# Patient Record
Sex: FEMALE | Race: WHITE | NOT HISPANIC OR LATINO | Employment: OTHER | URBAN - METROPOLITAN AREA
[De-identification: names, ages, dates, MRNs, and addresses within clinical notes are randomized per-mention and may not be internally consistent; named-entity substitution may affect disease eponyms.]

---

## 2024-07-22 ENCOUNTER — OUTSIDE PLACE OF SERVICE (OUTPATIENT)
Dept: HEPATOLOGY | Facility: CLINIC | Age: 84
End: 2024-07-22
Payer: MEDICARE

## 2024-07-22 ENCOUNTER — OUTSIDE PLACE OF SERVICE (OUTPATIENT)
Facility: HOSPITAL | Age: 84
End: 2024-07-22

## 2024-08-06 ENCOUNTER — HOSPITAL ENCOUNTER (INPATIENT)
Facility: HOSPITAL | Age: 84
LOS: 14 days | Discharge: NURSING, REHAB OR LONG-TERM CARE | DRG: 100 | End: 2024-08-20
Attending: INTERNAL MEDICINE | Admitting: INTERNAL MEDICINE
Payer: MEDICARE

## 2024-08-06 DIAGNOSIS — G93.41 ENCEPHALOPATHY, METABOLIC: Primary | ICD-10-CM

## 2024-08-06 DIAGNOSIS — R56.9 SEIZURE: ICD-10-CM

## 2024-08-06 DIAGNOSIS — S70.02XD HEMATOMA OF LEFT HIP, SUBSEQUENT ENCOUNTER: ICD-10-CM

## 2024-08-06 DIAGNOSIS — R56.9 SEIZURE-LIKE ACTIVITY: ICD-10-CM

## 2024-08-06 DIAGNOSIS — R07.9 CHEST PAIN: ICD-10-CM

## 2024-08-06 PROCEDURE — 63600175 PHARM REV CODE 636 W HCPCS: Performed by: HOSPITALIST

## 2024-08-06 PROCEDURE — 11000001 HC ACUTE MED/SURG PRIVATE ROOM

## 2024-08-06 PROCEDURE — 25000003 PHARM REV CODE 250: Performed by: HOSPITALIST

## 2024-08-06 RX ORDER — GLUCAGON 1 MG
1 KIT INJECTION
Status: DISCONTINUED | OUTPATIENT
Start: 2024-08-06 | End: 2024-08-20 | Stop reason: HOSPADM

## 2024-08-06 RX ORDER — LEVETIRACETAM 500 MG/5ML
750 INJECTION, SOLUTION, CONCENTRATE INTRAVENOUS EVERY 12 HOURS
Status: DISCONTINUED | OUTPATIENT
Start: 2024-08-06 | End: 2024-08-07

## 2024-08-06 RX ORDER — MUPIROCIN 20 MG/G
OINTMENT TOPICAL 2 TIMES DAILY
Status: DISPENSED | OUTPATIENT
Start: 2024-08-06 | End: 2024-08-10

## 2024-08-06 RX ORDER — TALC
6 POWDER (GRAM) TOPICAL NIGHTLY PRN
Status: DISCONTINUED | OUTPATIENT
Start: 2024-08-06 | End: 2024-08-20 | Stop reason: HOSPADM

## 2024-08-06 RX ORDER — ACETAMINOPHEN 325 MG/1
650 TABLET ORAL EVERY 4 HOURS PRN
Status: DISCONTINUED | OUTPATIENT
Start: 2024-08-06 | End: 2024-08-20 | Stop reason: HOSPADM

## 2024-08-06 RX ORDER — NALOXONE HCL 0.4 MG/ML
0.02 VIAL (ML) INJECTION
Status: DISCONTINUED | OUTPATIENT
Start: 2024-08-06 | End: 2024-08-20 | Stop reason: HOSPADM

## 2024-08-06 RX ORDER — HYDRALAZINE HYDROCHLORIDE 20 MG/ML
5 INJECTION INTRAMUSCULAR; INTRAVENOUS EVERY 6 HOURS PRN
Status: DISCONTINUED | OUTPATIENT
Start: 2024-08-06 | End: 2024-08-17

## 2024-08-06 RX ORDER — LEVOFLOXACIN 5 MG/ML
750 INJECTION, SOLUTION INTRAVENOUS
Status: DISCONTINUED | OUTPATIENT
Start: 2024-08-08 | End: 2024-08-07

## 2024-08-06 RX ORDER — DEXTROSE MONOHYDRATE AND SODIUM CHLORIDE 5; .9 G/100ML; G/100ML
INJECTION, SOLUTION INTRAVENOUS CONTINUOUS
Status: DISCONTINUED | OUTPATIENT
Start: 2024-08-06 | End: 2024-08-07

## 2024-08-06 RX ORDER — MORPHINE SULFATE 2 MG/ML
2 INJECTION, SOLUTION INTRAMUSCULAR; INTRAVENOUS EVERY 4 HOURS PRN
Status: DISCONTINUED | OUTPATIENT
Start: 2024-08-06 | End: 2024-08-07

## 2024-08-06 RX ORDER — ALUMINUM HYDROXIDE, MAGNESIUM HYDROXIDE, AND SIMETHICONE 1200; 120; 1200 MG/30ML; MG/30ML; MG/30ML
30 SUSPENSION ORAL 4 TIMES DAILY PRN
Status: DISCONTINUED | OUTPATIENT
Start: 2024-08-06 | End: 2024-08-20 | Stop reason: HOSPADM

## 2024-08-06 RX ORDER — CALCIUM CARBONATE 200(500)MG
500 TABLET,CHEWABLE ORAL 2 TIMES DAILY PRN
Status: DISCONTINUED | OUTPATIENT
Start: 2024-08-06 | End: 2024-08-20 | Stop reason: HOSPADM

## 2024-08-06 RX ORDER — ONDANSETRON HYDROCHLORIDE 2 MG/ML
4 INJECTION, SOLUTION INTRAVENOUS EVERY 8 HOURS PRN
Status: DISCONTINUED | OUTPATIENT
Start: 2024-08-06 | End: 2024-08-20 | Stop reason: HOSPADM

## 2024-08-06 RX ORDER — PROCHLORPERAZINE EDISYLATE 5 MG/ML
5 INJECTION INTRAMUSCULAR; INTRAVENOUS EVERY 6 HOURS PRN
Status: DISCONTINUED | OUTPATIENT
Start: 2024-08-06 | End: 2024-08-20 | Stop reason: HOSPADM

## 2024-08-06 RX ORDER — LORAZEPAM 2 MG/ML
2 INJECTION INTRAMUSCULAR EVERY 4 HOURS PRN
Status: DISCONTINUED | OUTPATIENT
Start: 2024-08-06 | End: 2024-08-20 | Stop reason: HOSPADM

## 2024-08-06 RX ORDER — HYDROCODONE BITARTRATE AND ACETAMINOPHEN 5; 325 MG/1; MG/1
1 TABLET ORAL EVERY 6 HOURS PRN
Status: DISCONTINUED | OUTPATIENT
Start: 2024-08-06 | End: 2024-08-20 | Stop reason: HOSPADM

## 2024-08-06 RX ORDER — POLYETHYLENE GLYCOL 3350 17 G/17G
17 POWDER, FOR SOLUTION ORAL DAILY
Status: DISCONTINUED | OUTPATIENT
Start: 2024-08-07 | End: 2024-08-07 | Stop reason: SDUPTHER

## 2024-08-06 RX ORDER — IPRATROPIUM BROMIDE AND ALBUTEROL SULFATE 2.5; .5 MG/3ML; MG/3ML
3 SOLUTION RESPIRATORY (INHALATION) EVERY 4 HOURS PRN
Status: DISCONTINUED | OUTPATIENT
Start: 2024-08-06 | End: 2024-08-20 | Stop reason: HOSPADM

## 2024-08-06 RX ADMIN — LEVETIRACETAM 750 MG: 100 INJECTION INTRAVENOUS at 08:08

## 2024-08-06 RX ADMIN — DEXTROSE AND SODIUM CHLORIDE: 5; 900 INJECTION, SOLUTION INTRAVENOUS at 06:08

## 2024-08-06 RX ADMIN — MUPIROCIN: 20 OINTMENT TOPICAL at 08:08

## 2024-08-06 NOTE — NURSING
Nurses Note -- 4 Eyes      8/6/2024   5:59 PM      Skin assessed during: Admit      [x] No Altered Skin Integrity Present    [x]Prevention Measures Documented      [] Yes- Altered Skin Integrity Present or Discovered   [] LDA Added if Not in Epic (Describe Wound)   [] New Altered Skin Integrity was Present on Admit and Documented in LDA   [] Wound Image Taken    Wound Care Consulted? No    Attending Nurse:  Sinan VANEGAS    Second RN/Staff Member:   Corky VANEGAS

## 2024-08-06 NOTE — NURSING
Received the pt from [facility] ED to room 952 at 17:30 pm. Vitals took, (/70, HR76, spo2 95%, T97.7, RR17).  MD Wagner Joseph notified for admission. 4 eyes done. Mullins present on admission. Safety precaution in place.

## 2024-08-07 LAB
ALBUMIN SERPL BCP-MCNC: 2.4 G/DL (ref 3.5–5.2)
ALP SERPL-CCNC: 108 U/L (ref 55–135)
ALT SERPL W/O P-5'-P-CCNC: 8 U/L (ref 10–44)
ANION GAP SERPL CALC-SCNC: 7 MMOL/L (ref 8–16)
AST SERPL-CCNC: 21 U/L (ref 10–40)
BASOPHILS # BLD AUTO: 0.05 K/UL (ref 0–0.2)
BASOPHILS NFR BLD: 0.6 % (ref 0–1.9)
BILIRUB SERPL-MCNC: 0.8 MG/DL (ref 0.1–1)
BUN SERPL-MCNC: 15 MG/DL (ref 8–23)
CALCIUM SERPL-MCNC: 8.9 MG/DL (ref 8.7–10.5)
CHLORIDE SERPL-SCNC: 106 MMOL/L (ref 95–110)
CO2 SERPL-SCNC: 23 MMOL/L (ref 23–29)
CREAT SERPL-MCNC: 1.4 MG/DL (ref 0.5–1.4)
DIFFERENTIAL METHOD BLD: ABNORMAL
EOSINOPHIL # BLD AUTO: 0.2 K/UL (ref 0–0.5)
EOSINOPHIL NFR BLD: 2.7 % (ref 0–8)
ERYTHROCYTE [DISTWIDTH] IN BLOOD BY AUTOMATED COUNT: 22.3 % (ref 11.5–14.5)
EST. GFR  (NO RACE VARIABLE): 37.3 ML/MIN/1.73 M^2
GLUCOSE SERPL-MCNC: 104 MG/DL (ref 70–110)
HCT VFR BLD AUTO: 32.4 % (ref 37–48.5)
HGB BLD-MCNC: 10.4 G/DL (ref 12–16)
IMM GRANULOCYTES # BLD AUTO: 0.06 K/UL (ref 0–0.04)
IMM GRANULOCYTES NFR BLD AUTO: 0.7 % (ref 0–0.5)
INR PPP: 1.4 (ref 0.8–1.2)
LYMPHOCYTES # BLD AUTO: 0.9 K/UL (ref 1–4.8)
LYMPHOCYTES NFR BLD: 10.7 % (ref 18–48)
MAGNESIUM SERPL-MCNC: 1.7 MG/DL (ref 1.6–2.6)
MCH RBC QN AUTO: 31.9 PG (ref 27–31)
MCHC RBC AUTO-ENTMCNC: 32.1 G/DL (ref 32–36)
MCV RBC AUTO: 99 FL (ref 82–98)
MONOCYTES # BLD AUTO: 0.9 K/UL (ref 0.3–1)
MONOCYTES NFR BLD: 10.7 % (ref 4–15)
NEUTROPHILS # BLD AUTO: 6.4 K/UL (ref 1.8–7.7)
NEUTROPHILS NFR BLD: 74.6 % (ref 38–73)
NRBC BLD-RTO: 0 /100 WBC
PHOSPHATE SERPL-MCNC: 2 MG/DL (ref 2.7–4.5)
PLATELET # BLD AUTO: 272 K/UL (ref 150–450)
PMV BLD AUTO: 9.8 FL (ref 9.2–12.9)
POTASSIUM SERPL-SCNC: 3.2 MMOL/L (ref 3.5–5.1)
PROT SERPL-MCNC: 9.2 G/DL (ref 6–8.4)
PROTHROMBIN TIME: 15.4 SEC (ref 9–12.5)
RBC # BLD AUTO: 3.26 M/UL (ref 4–5.4)
SODIUM SERPL-SCNC: 136 MMOL/L (ref 136–145)
T4 FREE SERPL-MCNC: 0.66 NG/DL (ref 0.71–1.51)
TSH SERPL DL<=0.005 MIU/L-ACNC: 19.41 UIU/ML (ref 0.4–4)
WBC # BLD AUTO: 8.58 K/UL (ref 3.9–12.7)

## 2024-08-07 PROCEDURE — 87040 BLOOD CULTURE FOR BACTERIA: CPT | Performed by: NURSE PRACTITIONER

## 2024-08-07 PROCEDURE — 99232 SBSQ HOSP IP/OBS MODERATE 35: CPT | Mod: GC,,, | Performed by: PSYCHIATRY & NEUROLOGY

## 2024-08-07 PROCEDURE — 95813 EEG EXTND MNTR 61-119 MIN: CPT | Mod: 26,,, | Performed by: PSYCHIATRY & NEUROLOGY

## 2024-08-07 PROCEDURE — 95813 EEG EXTND MNTR 61-119 MIN: CPT

## 2024-08-07 PROCEDURE — 25000003 PHARM REV CODE 250: Performed by: HOSPITALIST

## 2024-08-07 PROCEDURE — 97165 OT EVAL LOW COMPLEX 30 MIN: CPT

## 2024-08-07 PROCEDURE — 63600175 PHARM REV CODE 636 W HCPCS: Performed by: HOSPITALIST

## 2024-08-07 PROCEDURE — 36415 COLL VENOUS BLD VENIPUNCTURE: CPT | Performed by: NURSE PRACTITIONER

## 2024-08-07 PROCEDURE — 84439 ASSAY OF FREE THYROXINE: CPT | Performed by: HOSPITALIST

## 2024-08-07 PROCEDURE — 97530 THERAPEUTIC ACTIVITIES: CPT

## 2024-08-07 PROCEDURE — 97535 SELF CARE MNGMENT TRAINING: CPT

## 2024-08-07 PROCEDURE — 92610 EVALUATE SWALLOWING FUNCTION: CPT

## 2024-08-07 PROCEDURE — 97162 PT EVAL MOD COMPLEX 30 MIN: CPT

## 2024-08-07 PROCEDURE — 84100 ASSAY OF PHOSPHORUS: CPT | Performed by: HOSPITALIST

## 2024-08-07 PROCEDURE — 85025 COMPLETE CBC W/AUTO DIFF WBC: CPT | Performed by: HOSPITALIST

## 2024-08-07 PROCEDURE — 83735 ASSAY OF MAGNESIUM: CPT | Performed by: HOSPITALIST

## 2024-08-07 PROCEDURE — 80053 COMPREHEN METABOLIC PANEL: CPT | Performed by: HOSPITALIST

## 2024-08-07 PROCEDURE — 36415 COLL VENOUS BLD VENIPUNCTURE: CPT | Performed by: HOSPITALIST

## 2024-08-07 PROCEDURE — 11000001 HC ACUTE MED/SURG PRIVATE ROOM

## 2024-08-07 PROCEDURE — 97110 THERAPEUTIC EXERCISES: CPT

## 2024-08-07 PROCEDURE — 85610 PROTHROMBIN TIME: CPT | Performed by: HOSPITALIST

## 2024-08-07 PROCEDURE — 84443 ASSAY THYROID STIM HORMONE: CPT | Performed by: HOSPITALIST

## 2024-08-07 RX ORDER — METOPROLOL SUCCINATE 50 MG/1
50 TABLET, EXTENDED RELEASE ORAL DAILY
Status: DISCONTINUED | OUTPATIENT
Start: 2024-08-07 | End: 2024-08-09

## 2024-08-07 RX ORDER — POLYETHYLENE GLYCOL 3350 17 G/17G
17 POWDER, FOR SOLUTION ORAL DAILY
Status: DISCONTINUED | OUTPATIENT
Start: 2024-08-07 | End: 2024-08-20 | Stop reason: HOSPADM

## 2024-08-07 RX ORDER — IPRATROPIUM BROMIDE AND ALBUTEROL SULFATE 2.5; .5 MG/3ML; MG/3ML
3 SOLUTION RESPIRATORY (INHALATION)
Status: DISCONTINUED | OUTPATIENT
Start: 2024-08-07 | End: 2024-08-07 | Stop reason: SDUPTHER

## 2024-08-07 RX ORDER — MONTELUKAST SODIUM 10 MG/1
10 TABLET ORAL NIGHTLY
Status: DISCONTINUED | OUTPATIENT
Start: 2024-08-07 | End: 2024-08-20 | Stop reason: HOSPADM

## 2024-08-07 RX ORDER — ATORVASTATIN CALCIUM 40 MG/1
40 TABLET, FILM COATED ORAL NIGHTLY
Status: DISCONTINUED | OUTPATIENT
Start: 2024-08-07 | End: 2024-08-20 | Stop reason: HOSPADM

## 2024-08-07 RX ORDER — MAGNESIUM SULFATE HEPTAHYDRATE 40 MG/ML
2 INJECTION, SOLUTION INTRAVENOUS ONCE
Status: COMPLETED | OUTPATIENT
Start: 2024-08-07 | End: 2024-08-07

## 2024-08-07 RX ORDER — LEVOTHYROXINE SODIUM 88 UG/1
88 TABLET ORAL
Status: DISCONTINUED | OUTPATIENT
Start: 2024-08-07 | End: 2024-08-09

## 2024-08-07 RX ORDER — NITROFURANTOIN 25; 75 MG/1; MG/1
100 CAPSULE ORAL EVERY 12 HOURS
Status: DISCONTINUED | OUTPATIENT
Start: 2024-08-07 | End: 2024-08-09

## 2024-08-07 RX ORDER — MEMANTINE HYDROCHLORIDE 5 MG/1
5 TABLET ORAL 2 TIMES DAILY
Status: DISCONTINUED | OUTPATIENT
Start: 2024-08-07 | End: 2024-08-20 | Stop reason: HOSPADM

## 2024-08-07 RX ORDER — LEVETIRACETAM 500 MG/1
500 TABLET ORAL 2 TIMES DAILY
Status: DISCONTINUED | OUTPATIENT
Start: 2024-08-07 | End: 2024-08-08

## 2024-08-07 RX ORDER — LEVETIRACETAM 500 MG/5ML
500 INJECTION, SOLUTION, CONCENTRATE INTRAVENOUS EVERY 12 HOURS
Status: DISCONTINUED | OUTPATIENT
Start: 2024-08-07 | End: 2024-08-07

## 2024-08-07 RX ORDER — DOCUSATE SODIUM 100 MG/1
100 CAPSULE, LIQUID FILLED ORAL 2 TIMES DAILY
Status: DISCONTINUED | OUTPATIENT
Start: 2024-08-07 | End: 2024-08-20 | Stop reason: HOSPADM

## 2024-08-07 RX ORDER — LANOLIN ALCOHOL/MO/W.PET/CERES
1 CREAM (GRAM) TOPICAL DAILY
Status: DISCONTINUED | OUTPATIENT
Start: 2024-08-08 | End: 2024-08-20 | Stop reason: HOSPADM

## 2024-08-07 RX ADMIN — MEMANTINE 5 MG: 5 TABLET ORAL at 08:08

## 2024-08-07 RX ADMIN — MUPIROCIN: 20 OINTMENT TOPICAL at 09:08

## 2024-08-07 RX ADMIN — LEVOTHYROXINE SODIUM 88 MCG: 88 TABLET ORAL at 09:08

## 2024-08-07 RX ADMIN — LEVETIRACETAM 750 MG: 100 INJECTION INTRAVENOUS at 09:08

## 2024-08-07 RX ADMIN — POLYETHYLENE GLYCOL 3350 17 G: 17 POWDER, FOR SOLUTION ORAL at 05:08

## 2024-08-07 RX ADMIN — DOCUSATE SODIUM 50 MG: 50 CAPSULE, LIQUID FILLED ORAL at 09:08

## 2024-08-07 RX ADMIN — LEVETIRACETAM 500 MG: 500 TABLET, FILM COATED ORAL at 08:08

## 2024-08-07 RX ADMIN — NITROFURANTOIN MONOHYDRATE/MACROCRYSTALS 100 MG: 25; 75 CAPSULE ORAL at 05:08

## 2024-08-07 RX ADMIN — MAGNESIUM SULFATE HEPTAHYDRATE 2 G: 40 INJECTION, SOLUTION INTRAVENOUS at 05:08

## 2024-08-07 RX ADMIN — Medication 6 MG: at 08:08

## 2024-08-07 RX ADMIN — METOPROLOL SUCCINATE 50 MG: 50 TABLET, EXTENDED RELEASE ORAL at 05:08

## 2024-08-07 RX ADMIN — ATORVASTATIN CALCIUM 40 MG: 40 TABLET, FILM COATED ORAL at 08:08

## 2024-08-07 RX ADMIN — POLYETHYLENE GLYCOL 3350 17 G: 17 POWDER, FOR SOLUTION ORAL at 09:08

## 2024-08-07 RX ADMIN — HYDRALAZINE HYDROCHLORIDE 5 MG: 20 INJECTION, SOLUTION INTRAMUSCULAR; INTRAVENOUS at 03:08

## 2024-08-07 RX ADMIN — POTASSIUM PHOSPHATE, MONOBASIC AND POTASSIUM PHOSPHATE, DIBASIC 20 MMOL: 224; 236 INJECTION, SOLUTION, CONCENTRATE INTRAVENOUS at 01:08

## 2024-08-07 RX ADMIN — MONTELUKAST 10 MG: 10 TABLET, FILM COATED ORAL at 08:08

## 2024-08-07 RX ADMIN — MUPIROCIN: 20 OINTMENT TOPICAL at 08:08

## 2024-08-07 RX ADMIN — DOCUSATE SODIUM 100 MG: 100 CAPSULE, LIQUID FILLED ORAL at 08:08

## 2024-08-07 NOTE — PLAN OF CARE
Problem: Occupational Therapy  Goal: Occupational Therapy Goal  Description: Goals to be met by: 09/07/2024     Patient will increase functional independence with ADLs by performing:    LE Dressing with Minimal Assistance  to lucie socks  Grooming while standing at sink with Contact Guard Assistance.  Toileting from toilet with Contact Guard Assistance for hygiene and clothing management.   Step transfer with Contact Guard Assistance  Toilet transfer to toilet with Contact Guard Assistance.    Outcome: Progressing

## 2024-08-07 NOTE — ASSESSMENT & PLAN NOTE
Neuro checks   Fall precautions   Seizure precautions  Memantine   Follow up with pharmacy consultation and family input for home medication list

## 2024-08-07 NOTE — ASSESSMENT & PLAN NOTE
-appears to be Enterococcus, unclear if truly infectious given very low colony count  -switching from Levaquin over to Macrobid

## 2024-08-07 NOTE — PLAN OF CARE
POC established and functional mobility goals were created to help pt return to PLOF. Will be reassessed as appropriate to measure pt progress.    Problem: Physical Therapy  Goal: Physical Therapy Goal  Description: Goals to be met by: 24     Patient will increase functional independence with mobility by performin. Supine to sit with MInimal Assistance  2. Sit to supine with MInimal Assistance  3. Sit to stand transfer with Minimal Assistance  4. Bed to chair transfer with Minimal Assistance using LRAD as needed  5. Gait  x 150 feet with Minimal Assistance using LRAD as needed.   6. Ascend/descend 3 stair with no Handrails and Minimal Assistance  7. Lower extremity exercise program x15 reps per handout, with assistance as needed    Outcome: Progressing

## 2024-08-07 NOTE — PROGRESS NOTES
[facility]argas - Neurosurgery ([facility])  [facility]  Progress Note    Patient Name: Shaye Clemente  MRN: [id number]  Patient Class: IP- Inpatient   Admission Date: 8/6/2024  Length of Stay: 1 days  Attending Physician: Jame Tiwari MD  Primary Care Provider: Skip Singh MD        Subjective:     Principal Problem:Seizure-like activity        HPI:  84-year-old female with a left hip hemiarthroplasty in June (by report had hemiarthroplasty of closed comminuted femoral neck hip fracture), paroxysmal atrial fibrillation (on Xarelto), hypothyroidism, dementia, and hyperlipidemia admitted to [facility] on August 4 with low hemoglobin.  Patient reported tarry stools for several days, but Hemoccult was negative in the emergency department.  She also noted discomfort and swelling around her left hip.  Labs in the emergency department were concerning for anemia with hemoglobin 6.5.  There was also concern for possible cystitis, and she was placed on ciprofloxacin.  CT of the head had no acute abnormalities, and CT of the left hip showed soft tissue fullness and fluid in the deep left pelvis and presacral region.  Initial EKG showed sinus rhythm.  She was transfused 2 units packed red blood cells and admitted to Hospital Medicine.  With transfusion, hemoglobin increased to 9.3.  She was confused during her stay.  She was taken for CT of the abdomen and pelvis on the morning of August 5 when she began to have seizure activity.  She had fluttering of her eyes and movement of her head along with dorsiflexion of both feet.  She was given Keppra and placed on twice daily Keppra.  MRI brain had no acute intracranial abnormality.     She underwent Neurology tele-consultation, and recommendations included additional Keppra along with Q 4 hour neuro checks and routine EEG.  She was lethargic and required repeated stimulation for her to participate during examination.  She was oriented to person and time.  She was  able to follow simple commands.  Mentation appears to be slowly improving.      She underwent noncontrasted CT of the abdomen and pelvis that showed a subcutaneous fluid collection overlying the left hip which may represent resolving hematoma or postsurgical seroma.  There were also findings suspicious for intramuscular hematoma in the left iliopsoas, psoas, and iliacus.  Transfer center spoke with Orthopedic Surgery at [facility].  At present, there does not appear to be surgical intervention indicated in the left hip.  Case also discussed with Interventional Radiology at [facility].  Monitor the trend in hemoglobin and hemodynamic status.  If she remains stable, continue monitoring.  If she has a significant drop in hemoglobin or show signs of hemodynamic instability, she may need CTA of the abdomen and pelvis (bleed protocol) to determine if IR intervention is indicated. Pt. Transferred toHospital Medicine at [facility] in step-down status for EEG and Neurology evaluation of new onset seizures as well as monitoring the trend in her hemoglobin.     August 5:  Sodium 135, potassium 3.2, chloride 104, CO2 23, BUN 12, creatinine 1.7, glucose 84, calcium 8.4, magnesium 1.4, phosphorus 3.2, AST 20, ALT, white blood cells 7.13, hemoglobin 9.3, hematocrit 27.9, platelets 242  -repeat CBC on the afternoon of August 5 had hemoglobin 10.7 and hematocrit 32.7 (improved from earlier).  -CT abdomen and pelvis without contrast noted small hypoattenuating nodule in the right hepatic lobe may represent a cyst.  Diverticulosis.  Recent right total left hip arthroplasty with poorly defined subcutaneous fluid collection overlying the left hip which may represent resolving hematoma or postsurgical seroma.  Postsurgical abscess not excludable.  Findings suspicious for intramuscular hematoma of the left iliopsoas, psoas, and iliacus muscles with potential active hemorrhage.  -MRI brain was motion limited.  No acute  intracranial abnormality.  Cortical atrophy with periventricular deep white matter change consistent with chronic small-vessel ischemic disease.     : Blood cultures with no growth to date, INR 1.8, iron saturation 19%, stool for occult blood negative, lactic acid 0.7, hemoglobin 6.7, hematocrit 20.5, procalcitonin 0.26  -urinalysis with 2+ protein, trace blood, trace leukocytes, 4 RBC, 8 WBC, many bacteria  -chest x-ray had no acute cardiopulmonary process identified  -CT head had no hemorrhage or other acute intracranial CT findings.  -CT left hip showed nonspecific soft tissue fullness to lateral hip measuring 9.4 x 8.7 x 4.8 cm.  Partially imaged slightly hyperattenuating fluid in the left deep pelvis and presacral region which may represent blood products.  Postsurgical changes of left hip arthroplasty.  -EKG showed normal sinus rhythm and appeared to be fairly normal.    Overview/Hospital Course:  Patient maintained stable hemoglobin since admission to [facility].  Working well with speech therapy, tolerating bite size soft diet.  TSH noted to be markedly elevated up to 19, started on Synthroid    Interval History:  Patient maintained stable hemoglobin since admission to [facility].  Working well with speech therapy, tolerating bite size soft diet.  TSH noted to be markedly elevated up to 19, started on Synthroid. Pressures noted to be high to 180s systolic.  When I asked the patient who she lives with, says her -but daughter affirms that the patient lives by herself,  is .  Per discussion with the daughter patient lives by herself but the daughters and lives next door and is supposed to be assisting with management of all medication.     Review of Systems  Objective:     Vital Signs (Most Recent):  Temp: 97.3 °F (36.3 °C) (24 1533)  Pulse: 91 (24 1646)  Resp: 18 (24 1646)  BP: (!) 155/79 (24 1646)  SpO2: (!) 94 % (24 1533) Vital Signs (24h  Range):  Temp:  [97.3 °F (36.3 °C)-98.7 °F (37.1 °C)] 97.3 °F (36.3 °C)  Pulse:  [76-91] 91  Resp:  [18] 18  SpO2:  [92 %-96 %] 94 %  BP: (145-184)/(70-98) 155/79     Weight: 83.5 kg (184 lb 1.4 oz)  Body mass index is 28.83 kg/m².    Intake/Output Summary (Last 24 hours) at 8/7/2024 1648  Last data filed at 8/6/2024 2148  Gross per 24 hour   Intake --   Output 250 ml   Net -250 ml         Physical Exam      Awake alert, no acute distress   No facial droop, no slurred speech   Clear lungs bilaterally, unlabored breathing, on room air, no cyanosis   Heart sounds indicate a regular rate and rhythm   Awake alert, no acute distress   Minimally edematous bilateral lower extremities   5/5 proximal upper and lower extremity strength bilaterally including fist  and hip flexor strength   Intact straight leg raise with some pain elicited at the 45 degree marked   No obvious bruising noted on abdomen or left thigh  Normocephalic, atraumatic   Oriented to self and place      Assessment/Plan:      * Seizure-like activity  -Reported seizure at [facility] prior to transfer pt. Was post-ictal and remains confused  -MRI brain uremarkable  -EEG so far unremarkable for any seizure activity   -currently on Keppra 500, follow up Neurology recommendations   -seizure precautions, fall precautions   -neuro checks      Encephalopathy, metabolic  -Pt. With intermittent confusion, seizure activity. Mentation seems to be improving with time after seizure   -do not suspect true UTI as cause given very low colony count  -MRI brain negative for acute findings.   -TSH markedly elevated, see below for further treatment, could have been easily contributing factor  -Delirium precautions ordered    Hip hematoma, left  -Pt. Presented anemia, required 1 unit pRBC, Hgb now stable ~10. CT abdomen conerning for subcutaneous fluid collection overlying the left hip may represent resolving hematoma or postsurgical seroma andFindings suspicious for  "an intramuscular hematoma of the left iliopsoas, psoas and iliacus muscles with potential active hemorrhage.  -Case discussed with IR and ortho prior to transfer, plan "Monitor the trend in hemoglobin and hemodynamic status. If she remains stable, continue monitoring. If she has a significant drop in hemoglobin or show signs of hemodynamic instability, she may need CTA of the abdomen and pelvis (bleed protocol) to determine if IR intervention is indicated"          Acute cystitis with hematuria  -appears to be Enterococcus, unclear if truly infectious given very low colony count  -switching from Levaquin over to Macrobid      Hypertension  Pressures noted to be in the 180s systolic   Continue home Toprol   Follow up with pharmacy and family regarding home medication list    Paroxysmal atrial fibrillation  -Hx of pAfib. EKG on admit showed NSR, rhythm currently regular  -Continue home metoprolol and monitor on telemetry.  Holding Xarelto due to bleeding concerns-this was affirmed by the daughter via phone    Dementia  Neuro checks   Fall precautions   Seizure precautions  Memantine   Follow up with pharmacy consultation and family input for home medication list    Hypothyroidism  Starting Synthroid 88 mcg   Following up with a pharmacy consult for home medication reconciliation      LUIS CARLOS (acute kidney injury)  -unclear if baseline, creatinine initially elevated at 2.0 now improving down to 1.4,   -stopping IV fluids      VTE Risk Mitigation (From admission, onward)           Ordered     Place sequential compression device  Until discontinued         08/06/24 1811                    Discharge Planning   KATI: 8/9/2024     Code Status: DNR   Is the patient medically ready for discharge?:     Reason for patient still in hospital (select all that apply): Patient trending condition, Treatment, and Consult recommendations  Discharge Plan A: Skilled Nursing Facility                  Jame Tiwari MD  Department of " [facility] Medicine   [facility] Vargas - Neurosurgery ([facility])

## 2024-08-07 NOTE — SUBJECTIVE & OBJECTIVE
Interval History:  Patient maintained stable hemoglobin since admission to [facility].  Working well with speech therapy, tolerating bite size soft diet.  TSH noted to be markedly elevated up to 19, started on Synthroid. Pressures noted to be high to 180s systolic.  When I asked the patient who she lives with, says her -but daughter affirms that the patient lives by herself,  is .  Per discussion with the daughter patient lives by herself but the daughters and lives next door and is supposed to be assisting with management of all medication.     Review of Systems  Objective:     Vital Signs (Most Recent):  Temp: 97.3 °F (36.3 °C) (24 1533)  Pulse: 91 (24 164)  Resp: 18 (24)  BP: (!) 155/79 (24)  SpO2: (!) 94 % (24 1533) Vital Signs (24h Range):  Temp:  [97.3 °F (36.3 °C)-98.7 °F (37.1 °C)] 97.3 °F (36.3 °C)  Pulse:  [76-91] 91  Resp:  [18] 18  SpO2:  [92 %-96 %] 94 %  BP: (145-184)/(70-98) 155/79     Weight: 83.5 kg (184 lb 1.4 oz)  Body mass index is 28.83 kg/m².    Intake/Output Summary (Last 24 hours) at 2024 1648  Last data filed at 2024 2148  Gross per 24 hour   Intake --   Output 250 ml   Net -250 ml         Physical Exam      Awake alert, no acute distress   No facial droop, no slurred speech   Clear lungs bilaterally, unlabored breathing, on room air, no cyanosis   Heart sounds indicate a regular rate and rhythm   Awake alert, no acute distress   Minimally edematous bilateral lower extremities   5/5 proximal upper and lower extremity strength bilaterally including fist  and hip flexor strength   Intact straight leg raise with some pain elicited at the 45 degree marked   No obvious bruising noted on abdomen or left thigh  Normocephalic, atraumatic   Oriented to self and place

## 2024-08-07 NOTE — HPI
"Ms. Shaye Clemente is a 84 y.o. patient with history p-afib (Xarelto), hypothyroidism, dementia, HLD. Presented to [facility] 8/4 with acute mental status changes ~ 2 days that worsened the day prior to admission. At OSH with UTI on UA managed on ciprofloxacin and switched to levofloxacin pending cultures. CTH negative. While in the ED at OSH, she was planned to have CT A/P to rule out organic causes of UTI. Was found to have episodes of confusion and had a first-time seizure episode with eyes rolling back and tonic posture on bilateral lower extremities.     Loaded with 1.5g Keppra and continued on 750mg BID. Brain MRI following the event with cortical atrophy with some periventricular deep white matter suggesting chronic small-vessel ischemic disease. Tele-Neurology was consulted and patient was placed on routine EEG and a transfer was made to [facility] for higher level of neurological care and closed monitoring.     Neurology consulted for "AMS with concern for seizures"   "

## 2024-08-07 NOTE — ASSESSMENT & PLAN NOTE
Pressures noted to be in the 180s systolic   Continue home Toprol   Follow up with pharmacy and family regarding home medication list

## 2024-08-07 NOTE — ASSESSMENT & PLAN NOTE
-Cr 2.0 on presentation to [facility]. Unknown baseline, has downtrended to 1.6 since presentation. Continue to monitor closely while admitted

## 2024-08-07 NOTE — H&P
Yossi Vargas - Neurosurgery ([facility])  [facility]  History & Physical    Patient Name: Shaye Clemente  MRN: [id number]  Patient Class: IP- Inpatient  Admission Date: 8/6/2024  Attending Physician: Jame Tiwari MD   Primary Care Provider: Skip Singh MD         Patient information was obtained from patient, past medical records, and ER records.     Subjective:     Principal Problem:Acute encephalopathy    Chief Complaint: No chief complaint on file.       HPI: 84-year-old female with a left hip hemiarthroplasty in June (by report had hemiarthroplasty of closed comminuted femoral neck hip fracture), paroxysmal atrial fibrillation (on Xarelto), hypothyroidism, dementia, and hyperlipidemia admitted to [facility] on August 4 with low hemoglobin.  Patient reported tarry stools for several days, but Hemoccult was negative in the emergency department.  She also noted discomfort and swelling around her left hip.  Labs in the emergency department were concerning for anemia with hemoglobin 6.5.  There was also concern for possible cystitis, and she was placed on ciprofloxacin.  CT of the head had no acute abnormalities, and CT of the left hip showed soft tissue fullness and fluid in the deep left pelvis and presacral region.  Initial EKG showed sinus rhythm.  She was transfused 2 units packed red blood cells and admitted to Hospital Medicine.  With transfusion, hemoglobin increased to 9.3.  She was confused during her stay.  She was taken for CT of the abdomen and pelvis on the morning of August 5 when she began to have seizure activity.  She had fluttering of her eyes and movement of her head along with dorsiflexion of both feet.  She was given Keppra and placed on twice daily Keppra.  MRI brain had no acute intracranial abnormality.     She underwent Neurology tele-consultation, and recommendations included additional Keppra along with Q 4 hour neuro checks and routine EEG.  She was lethargic and  required repeated stimulation for her to participate during examination.  She was oriented to person and time.  She was able to follow simple commands.  Mentation appears to be slowly improving.      She underwent noncontrasted CT of the abdomen and pelvis that showed a subcutaneous fluid collection overlying the left hip which may represent resolving hematoma or postsurgical seroma.  There were also findings suspicious for intramuscular hematoma in the left iliopsoas, psoas, and iliacus.  Transfer center spoke with Orthopedic Surgery at [facility].  At present, there does not appear to be surgical intervention indicated in the left hip.  Case also discussed with Interventional Radiology at [facility].  Monitor the trend in hemoglobin and hemodynamic status.  If she remains stable, continue monitoring.  If she has a significant drop in hemoglobin or show signs of hemodynamic instability, she may need CTA of the abdomen and pelvis (bleed protocol) to determine if IR intervention is indicated. Pt. Transferred toHospital Medicine at [facility] in step-down status for EEG and Neurology evaluation of new onset seizures as well as monitoring the trend in her hemoglobin.     August 5:  Sodium 135, potassium 3.2, chloride 104, CO2 23, BUN 12, creatinine 1.7, glucose 84, calcium 8.4, magnesium 1.4, phosphorus 3.2, AST 20, ALT, white blood cells 7.13, hemoglobin 9.3, hematocrit 27.9, platelets 242  -repeat CBC on the afternoon of August 5 had hemoglobin 10.7 and hematocrit 32.7 (improved from earlier).  -CT abdomen and pelvis without contrast noted small hypoattenuating nodule in the right hepatic lobe may represent a cyst.  Diverticulosis.  Recent right total left hip arthroplasty with poorly defined subcutaneous fluid collection overlying the left hip which may represent resolving hematoma or postsurgical seroma.  Postsurgical abscess not excludable.  Findings suspicious for intramuscular hematoma of the  left iliopsoas, psoas, and iliacus muscles with potential active hemorrhage.  -MRI brain was motion limited.  No acute intracranial abnormality.  Cortical atrophy with periventricular deep white matter change consistent with chronic small-vessel ischemic disease.     August 4: Blood cultures with no growth to date, INR 1.8, iron saturation 19%, stool for occult blood negative, lactic acid 0.7, hemoglobin 6.7, hematocrit 20.5, procalcitonin 0.26  -urinalysis with 2+ protein, trace blood, trace leukocytes, 4 RBC, 8 WBC, many bacteria  -chest x-ray had no acute cardiopulmonary process identified  -CT head had no hemorrhage or other acute intracranial CT findings.  -CT left hip showed nonspecific soft tissue fullness to lateral hip measuring 9.4 x 8.7 x 4.8 cm.  Partially imaged slightly hyperattenuating fluid in the left deep pelvis and presacral region which may represent blood products.  Postsurgical changes of left hip arthroplasty.  -EKG showed normal sinus rhythm and appeared to be fairly normal.    Past Medical History:   Diagnosis Date    Mumps without complication     Paroxysmal atrial fibrillation        Past Surgical History:   Procedure Laterality Date    BREAST SURGERY Right     partial    HEMIARTHROPLASTY, HIP, POSTERIOR APPROACH Left 06/04/2024    HYSTERECTOMY      TONSILLECTOMY      VAGINECTOMY         Review of patient's allergies indicates:   Allergen Reactions    Prilosec [omeprazole]     Augmentin [amoxicillin-pot clavulanate] Nausea And Vomiting    Codeine Rash    Sulfa (sulfonamide antibiotics) Rash       Current Facility-Administered Medications on File Prior to Encounter   Medication    [DISCONTINUED] 0.9%  NaCl infusion (for blood administration)    [DISCONTINUED] 0.9%  NaCl infusion (for blood administration)    [DISCONTINUED] acetaminophen tablet 650 mg    [DISCONTINUED] albuterol-ipratropium 2.5 mg-0.5 mg/3 mL nebulizer solution 3 mL    [DISCONTINUED] aluminum-magnesium hydroxide-simethicone  200-200-20 mg/5 mL suspension 30 mL    [DISCONTINUED] calcium carbonate 200 mg calcium (500 mg) chewable tablet 500 mg    [DISCONTINUED] dextrose 5 % and 0.9 % NaCl infusion    [DISCONTINUED] glucagon (human recombinant) injection 1 mg    [DISCONTINUED] hydrALAZINE injection 5 mg    [DISCONTINUED] HYDROcodone-acetaminophen 5-325 mg per tablet 1 tablet    [DISCONTINUED] levETIRAcetam injection 750 mg    [DISCONTINUED] levoFLOXacin 750 mg/150 mL IVPB 750 mg    [DISCONTINUED] LORazepam (ATIVAN) injection 2 mg    [DISCONTINUED] melatonin tablet 6 mg    [DISCONTINUED] mometasone 0.1% cream    [DISCONTINUED] morphine injection 2 mg    [DISCONTINUED] mupirocin 2 % ointment    [DISCONTINUED] naloxone 0.4 mg/mL injection 0.02 mg    [DISCONTINUED] ondansetron injection 4 mg    [DISCONTINUED] prochlorperazine injection Soln 5 mg     Current Outpatient Medications on File Prior to Encounter   Medication Sig    0.9% NaCl PgBk 100 mL with ertapenem 1 gram SolR 1 g Inject 1 g into the vein once daily.    albuterol-ipratropium (DUO-NEB) 2.5 mg-0.5 mg/3 mL nebulizer solution Take 3 mLs by nebulization every 3 (three) hours as needed for Wheezing. Rescue    amino acids-protein hydrolys (PRO-STAT AWC)  gram-kcal/30 mL Liqd Take 30 mLs by mouth 2 (two) times a day. For wound healing.    betamethasone valerate 0.1% (VALISONE) 0.1 % Crea Apply 0.1 % topically 2 (two) times daily. Apply to posterior trunk/back    CALCIUM CARBONATE ORAL Take 1 tablet by mouth 2 (two) times daily as needed for Heartburn.    diphenhydrAMINE (BENADRYL) 25 mg capsule Take 25 mg by mouth every 6 (six) hours as needed for Itching.    docusate sodium (COLACE) 100 MG capsule Take 100 mg by mouth 2 (two) times daily.    DULOXETINE HCL, BULK, MISC by Misc.(Non-Drug; Combo Route) route.    ferrous sulfate (FEOSOL) 325 mg (65 mg iron) Tab tablet Take 325 mg by mouth daily with breakfast.    HYDROcodone-acetaminophen (NORCO) 5-325 mg per tablet Take 1 tablet by  mouth every 6 (six) hours as needed for Pain.    ibandronate (BONIVA) 150 mg tablet Take 150 mg by mouth every 30 days.    levothyroxine (SYNTHROID) 50 MCG tablet Take 25 mcg by mouth before breakfast.    memantine (NAMENDA) 10 MG Tab Take 5 mg by mouth 2 (two) times daily.    metoprolol succinate (TOPROL-XL) 50 MG 24 hr tablet Take 50 mg by mouth once daily.    montelukast (SINGULAIR) 10 mg tablet Take 10 mg by mouth every evening.    polyethylene glycol (GLYCOLAX) 17 gram/dose powder Take 17 g by mouth once daily.    rivaroxaban (XARELTO) 20 mg Tab Take 20 mg by mouth daily with dinner or evening meal.    rosuvastatin (CRESTOR) 10 MG tablet Take 10 mg by mouth once daily.    Saccharomyces boulardii (FLORASTOR) 250 mg capsule Take 250 mg by mouth 2 (two) times daily.    tiotropium-olodateroL (STIOLTO RESPIMAT) 2.5-2.5 mcg/actuation Mist Inhale 2 puffs into the lungs Daily. Controller     Family History    None       Tobacco Use    Smoking status: Never    Smokeless tobacco: Never   Substance and Sexual Activity    Alcohol use: Not on file    Drug use: Not on file    Sexual activity: Not on file     Review of Systems   Constitutional:  Negative for activity change, appetite change, chills, fever and unexpected weight change.   HENT:  Negative for congestion and sore throat.    Respiratory:  Negative for cough and shortness of breath.    Cardiovascular:  Negative for chest pain, palpitations and leg swelling.   Gastrointestinal:  Negative for abdominal distention, abdominal pain, blood in stool, constipation, diarrhea, nausea and vomiting.   Genitourinary:  Negative for difficulty urinating, dysuria and hematuria.   Musculoskeletal:  Positive for arthralgias. Negative for myalgias.   Skin:  Negative for color change and rash.   Neurological:  Positive for seizures. Negative for dizziness and tremors.   Psychiatric/Behavioral:  Positive for confusion.      Objective:     Vital Signs (Most Recent):  Temp: 97.9 °F (36.6  °C) (08/06/24 1907)  Pulse: 77 (08/06/24 1953)  Resp: 18 (08/06/24 1907)  BP: (!) 179/77 (08/06/24 1953)  SpO2: 96 % (08/06/24 1907) Vital Signs (24h Range):  Temp:  [97.5 °F (36.4 °C)-97.9 °F (36.6 °C)] 97.9 °F (36.6 °C)  Pulse:  [61-77] 77  Resp:  [18-37] 18  SpO2:  [94 %-100 %] 96 %  BP: (146-193)/(62-77) 179/77     Weight: 83.5 kg (184 lb 1.4 oz)  Body mass index is 28.83 kg/m².     Physical Exam  Vitals reviewed.   Constitutional:       General: She is not in acute distress.     Appearance: She is well-developed.   HENT:      Head: Normocephalic and atraumatic.   Eyes:      Extraocular Movements: Extraocular movements intact.      Pupils: Pupils are equal, round, and reactive to light.   Neck:      Vascular: No JVD.      Trachea: No tracheal deviation.   Cardiovascular:      Rate and Rhythm: Normal rate and regular rhythm.      Heart sounds: No murmur heard.     No friction rub. No gallop.   Pulmonary:      Effort: No respiratory distress.      Breath sounds: Normal breath sounds. No wheezing or rales.   Abdominal:      General: Bowel sounds are normal. There is no distension.      Palpations: Abdomen is soft. There is no mass.      Tenderness: There is no abdominal tenderness.   Musculoskeletal:         General: No deformity.      Cervical back: Neck supple.   Lymphadenopathy:      Cervical: No cervical adenopathy.   Skin:     General: Skin is warm and dry.      Findings: No rash.   Neurological:      Mental Status: She is alert. She is disoriented.      Comments: Alert to person, place in hospital and year but not date/month/circumstances behind hospitalization, mildly confused              CRANIAL NERVES     CN III, IV, VI   Pupils are equal, round, and reactive to light.       Significant Labs: All pertinent labs within the past 24 hours have been reviewed.    Significant Imaging: I have reviewed all pertinent imaging results/findings within the past 24 hours.  Assessment/Plan:     * Acute  "encephalopathy  -Pt. With intermittent confusion, seizure activity. Mentation seems to be improving with time after seizure and treatment of UTI  -MRI brain negative for acute findings. Will order TSH for continued work-up. Other wise continue to treat UTI and work-up seizure disorder as below  -Delirium precautions ordered    Kidney injury  -Cr 2.0 on presentation to [facility]. Unknown baseline, has downtrended to 1.6 since presentation. Continue to monitor closely while admitted    Hip hematoma, left  -Pt. Presented anemia, required 1 unit pRBC, Hgb now stable ~10. CT abdomen conerning for subcutaneous fluid collection overlying the left hip may represent resolving hematoma or postsurgical seroma andFindings suspicious for an intramuscular hematoma of the left iliopsoas, psoas and iliacus muscles with potential active hemorrhage.  -Case discussed with IR and ortho prior to transfer, plan "Monitor the trend in hemoglobin and hemodynamic status. If she remains stable, continue monitoring. If she has a significant drop in hemoglobin or show signs of hemodynamic instability, she may need CTA of the abdomen and pelvis (bleed protocol) to determine if IR intervention is indicated"          Seizure  -Reported seizure at [facility] prior to transfer pt. Was post-ictal and remains confused  -Continous eeg EEG ordered, contine keppra 750 mg BID. Neurology consult for further assistance      Paroxysmal atrial fibrillation  -Hx of pAfib. EKG on admit showed NSR, rhythm currently regular  -Continue home metoprolol and monitor on telemetry. Hold xarelto due to bleeding conerns    Acute cystitis with hematuria  -U/A with only 8 WBC, but many bacteria. Continue IV levofloxacin started at OSH for treatment and f/u urine culture        VTE Risk Mitigation (From admission, onward)           Ordered     Place sequential compression device  Until discontinued         08/06/24 1811                                    Wagner " VIOLET Joseph MD  Department of Hospital Medicine  [facility] - Neurosurgery ([facility])

## 2024-08-07 NOTE — ASSESSMENT & PLAN NOTE
-Hx of pAfib. EKG on admit showed NSR, rhythm currently regular  -Continue home metoprolol and monitor on telemetry. Hold xarelto due to bleeding conerns

## 2024-08-07 NOTE — ASSESSMENT & PLAN NOTE
-Reported seizure at [facility] prior to transfer pt. Was post-ictal and remains confused  -Continous eeg EEG ordered, contine keppra 750 mg BID. Neurology consult for further assistance

## 2024-08-07 NOTE — PT/OT/SLP EVAL
Speech Language Pathology Evaluation  Bedside Swallow    Patient Name:  Shaye Clemente   MRN:  [id number]  Admitting Diagnosis: Encephalopathy, metabolic    Recommendations:                 General Recommendations:  Dysphagia therapy and Cognitive-linguistic evaluation  Diet recommendations:  Soft & Bite Sized Diet - IDDSI Level 6, Thin liquids - IDDSI Level 0   Aspiration Precautions: Strict 1:1 Assistance with meals, Avoid talking while eating, Eliminate distractions, Feed only when awake/alert, only when vital signs stable, Frequent oral care, HOB to 90 degrees, Meds whole 1 at a time, Monitor for s/s of aspiration, Remain upright 30 minutes post meal, and Strict aspiration precautions. Continue to monitor for signs and symptoms of aspiration and discontinue oral feeding should you notice any of the following: watery eyes, reddened facial area, wet vocal quality, increased work of breathing, change in respiratory status, increased congestion, coughing, fever and/or change in level of alertness  General Precautions: Standard, aspiration, fall, respiratory  Communication strategies:  provide increased time to answer and go to room if call light pushed    Assessment:     Shaye Clemente is a 84 y.o. female with an SLP diagnosis of  swallow function near baseline .  She presents with risk of aspiration due to decreased endurance, underlying cognitive status and underlying respiratory status.    History:     Past Medical History:   Diagnosis Date    Mumps without complication     Paroxysmal atrial fibrillation        Past Surgical History:   Procedure Laterality Date    BREAST SURGERY Right     partial    HEMIARTHROPLASTY, HIP, POSTERIOR APPROACH Left 06/04/2024    HYSTERECTOMY      TONSILLECTOMY      VAGINECTOMY       Social/occupational History: Patient lives with alone, she explained her sister lived next door and handled her medications for her.    Prior Intubation HX:  none this admission    Modified Barium Swallow: none  "prior at this facility     Chest X-Rays: 8/4/24: No acute cardiopulmonary process identified.     Prior diet: regular textures, thin liquids.    Subjective     SLP reviewed Pt with RN, RN cleared for tx, explained Pt tolerated medications whole without difficulty  Pt presents alert  She explains, "I have asthma and COPD"     Pain/Comfort:  Pain Rating 1: other (see comments) (did not rate)  Location - Side 1: Left  Location - Orientation 1: generalized  Location 1: hip  Pain Addressed 1: Nurse notified  Pain Rating Post-Intervention 1: other (see comments) (did not rate)    Respiratory Status: Room air    Objective:     Oral Musculature Evaluation  Oral Musculature: general weakness  Dentition: present and adequate, upper dentures  Secretion Management: adequate  Mucosal Quality: adequate  Mandibular Strength and Mobility: impaired  Oral Labial Strength and Mobility: impaired retraction  Lingual Strength and Mobility: functional anterior elevation, functional lateral movement  Volitional Cough: present  Volitional Swallow: elicited  Voice Prior to PO Intake: clear, low intensity    Bedside Swallow Eval:   Consistencies Assessed:  Thin liquids : cup sips single x5, cup sips continuous x1  Puree tsp bites x8  Solids bites of saltine cracker x3      Oral Phase:   WNL    Pharyngeal Phase:   no overt clinical signs/symptoms of aspiration    Compensatory Strategies  Pt required occasional cues to refrain from talking while PO trials in oral cavity    Treatment: Pt found awake in bed with EEG in place. She was alert and oriented x3 to person,place and situation. She demonstrated decreased general recall of events of the am.  She denied difficulty with speech or language. She denied difficulty with swallowing. She followed simple commands WNL. Her voice was mildly breathy with low intensity. She demonstrated decreased sustained attention and moved herself about in the bed as assessment progressed. Intermittent language of " confusion evident t/o conversational tasks. EEG tech entered into the room to continue assessment. SLP explained she would return to room later service day pending clearance for PO trials.   Upon return to room, Patient unavailable (MD rounding with Pt, PT/OT therapy at the bedside.)   Upon later attempt, Patient found upright in bed with call light in reach. She endorsed an appetite. She willingly accepted PO trials. Pt required occasional cues to refrain from talking while PO trials in oral cavity.  She demonstrated decreased coordination with RUE which required hand-over-hand assistance for bites/sips. SLP educated Pt on SLP role, aspiration precautions, S/S aspiration, safety precautions and ongoing sLP POC. Pt with partial verbal understanding. RN entered into the room and notified of finding/recommendations. Whiteboard updated. MD notified of findings/recommendations upon SLP exit .     Goals:   Multidisciplinary Problems       SLP Goals          Problem: SLP    Goal Priority Disciplines Outcome   SLP Goal     SLP Progressing   Description: Speech Language Pathology Goals  Goals expected to be met by 8/14/24    1. Pt will tolerate least restrictive diet without overt S/S aspiration, Supervision  2. Pt will participate in full assessment of speech, language and cognition to determine ongoing POC needs  3 Educate Pt and family on aspiration precautions and SLP POC                         Plan:     Patient to be seen:  4 x/week   Plan of Care expires:  09/06/24  Plan of Care reviewed with:  patient   SLP Follow-Up:  Yes       Discharge recommendations:  Moderate Intensity Therapy     Time Tracking:     SLP Treatment Date:   08/07/24  Speech Start Time:  1004/ 11:16  Speech Stop Time:  1018 /11:34    Speech Total Time (min):  14 min/16 min    Billable Minutes: Eval Swallow and Oral Function 14 and Self Care/Home Management Training 16    08/07/2024

## 2024-08-07 NOTE — HPI
84-year-old female with a left hip hemiarthroplasty in June (by report had hemiarthroplasty of closed comminuted femoral neck hip fracture), paroxysmal atrial fibrillation (on Xarelto), hypothyroidism, dementia, and hyperlipidemia admitted to [facility] on August 4 with low hemoglobin.  Patient reported tarry stools for several days, but Hemoccult was negative in the emergency department.  She also noted discomfort and swelling around her left hip.  Labs in the emergency department were concerning for anemia with hemoglobin 6.5.  There was also concern for possible cystitis, and she was placed on ciprofloxacin.  CT of the head had no acute abnormalities, and CT of the left hip showed soft tissue fullness and fluid in the deep left pelvis and presacral region.  Initial EKG showed sinus rhythm.  She was transfused 2 units packed red blood cells and admitted to Hospital Medicine.  With transfusion, hemoglobin increased to 9.3.  She was confused during her stay.  She was taken for CT of the abdomen and pelvis on the morning of August 5 when she began to have seizure activity.  She had fluttering of her eyes and movement of her head along with dorsiflexion of both feet.  She was given Keppra and placed on twice daily Keppra.  MRI brain had no acute intracranial abnormality.     She underwent Neurology tele-consultation, and recommendations included additional Keppra along with Q 4 hour neuro checks and routine EEG.  She was lethargic and required repeated stimulation for her to participate during examination.  She was oriented to person and time.  She was able to follow simple commands.  Mentation appears to be slowly improving.      She underwent noncontrasted CT of the abdomen and pelvis that showed a subcutaneous fluid collection overlying the left hip which may represent resolving hematoma or postsurgical seroma.  There were also findings suspicious for intramuscular hematoma in the left iliopsoas, psoas, and  iliacus.  Transfer center spoke with Orthopedic Surgery at [facility].  At present, there does not appear to be surgical intervention indicated in the left hip.  Case also discussed with Interventional Radiology at [facility].  Monitor the trend in hemoglobin and hemodynamic status.  If she remains stable, continue monitoring.  If she has a significant drop in hemoglobin or show signs of hemodynamic instability, she may need CTA of the abdomen and pelvis (bleed protocol) to determine if IR intervention is indicated. Pt. Transferred toHospital Medicine at [facility] in step-down status for EEG and Neurology evaluation of new onset seizures as well as monitoring the trend in her hemoglobin.     August 5:  Sodium 135, potassium 3.2, chloride 104, CO2 23, BUN 12, creatinine 1.7, glucose 84, calcium 8.4, magnesium 1.4, phosphorus 3.2, AST 20, ALT, white blood cells 7.13, hemoglobin 9.3, hematocrit 27.9, platelets 242  -repeat CBC on the afternoon of August 5 had hemoglobin 10.7 and hematocrit 32.7 (improved from earlier).  -CT abdomen and pelvis without contrast noted small hypoattenuating nodule in the right hepatic lobe may represent a cyst.  Diverticulosis.  Recent right total left hip arthroplasty with poorly defined subcutaneous fluid collection overlying the left hip which may represent resolving hematoma or postsurgical seroma.  Postsurgical abscess not excludable.  Findings suspicious for intramuscular hematoma of the left iliopsoas, psoas, and iliacus muscles with potential active hemorrhage.  -MRI brain was motion limited.  No acute intracranial abnormality.  Cortical atrophy with periventricular deep white matter change consistent with chronic small-vessel ischemic disease.     August 4: Blood cultures with no growth to date, INR 1.8, iron saturation 19%, stool for occult blood negative, lactic acid 0.7, hemoglobin 6.7, hematocrit 20.5, procalcitonin 0.26  -urinalysis with 2+ protein, trace  blood, trace leukocytes, 4 RBC, 8 WBC, many bacteria  -chest x-ray had no acute cardiopulmonary process identified  -CT head had no hemorrhage or other acute intracranial CT findings.  -CT left hip showed nonspecific soft tissue fullness to lateral hip measuring 9.4 x 8.7 x 4.8 cm.  Partially imaged slightly hyperattenuating fluid in the left deep pelvis and presacral region which may represent blood products.  Postsurgical changes of left hip arthroplasty.  -EKG showed normal sinus rhythm and appeared to be fairly normal.

## 2024-08-07 NOTE — PT/OT/SLP EVAL
"Occupational Therapy  Co- Evaluation    Name: Shaye Clemente "Samantha"  MRN: [id number]  Admitting Diagnosis: Encephalopathy, metabolic  Recent Surgery: * No surgery found *      Recommendations:     Discharge Recommendations: Moderate Intensity Therapy  Discharge Equipment Recommendations:  none  Barriers to discharge:  Other (Comment) (Unknown level of assistance at this time)    Assessment:     Shaye Clemente is a 84 y.o. female with a medical diagnosis of Encephalopathy, metabolic.  She presents with decreased self-care and functional mobility 2/2 acute medical status. Pt displays increased confusion this date AEB ANO x3. Pt noted to display increased SOB during activities, so activity duration decreased. Pt noted to display decreased SOB after return to supine. Pt required increased assistance for bed mobility and functional mobility 2/2 generalized weakness and increased fatigue. Performance deficits affecting function: weakness, impaired self care skills, impaired balance, decreased coordination, decreased safety awareness, impaired endurance, impaired functional mobility, decreased upper extremity function, pain, impaired coordination, gait instability, impaired cognition, decreased lower extremity function, impaired fine motor.  Patient currently demonstrates a need for moderate intensity therapy on a daily basis post acute secondary to a decline in functional status due to illness to increase ADL independence     Co-evaluation completed with PT due to patient medical instability, increased confusion, and to ensure patient safety.     Rehab Prognosis: Good; patient would benefit from acute skilled OT services to address these deficits and reach maximum level of function.       Plan:     Patient to be seen 4 x/week to address the above listed problems via therapeutic activities, therapeutic exercises, self-care/home management  Plan of Care Expires: 09/07/24  Plan of Care Reviewed with:      Subjective     Chief " Complaint: Left hip pain while seated EOB  Patient/Family Comments/goals: n/a    Occupational Profile: gathered from previous therapy note and confirmed with pt  Living Environment: Resides with spouse in [facility] with 3 FAISAL and no hand rails; tub/shower combination with no equipment  Previous level of function: Independent prior to L hip procedure  Roles and Routines: n/a, drove prior to hip procedure  Equipment Used at Home: none, per pt - has rolling walker and w/c but does not use them  Assistance upon Discharge: Unknown at this time    Pain/Comfort:  Pain Rating 1:  (Unrated at L hip with seated EOB activity, subsiting upon return to supine)  Pain Addressed 1: Reposition    Patients cultural, spiritual, Jehovah's witness conflicts given the current situation: no    Objective:     Communicated with: Nursing prior to session.  Patient found HOB elevated with PICC line, schreiber catheter, telemetry upon OT entry to room.    General Precautions: Standard, fall  Orthopedic Precautions: N/A  Braces: N/A  Respiratory Status: Room air    Occupational Performance:    Bed Mobility:    Patient completed Scooting/Bridging with maximal assistance and 2 persons to return to supine 2/2 increased fatigue at end of session  Patient completed Supine to Sit with moderate assistance and 2 persons  Patient completed Sit to Supine with maximal assistance and 2 persons     Functional Mobility/Transfers:  Patient completed Sit <> Stand Transfer with moderate assistance and of 2 persons  with  no assistive device   Functional Mobility: Pt completed 4 L lateral steps w/ moderate assistance of 2 persons 2/2 increased LLE pain and decreased functional mobility independence      Cognitive/Visual Perceptual:  Cognitive/Psychosocial Skills:     -       Oriented to: Person, Place, and Situation   -       Follows Commands/attention:Easily distracted and Follows one-step commands  -       Communication: clear/fluent  -       Memory: Impaired STM and Impaired  LTM,  pt noted to display increased confusion and min impulsivity but able to be redirected  -       Safety awareness/insight to disability: impaired   Visual/Perceptual:      -Intact     Physical Exam:  Balance: -       Static sitting balance: CGA; dynamic sitting balance: moderate assistance 2/2 posterior lean and decreased awareness of body positioning; static/dynamic standing balance: moderate assistance of 2 persons 2/2 posterior lean and decreased functional mobility   Sensation:    -       Intact  Upper Extremity Range of Motion:     -       Right Upper Extremity: WFL  -       Left Upper Extremity: Deficits: pt reporting congenital deficits of LUE ROM (decreased LUE shoulder flexion, bicep flexion WFL)  Upper Extremity Strength:    -       Right Upper Extremity: Grossly 4/5  -       Left Upper Extremity: deficits at baseline   Strength:    -       Right Upper Extremity: WFL  -       Left Upper Extremity: WFL  Fine Motor Coordination:    -       Impaired  Left hand thumb/finger opposition skills increased time and decreased accuracy and Right hand thumb/finger opposition skills increased time and decreased accuracy    AMPAC 6 Click ADL:  AMPAC Total Score: 15    Treatment & Education:  -Treatment this date included initial evaluation and establishment of POC as well as functional mobility activities  - Pt educated on OT roles and POC this date, w/ pt verbalizing understanding    Patient left HOB elevated with all lines intact, call button in reach, and bed alarm on    GOALS:   Multidisciplinary Problems       Occupational Therapy Goals          Problem: Occupational Therapy    Goal Priority Disciplines Outcome Interventions   Occupational Therapy Goal     OT, PT/OT Progressing    Description: Goals to be met by: 09/07/2024     Patient will increase functional independence with ADLs by performing:    LE Dressing with Minimal Assistance  to lucie socks  Grooming while standing at sink with Contact Guard  Assistance.  Toileting from toilet with Contact Guard Assistance for hygiene and clothing management.   Step transfer with Contact Guard Assistance  Toilet transfer to toilet with Contact Guard Assistance.                         History:     Past Medical History:   Diagnosis Date    Mumps without complication     Paroxysmal atrial fibrillation          Past Surgical History:   Procedure Laterality Date    BREAST SURGERY Right     partial    HEMIARTHROPLASTY, HIP, POSTERIOR APPROACH Left 06/04/2024    HYSTERECTOMY      TONSILLECTOMY      VAGINECTOMY         Time Tracking:     OT Date of Treatment: 08/07/24  OT Start Time: 1051  OT Stop Time: 1114  OT Total Time (min): 23 min    Billable Minutes:Evaluation 10  Therapeutic Activity 13    8/7/2024

## 2024-08-07 NOTE — CARE UPDATE
I have reviewed the chart of Shaye Clemente who is hospitalized for the following:    Active Hospital Problems    Diagnosis    *Encephalopathy, metabolic    Hyponatremia     Na 135  Monitor with daily cmp  improving      Hypokalemia     K 3.2 POA  Monitor with daily cmp  replace      Hypothyroid     On synthroid       Hip hematoma, left    LUIS CARLOS (acute kidney injury)    Seizure    Paroxysmal atrial fibrillation    Acute cystitis with hematuria        Mikala Brar NP  [facility]

## 2024-08-07 NOTE — ASSESSMENT & PLAN NOTE
Starting Synthroid 88 mcg   Following up with a pharmacy consult for home medication reconciliation

## 2024-08-07 NOTE — PROCEDURES
EEG REPORT      Shaye Clemente  [id number]  1940    DATE OF SERVICE: 8/7/2024         METHODOLOGY      Extended electroencephalographic recording is made while the patient is ambulatory and continuing normal daily activities.  Electrodes are placed according to the International 10-20 placement system and included T1 and T2 electrode placement.  Twenty four (24) channels of digital signal (sampling rate of 512/sec) was simultaneously recorded from the scalp including EKG and eye monitors.  Recording band pass was 0.1 to 100 hz and all data was stored digitally on the recorder.  The patient is instructed to press an event button when clinical symptoms occur and write the symptoms into a diary. Activation procedures which include photic stimulation, hyperventilation and instructing patients to perform simple task are done in selected patients.        The EEG is displayed on a monitor screen and can be reformatted into different montages for evaluation.  The entire recoding is submitted for computer assisted analysis to detect spike and electrographic seizure activity.  The entire recording is visually reviewed and the times identified by computer analysis as being spikes or seizures are reviewed again.  Compresses spectral analysis (CSA) is also performed on the activity recorded from each individual channel.  This is displayed as a power display of frequencies from 0 to 30 Hz over time.   The CSA analysis is done and displayed continuously.  This is reviewed for asymmetries in power between homologous areas of the scalp and for presence of changes in power which canbe seen when seizures occur.  Sections of suspected abnormalities on the CSA is then compared with the original EEG recording.  .     [employer] software was also utilized in the review of this study.  This software suite analyzes the EEG recording in multiple domains.  Coherence and rhythmicity is computed to identify EEG sections which may contain  organized seizures.  Each channel undergoes analysis to detect presence of spike and sharp waves which have special and morphological characteristic of epileptic activity.  The routine EEG recording is converted from spacial into frequency domain.  This is then displayed comparing homologous areas to identify areas of significant asymmetry.  Algorithm to identify non-cortically generated artifact is used to separate eye movement, EMG and other artifact from the EEG     Recording Times    A total of 1:16:41 hours of EEG was recorded.      EEG FINDINGS:  Background activity:   The background rhythm was characterized by alpha and anterior dominant beta activity with a 10Hz posterior dominant alpha rhythm at 30-70 microvolts.   Symmetry and continuity: the background was continuous and symmetric     Sleep:   No sleep transients were seen.    Activation procedures:   Answers questions correctly    Abnormal activity:   No epileptiform discharges, periodic discharges, lateralized rhythmic delta activity or electrographic seizures were seen.    IMPRESSION:   Normal EEG of the waking state.      Dago Wolf MD  Neurology-Epilepsy.  [facility].

## 2024-08-07 NOTE — NURSING
Nurses Note -- 4 Eyes      8/6/2024   10:31 PM      Skin assessed during: Q Shift Change      [] No Altered Skin Integrity Present    []Prevention Measures Documented      [] Yes- Altered Skin Integrity Present or Discovered   [] LDA Added if Not in Epic (Describe Wound)   [] New Altered Skin Integrity was Present on Admit and Documented in LDA   [] Wound Image Taken    Wound Care Consulted? No    Attending Nurse:  ROMINA Hills     Second RN/Staff Member:  ROMINA Menon

## 2024-08-07 NOTE — ASSESSMENT & PLAN NOTE
-Pt. With intermittent confusion, seizure activity. Mentation seems to be improving with time after seizure   -do not suspect true UTI as cause given very low colony count  -MRI brain negative for acute findings.   -TSH markedly elevated, see below for further treatment, could have been easily contributing factor  -Delirium precautions ordered

## 2024-08-07 NOTE — ASSESSMENT & PLAN NOTE
First Time Seizure  Ms. Shaye Clemente is a 84 y.o. patient transferred from OSH for first-time seizure like activity and AMS. Managed as an outpatient for dementia on memantine, however, no proper diagnosis has made yet. On evaluation patient is at baseline and placed on EEG, which has been negative without any seizures. On admission treated for UTI with levofloxacin, previously managed with ciprofloxacin at OSH. CTH negative and brain MRI with cortical atrophy with chronic ischemic changes. No prior history of seizures. However at OSH loaded with 1.5g Keppra and transferred on 750mg to [facility] EEG from admission negative for seizure like activity with slowing background suggesting encephalopathic process.     Current episode of first time seizure may be related to provoked seizures secondary to infectious process (UTI). Will consider decreasing the dose of AED given the fact of possible negative cortical synergy between baseline dementia and AED.     Plan   -Decrease Keppra 500mg BID and continue treating for 6 months   -Continue treating UTI   -Neurology will signoff. Please reach out to any questions or concerns

## 2024-08-07 NOTE — ASSESSMENT & PLAN NOTE
-Hx of pAfib. EKG on admit showed NSR, rhythm currently regular  -Continue home metoprolol and monitor on telemetry.  Holding Xarelto due to bleeding concerns-this was affirmed by the daughter via phone

## 2024-08-07 NOTE — ASSESSMENT & PLAN NOTE
-U/A with only 8 WBC, but many bacteria. Continue IV levofloxacin started at OSH for treatment and f/u urine culture

## 2024-08-07 NOTE — SUBJECTIVE & OBJECTIVE
Past Medical History:   Diagnosis Date    Mumps without complication     Paroxysmal atrial fibrillation        Past Surgical History:   Procedure Laterality Date    BREAST SURGERY Right     partial    HEMIARTHROPLASTY, HIP, POSTERIOR APPROACH Left 06/04/2024    HYSTERECTOMY      TONSILLECTOMY      VAGINECTOMY         Review of patient's allergies indicates:   Allergen Reactions    Prilosec [omeprazole]     Augmentin [amoxicillin-pot clavulanate] Nausea And Vomiting    Codeine Rash    Sulfa (sulfonamide antibiotics) Rash       Current Facility-Administered Medications on File Prior to Encounter   Medication    [DISCONTINUED] 0.9%  NaCl infusion (for blood administration)    [DISCONTINUED] 0.9%  NaCl infusion (for blood administration)    [DISCONTINUED] acetaminophen tablet 650 mg    [DISCONTINUED] albuterol-ipratropium 2.5 mg-0.5 mg/3 mL nebulizer solution 3 mL    [DISCONTINUED] aluminum-magnesium hydroxide-simethicone 200-200-20 mg/5 mL suspension 30 mL    [DISCONTINUED] calcium carbonate 200 mg calcium (500 mg) chewable tablet 500 mg    [DISCONTINUED] dextrose 5 % and 0.9 % NaCl infusion    [DISCONTINUED] glucagon (human recombinant) injection 1 mg    [DISCONTINUED] hydrALAZINE injection 5 mg    [DISCONTINUED] HYDROcodone-acetaminophen 5-325 mg per tablet 1 tablet    [DISCONTINUED] levETIRAcetam injection 750 mg    [DISCONTINUED] levoFLOXacin 750 mg/150 mL IVPB 750 mg    [DISCONTINUED] LORazepam (ATIVAN) injection 2 mg    [DISCONTINUED] melatonin tablet 6 mg    [DISCONTINUED] mometasone 0.1% cream    [DISCONTINUED] morphine injection 2 mg    [DISCONTINUED] mupirocin 2 % ointment    [DISCONTINUED] naloxone 0.4 mg/mL injection 0.02 mg    [DISCONTINUED] ondansetron injection 4 mg    [DISCONTINUED] prochlorperazine injection Soln 5 mg     Current Outpatient Medications on File Prior to Encounter   Medication Sig    0.9% NaCl PgBk 100 mL with ertapenem 1 gram SolR 1 g Inject 1 g into the vein once daily.     albuterol-ipratropium (DUO-NEB) 2.5 mg-0.5 mg/3 mL nebulizer solution Take 3 mLs by nebulization every 3 (three) hours as needed for Wheezing. Rescue    amino acids-protein hydrolys (PRO-STAT AWC)  gram-kcal/30 mL Liqd Take 30 mLs by mouth 2 (two) times a day. For wound healing.    betamethasone valerate 0.1% (VALISONE) 0.1 % Crea Apply 0.1 % topically 2 (two) times daily. Apply to posterior trunk/back    CALCIUM CARBONATE ORAL Take 1 tablet by mouth 2 (two) times daily as needed for Heartburn.    diphenhydrAMINE (BENADRYL) 25 mg capsule Take 25 mg by mouth every 6 (six) hours as needed for Itching.    docusate sodium (COLACE) 100 MG capsule Take 100 mg by mouth 2 (two) times daily.    DULOXETINE HCL, BULK, MISC by Misc.(Non-Drug; Combo Route) route.    ferrous sulfate (FEOSOL) 325 mg (65 mg iron) Tab tablet Take 325 mg by mouth daily with breakfast.    HYDROcodone-acetaminophen (NORCO) 5-325 mg per tablet Take 1 tablet by mouth every 6 (six) hours as needed for Pain.    ibandronate (BONIVA) 150 mg tablet Take 150 mg by mouth every 30 days.    levothyroxine (SYNTHROID) 50 MCG tablet Take 25 mcg by mouth before breakfast.    memantine (NAMENDA) 10 MG Tab Take 5 mg by mouth 2 (two) times daily.    metoprolol succinate (TOPROL-XL) 50 MG 24 hr tablet Take 50 mg by mouth once daily.    montelukast (SINGULAIR) 10 mg tablet Take 10 mg by mouth every evening.    polyethylene glycol (GLYCOLAX) 17 gram/dose powder Take 17 g by mouth once daily.    rivaroxaban (XARELTO) 20 mg Tab Take 20 mg by mouth daily with dinner or evening meal.    rosuvastatin (CRESTOR) 10 MG tablet Take 10 mg by mouth once daily.    Saccharomyces boulardii (FLORASTOR) 250 mg capsule Take 250 mg by mouth 2 (two) times daily.    tiotropium-olodateroL (STIOLTO RESPIMAT) 2.5-2.5 mcg/actuation Mist Inhale 2 puffs into the lungs Daily. Controller     Family History    None       Tobacco Use    Smoking status: Never    Smokeless tobacco: Never    Substance and Sexual Activity    Alcohol use: Not on file    Drug use: Not on file    Sexual activity: Not on file     Review of Systems   Constitutional:  Negative for activity change, appetite change, chills, fever and unexpected weight change.   HENT:  Negative for congestion and sore throat.    Respiratory:  Negative for cough and shortness of breath.    Cardiovascular:  Negative for chest pain, palpitations and leg swelling.   Gastrointestinal:  Negative for abdominal distention, abdominal pain, blood in stool, constipation, diarrhea, nausea and vomiting.   Genitourinary:  Negative for difficulty urinating, dysuria and hematuria.   Musculoskeletal:  Positive for arthralgias. Negative for myalgias.   Skin:  Negative for color change and rash.   Neurological:  Positive for seizures. Negative for dizziness and tremors.   Psychiatric/Behavioral:  Positive for confusion.      Objective:     Vital Signs (Most Recent):  Temp: 97.9 °F (36.6 °C) (08/06/24 1907)  Pulse: 77 (08/06/24 1953)  Resp: 18 (08/06/24 1907)  BP: (!) 179/77 (08/06/24 1953)  SpO2: 96 % (08/06/24 1907) Vital Signs (24h Range):  Temp:  [97.5 °F (36.4 °C)-97.9 °F (36.6 °C)] 97.9 °F (36.6 °C)  Pulse:  [61-77] 77  Resp:  [18-37] 18  SpO2:  [94 %-100 %] 96 %  BP: (146-193)/(62-77) 179/77     Weight: 83.5 kg (184 lb 1.4 oz)  Body mass index is 28.83 kg/m².     Physical Exam  Vitals reviewed.   Constitutional:       General: She is not in acute distress.     Appearance: She is well-developed.   HENT:      Head: Normocephalic and atraumatic.   Eyes:      Extraocular Movements: Extraocular movements intact.      Pupils: Pupils are equal, round, and reactive to light.   Neck:      Vascular: No JVD.      Trachea: No tracheal deviation.   Cardiovascular:      Rate and Rhythm: Normal rate and regular rhythm.      Heart sounds: No murmur heard.     No friction rub. No gallop.   Pulmonary:      Effort: No respiratory distress.      Breath sounds: Normal breath  sounds. No wheezing or rales.   Abdominal:      General: Bowel sounds are normal. There is no distension.      Palpations: Abdomen is soft. There is no mass.      Tenderness: There is no abdominal tenderness.   Musculoskeletal:         General: No deformity.      Cervical back: Neck supple.   Lymphadenopathy:      Cervical: No cervical adenopathy.   Skin:     General: Skin is warm and dry.      Findings: No rash.   Neurological:      Mental Status: She is alert. She is disoriented.      Comments: Alert to person, place in hospital and year but not date/month/circumstances behind hospitalization, mildly confused              CRANIAL NERVES     CN III, IV, VI   Pupils are equal, round, and reactive to light.       Significant Labs: All pertinent labs within the past 24 hours have been reviewed.    Significant Imaging: I have reviewed all pertinent imaging results/findings within the past 24 hours.

## 2024-08-07 NOTE — CONSULTS
"Yossi Vargas - Neurosurgery ([facility])  Neurology  Consult Note    Patient Name: Shaye Clemente  MRN: [id number]  Admission Date: 8/6/2024  Hospital Length of Stay: 1 days  Code Status: DNR   Attending Provider: Jame Tiwari MD   Consulting Provider: Alan Nicholas MD  Primary Care Physician: Skip Singh MD  Principal Problem:Seizure-like activity    Inpatient consult to Neurology  Consult performed by: Alan Nicholas MD  Consult ordered by: Wagner Joseph MD  Reason for consult: AMS and seizure like activity         Subjective:     Chief Complaint:  AMS and seizure like activity      HPI:   Ms. Shaye Clemente is a 84 y.o. patient with history p-afib (Xarelto), hypothyroidism, dementia, HLD. Presented to [facility] 8/4 with acute mental status changes ~ 2 days that worsened the day prior to admission. At OSH with UTI on UA managed on ciprofloxacin and switched to levofloxacin pending cultures. CTH negative. While in the ED at OSH, she was planned to have CT A/P to rule out organic causes of UTI. Was found to have episodes of confusion and had a first-time seizure episode with eyes rolling back and tonic posture on bilateral lower extremities.     Loaded with 1.5g Keppra and continued on 750mg BID. Brain MRI following the event with cortical atrophy with some periventricular deep white matter suggesting chronic small-vessel ischemic disease. Tele-Neurology was consulted and patient was placed on routine EEG and a transfer was made to [facility] for higher level of neurological care and closed monitoring.     Neurology consulted for "AMS with concern for seizures"      Past Medical History:   Diagnosis Date    Mumps without complication     Paroxysmal atrial fibrillation        Past Surgical History:   Procedure Laterality Date    BREAST SURGERY Right     partial    HEMIARTHROPLASTY, HIP, POSTERIOR APPROACH Left 06/04/2024    HYSTERECTOMY      TONSILLECTOMY      VAGINECTOMY         Review of patient's " allergies indicates:   Allergen Reactions    Prilosec [omeprazole]     Augmentin [amoxicillin-pot clavulanate] Nausea And Vomiting    Codeine Rash    Sulfa (sulfonamide antibiotics) Rash       Current Facility-Administered Medications on File Prior to Encounter   Medication    [DISCONTINUED] 0.9%  NaCl infusion (for blood administration)    [DISCONTINUED] 0.9%  NaCl infusion (for blood administration)    [DISCONTINUED] acetaminophen tablet 650 mg    [DISCONTINUED] albuterol-ipratropium 2.5 mg-0.5 mg/3 mL nebulizer solution 3 mL    [DISCONTINUED] aluminum-magnesium hydroxide-simethicone 200-200-20 mg/5 mL suspension 30 mL    [DISCONTINUED] calcium carbonate 200 mg calcium (500 mg) chewable tablet 500 mg    [DISCONTINUED] dextrose 5 % and 0.9 % NaCl infusion    [DISCONTINUED] glucagon (human recombinant) injection 1 mg    [DISCONTINUED] hydrALAZINE injection 5 mg    [DISCONTINUED] HYDROcodone-acetaminophen 5-325 mg per tablet 1 tablet    [DISCONTINUED] levETIRAcetam injection 750 mg    [DISCONTINUED] levoFLOXacin 750 mg/150 mL IVPB 750 mg    [DISCONTINUED] LORazepam (ATIVAN) injection 2 mg    [DISCONTINUED] melatonin tablet 6 mg    [DISCONTINUED] mometasone 0.1% cream    [DISCONTINUED] morphine injection 2 mg    [DISCONTINUED] mupirocin 2 % ointment    [DISCONTINUED] naloxone 0.4 mg/mL injection 0.02 mg    [DISCONTINUED] ondansetron injection 4 mg    [DISCONTINUED] prochlorperazine injection Soln 5 mg     Current Outpatient Medications on File Prior to Encounter   Medication Sig    0.9% NaCl PgBk 100 mL with ertapenem 1 gram SolR 1 g Inject 1 g into the vein once daily.    albuterol-ipratropium (DUO-NEB) 2.5 mg-0.5 mg/3 mL nebulizer solution Take 3 mLs by nebulization every 3 (three) hours as needed for Wheezing. Rescue    amino acids-protein hydrolys (PRO-STAT AWC)  gram-kcal/30 mL Liqd Take 30 mLs by mouth 2 (two) times a day. For wound healing.    betamethasone valerate 0.1% (VALISONE) 0.1 % Crea Apply 0.1 %  topically 2 (two) times daily. Apply to posterior trunk/back    CALCIUM CARBONATE ORAL Take 1 tablet by mouth 2 (two) times daily as needed for Heartburn.    diphenhydrAMINE (BENADRYL) 25 mg capsule Take 25 mg by mouth every 6 (six) hours as needed for Itching.    docusate sodium (COLACE) 100 MG capsule Take 100 mg by mouth 2 (two) times daily.    DULOXETINE HCL, BULK, MISC by Misc.(Non-Drug; Combo Route) route.    ferrous sulfate (FEOSOL) 325 mg (65 mg iron) Tab tablet Take 325 mg by mouth daily with breakfast.    HYDROcodone-acetaminophen (NORCO) 5-325 mg per tablet Take 1 tablet by mouth every 6 (six) hours as needed for Pain.    ibandronate (BONIVA) 150 mg tablet Take 150 mg by mouth every 30 days.    levothyroxine (SYNTHROID) 50 MCG tablet Take 25 mcg by mouth before breakfast.    memantine (NAMENDA) 10 MG Tab Take 5 mg by mouth 2 (two) times daily.    metoprolol succinate (TOPROL-XL) 50 MG 24 hr tablet Take 50 mg by mouth once daily.    montelukast (SINGULAIR) 10 mg tablet Take 10 mg by mouth every evening.    polyethylene glycol (GLYCOLAX) 17 gram/dose powder Take 17 g by mouth once daily.    rivaroxaban (XARELTO) 20 mg Tab Take 20 mg by mouth daily with dinner or evening meal.    rosuvastatin (CRESTOR) 10 MG tablet Take 10 mg by mouth once daily.    Saccharomyces boulardii (FLORASTOR) 250 mg capsule Take 250 mg by mouth 2 (two) times daily.    tiotropium-olodateroL (STIOLTO RESPIMAT) 2.5-2.5 mcg/actuation Mist Inhale 2 puffs into the lungs Daily. Controller     Family History    None       Tobacco Use    Smoking status: Never    Smokeless tobacco: Never   Substance and Sexual Activity    Alcohol use: Not on file    Drug use: Not on file    Sexual activity: Not on file     Review of Systems   Constitutional:  Negative for activity change, appetite change, chills, fever and unexpected weight change.   HENT:  Negative for congestion and sore throat.    Respiratory:  Negative for cough and shortness of breath.     Cardiovascular:  Negative for chest pain, palpitations and leg swelling.   Gastrointestinal:  Negative for abdominal distention, abdominal pain, blood in stool, constipation, diarrhea, nausea and vomiting.   Genitourinary:  Negative for difficulty urinating, dysuria and hematuria.   Musculoskeletal:  Negative for arthralgias and myalgias.   Skin:  Negative for color change and rash.   Neurological:  Negative for dizziness, tremors and seizures.   Psychiatric/Behavioral:  Positive for confusion.      Objective:     Vital Signs (Most Recent):  Temp: 97.3 °F (36.3 °C) (08/07/24 1533)  Pulse: 91 (08/07/24 1646)  Resp: 18 (08/07/24 1646)  BP: (!) 155/79 (08/07/24 1646)  SpO2: (!) 94 % (08/07/24 1533) Vital Signs (24h Range):  Temp:  [97.3 °F (36.3 °C)-98.7 °F (37.1 °C)] 97.3 °F (36.3 °C)  Pulse:  [76-91] 91  Resp:  [18] 18  SpO2:  [92 %-96 %] 94 %  BP: (145-184)/(70-98) 155/79     Weight: 83.5 kg (184 lb 1.4 oz)  Body mass index is 28.83 kg/m².     Physical Exam  Vitals reviewed.   Constitutional:       General: She is not in acute distress.     Appearance: She is well-developed.   HENT:      Head: Normocephalic and atraumatic.      Comments: EEG in place.   Eyes:      Extraocular Movements: Extraocular movements intact.      Pupils: Pupils are equal, round, and reactive to light.   Neck:      Vascular: No JVD.      Trachea: No tracheal deviation.   Cardiovascular:      Rate and Rhythm: Normal rate and regular rhythm.      Heart sounds: No murmur heard.     No friction rub. No gallop.   Pulmonary:      Effort: No respiratory distress.      Breath sounds: Normal breath sounds. No wheezing or rales.   Abdominal:      General: Bowel sounds are normal. There is no distension.      Palpations: Abdomen is soft. There is no mass.      Tenderness: There is no abdominal tenderness.   Musculoskeletal:         General: No deformity.      Cervical back: Neck supple.   Lymphadenopathy:      Cervical: No cervical adenopathy.    Skin:     General: Skin is warm and dry.      Findings: No rash.   Neurological:      Mental Status: She is alert. She is disoriented.      Cranial Nerves: Cranial nerves 2-12 are intact.      Deep Tendon Reflexes:      Reflex Scores:       Tricep reflexes are 2+ on the right side and 2+ on the left side.       Bicep reflexes are 2+ on the right side and 2+ on the left side.       Patellar reflexes are 2+ on the right side and 2+ on the left side.     Comments: Alert to person, place in hospital and year but not date/month/circumstances behind hospitalization, mildly confused   Psychiatric:         Speech: Speech is slurred.          NEUROLOGICAL EXAMINATION:     MENTAL STATUS   Oriented to person.   Oriented to place.   Concentration: decreased.   Speech: slurred   Level of consciousness: alert    CRANIAL NERVES   Cranial nerves II through XII intact.     CN III, IV, VI   Pupils are equal, round, and reactive to light.    MOTOR EXAM     Strength   Right deltoid: 5/5  Left deltoid: 5/5  Right biceps: 5/5  Left biceps: 5/5  Right triceps: 5/5  Left triceps: 5/5  Right quadriceps: 5/5  Left quadriceps: 5/5  Right hamstrin/5  Left hamstrin/5       Resting tremors.      REFLEXES     Reflexes   Right biceps: 2+  Left biceps: 2+  Right triceps: 2+  Left triceps: 2+  Right patellar: 2+  Left patellar: 2+    SENSORY EXAM        Patient not participating on sensory exam.        Significant Labs: All pertinent lab results from the past 24 hours have been reviewed.    Significant Imaging: I have reviewed and interpreted all pertinent imaging results/findings within the past 24 hours.  Assessment and Plan:     * Seizure-like activity  First Time Seizure  Ms. Shaye Clemente is a 84 y.o. patient transferred from OSH for first-time seizure like activity and AMS. Managed as an outpatient for dementia on memantine, however, no proper diagnosis has made yet. On evaluation patient is at baseline and placed on EEG, which has  been negative without any seizures. On admission treated for UTI with levofloxacin, previously managed with ciprofloxacin at OSH. CTH negative and brain MRI with cortical atrophy with chronic ischemic changes. No prior history of seizures. However at OSH loaded with 1.5g Keppra and transferred on 750mg to [facility]. EEG from admission negative for seizure like activity with slowing background suggesting encephalopathic process.     Current episode of first time seizure may be related to provoked seizures secondary to infectious process (UTI). Will consider decreasing the dose of AED given the fact of possible negative cortical synergy between baseline dementia and AED.     Plan   -Decrease Keppra 500mg BID and continue treating for 6 months   -Continue treating UTI   -Neurology will signoff. Please reach out to any questions or concerns         VTE Risk Mitigation (From admission, onward)           Ordered     Place sequential compression device  Until discontinued         08/06/24 7897                    Alan Nicholas MD  Neurology  [facility] - Neurosurgery ([facility])

## 2024-08-07 NOTE — PLAN OF CARE
Yossi Vargas - Neurosurgery ([facility])  Initial Discharge Assessment    Assessment completed at bedside with Pt, and all information on FaceSheet confirmed, including demographics, PCP, pharmacy and insurance. Pt has not addressed advance directives. She currently lives at [facility]) in [place] verified by . She sees the provider at . She denies any DME/HH/HD/Blood Thinners. [facility] will transport her back to facility at discharge. She had a recent hospitalization and readmission is complete. Plan for discharge is to return to [facility]. Case Management to continue to follow for discharge planning needs.        Primary Care Provider: Skip Singh MD    Admission Diagnosis: Seizure [R56.9]    Admission Date: 8/6/2024  Expected Discharge Date:     Transition of Care Barriers: None    Payor: HU[employer] MANAGED MEDICARE / Plan: HU[employer]NA MEDICARE PPO / Product Type: Medicare Advantage /     Extended Emergency Contact Information  Primary Emergency Contact: Ita Hyman  Mobile Phone: [phone]  Relation: Daughter  Preferred language: English   needed? No  Secondary Emergency Contact: Adriane Ledbetter  Mobile Phone: [phone]  Relation: Sister  Preferred language: English   needed? No    Discharge Plan A: Skilled Nursing Facility  Discharge Plan B: Home Health    No Pharmacies Listed    Initial Assessment (most recent)       Adult Discharge Assessment - 08/07/24 0850          Discharge Assessment    Assessment Type Discharge Planning Assessment     Confirmed/corrected address, phone number and insurance Yes     Confirmed Demographics Correct on Facesheet     Source of Information family     When was your last doctors appointment? --   Not sure. Pt has been at [facility] or at [facility] for the last couple of months.    Does patient/caregiver understand observation status Yes     Reason For Admission Symptomatic anemia     People in Home facility resident     Do  you expect to return to your current living situation? Yes     Do you have help at home or someone to help you manage your care at home? Yes     Who are your caregiver(s) and their phone number(s)? [facility] staff     Prior to hospitilization cognitive status: Alert/Oriented     Current cognitive status: Alert/Oriented     Walking or Climbing Stairs Difficulty yes     Walking or Climbing Stairs ambulation difficulty, assistance 1 person     Dressing/Bathing Difficulty no     Home Accessibility wheelchair accessible     Home Layout Able to live on 1st floor     Equipment Currently Used at Home none     Readmission within 30 days? Yes     Patient currently being followed by outpatient case management? No     Do you currently have service(s) that help you manage your care at home? No     Do you take prescription medications? Yes     Do you have prescription coverage? Yes     Coverage [employer] MANAGED MEDICARE - [employer] MEDICARE PPO -     Do you have any problems affording any of your prescribed medications? No     Is the patient taking medications as prescribed? yes     Who is going to help you get home at discharge? [facility] transport     How do you get to doctors appointments? family or friend will provide     Are you on dialysis? No     Do you take coumadin? No     Discharge Plan A Skilled Nursing Facility     Discharge Plan B Home Health     DME Needed Upon Discharge  none     Discharge Plan discussed with: Adult children     Name(s) and Number(s) Ita Hyman (Daughter)  [phone] (Mobile)     Transition of Care Barriers None

## 2024-08-07 NOTE — ASSESSMENT & PLAN NOTE
-Pt. With intermittent confusion, seizure activity. Mentation seems to be improving with time after seizure and treatment of UTI  -MRI brain negative for acute findings. Will order TSH for continued work-up. Other wise continue to treat UTI and work-up seizure disorder as below  -Delirium precautions ordered

## 2024-08-07 NOTE — PT/OT/SLP EVAL
Physical Therapy Co-Evaluation and Co-Treatment with OT    Patient Name:  Shaye Clemente   MRN:  [id number]    Recent Surgery: * No surgery found *      Patient required co-tx with OT secondary to need for multiple set of skilled hands to provide safest therapy and best outcomes.      Recommendations:     Discharge Recommendations:    Moderate intensity therapy  Discharge Equipment Recommendations: none   Barriers to discharge: Increased level of assist    Highest Level of Mobility: 5 lateral steps to the L along EOB  Assistance Required: Mod(A)X2 persons    Assessment:     Shaye Clemente is a 84 y.o. female admitted with a medical diagnosis of Encephalopathy, metabolic. She presents with the following impairments/functional limitations:  weakness, gait instability, impaired cardiopulmonary response to activity, impaired balance, impaired endurance, decreased lower extremity function, decreased safety awareness, impaired self care skills, impaired functional mobility, impaired cognition    Pt met with HOB elevated and agreeable to PT session. Pt reports her PLOF is (I) with functional mobility and ADLs prior to her hip sx in 6/24. Pt is A&Ox3, but is intermittently confused and does not appear to be a reliable historian. She is unable to recall whether or not she has been ambulating since her hip sx. Currently, pt requires mod(A)X2 persons for STS and lateral stepping along EOB. She is limited by generalized weakness and L hip pain during eval. Some dyspnea noted while pt was sitting EOB but she denied feeling SOB. Pt is a high fall risk at this time.    Pt would benefit from continued skilled acute PT 4x/wk to address above listed functional deficits, provide patient/caregiver education, reduce fall risk, and maximize (I) and safety with functional mobility.     Rehab Prognosis: Good; patient would benefit from acute skilled PT services to address these deficits and reach maximum level of function.      Plan:     During  "this hospitalization, patient to be seen 4 x/week to address the identified rehab impairments via gait training, therapeutic activities, neuromuscular re-education, therapeutic exercises and progress toward the following goals:    Plan of Care Expires:  09/07/24    This plan of care has been discussed with the patient/caregiver, who was included in its development and is in agreement with the identified goals and treatment plan.     Subjective     Communicated with RN prior to session.  Patient agreeable to participate.     Chief Complaint: Metabolic encephalopathy   Patient/Family Comments/goals: None stated    Pain/Comfort:  Pain Rating 1:  (unrated)  Location - Side 1: Left  Location - Orientation 1: generalized  Location 1: hip  Pain Addressed 1: Reposition, Distraction  Pain Rating Post-Intervention 1:  (unrated- pt resting comfortably at end of session)    Patients cultural, spiritual, Druze conflicts given the current situation: no    Patient's living environment is as follows: (Per patient)  Living Environment: Pt lives with her spouse in [facility] with 3 FAISAL, no HRs. Bathroom set-up: tub/shower combo  Prior Level of Function: Pt reports her PLOF is (I) with functional mobility and ADLs prior to her hip sx in 6/24. Pt is A&Ox3, but is intermittently confused and does not appear to be a reliable historian. She is unable to recall whether or not she has been ambulating since her hip sx.   DME used: none  DME owned (not currently used): rolling walker  Upon discharge, patient will have assistance from: Unknown    Objective:     Patient found HOB elevated with PICC line, shcreiber catheter, telemetry  upon PT entry to room.    General Precautions: Standard, fall   Orthopedic Precautions:N/A   Braces: N/A   BP (!) 184/76 (BP Location: Left arm, Patient Position: Lying)   Pulse 83   Temp 97.5 °F (36.4 °C) (Oral)   Resp 18   Ht 5' 7" (1.702 m)   Wt 83.5 kg (184 lb 1.4 oz)   SpO2 (!) 94%   BMI 28.83 kg/m²   Oxygen " Device:  room air      Exams:    Cognition:  Patient is oriented to Person, Place, Situation  Follows one-step commands  Insight to deficits/safety awareness: impaired    Edema: None present    Postural examination/scapula alignment: Rounded shoulder    Lower Extremity Range of Motion:  Right Lower Extremity: WNL  Left Lower Extremity: WNL    Lower Extremity Strength    Right LE  Left LE    Hip Flexion: 4/5 Hip Flexion: Painful   Knee Extension: 4/5 Knee Extension: 4-/5   Knee Flexion: 4/5 Knee Flexion: 4-/5   Ankle Dorsiflexion:  4/5 Ankle Dorsiflexion: 4-/5   Ankle Plantarflexion: 4/5 Ankle Plantarflexion: 4-/5        Sensation:   Light touch sensation: Intact BLEs    Functional Mobility:    Bed Mobility:  Supine to Sit: Moderate Assistance and 2 persons  on L side of bed  Sit to Supine: Maximum Assistance and 2 persons  Rolling L: Moderate Assistance  Scooting anteriorly to EOB to plant feet on floor: Moderate Assistance    Transfers:   Sit to Stand Transfer: Moderate Assistance and 2 persons   from EOB with HHAx2  L LE blocked             Gait:  Patient received gait training 5 lateral steps along EOB with Moderate Assistance and 2 persons  and  HHAx2  Gait Assessment: unsteady gait, decreased step length, narrow base of support, flexed posture, decreased jus, and decreased left knee stability  Gait Pattern Observed: Step-to  Comments: All lines remained intact throughout ambulation trial, gait belt utilized. PT provided assist for lateral weight shift, L LE with increased knee FL but no buckling    Balance:  Static Sit:   CGA-mod(A)  at EOB  Posterior lean  Dynamic sit:  Mod Assist     Therapeutic Activities/Exercises     Patient assisted with functional mobility as noted above  Discussed at length benefits of PT as well as d/c recommendations. Pt agreeable  Patient educated on the importance of early mobility, OOB to prevent functional decline during hospital stay  Patient was instructed to utilize staff  assistance for mobility/transfers.  Patient is appropriate to transfer with mod(A)X2 stand pivot and RN/PCT assist  Patient educated on PT POC and role of PT in acute care  White board updated to include patient's safest level of mobility with staff assistance, RN also updated    AM-PAC 6 CLICK MOBILITY  Turning over in bed (including adjusting bedclothes, sheets and blankets)?: 2  Sitting down on and standing up from a chair with arms (e.g., wheelchair, bedside commode, etc.): 2  Moving from lying on back to sitting on the side of the bed?: 2  Moving to and from a bed to a chair (including a wheelchair)?: 2  Need to walk in hospital room?: 2  Climbing 3-5 steps with a railing?: 1  Basic Mobility Total Score: 11      Patient left HOB elevated with all lines intact, call button in reach, bed alarm on, and RN notified.      History/Goals:     PAST MEDICAL HISTORY:  Past Medical History:   Diagnosis Date    Mumps without complication     Paroxysmal atrial fibrillation        Past Surgical History:   Procedure Laterality Date    BREAST SURGERY Right     partial    HEMIARTHROPLASTY, HIP, POSTERIOR APPROACH Left 2024    HYSTERECTOMY      TONSILLECTOMY      VAGINECTOMY         GOALS:   Multidisciplinary Problems       Physical Therapy Goals          Problem: Physical Therapy    Goal Priority Disciplines Outcome Goal Variances Interventions   Physical Therapy Goal     PT, PT/OT Progressing     Description: Goals to be met by: 24     Patient will increase functional independence with mobility by performin. Supine to sit with MInimal Assistance  2. Sit to supine with MInimal Assistance  3. Sit to stand transfer with Minimal Assistance  4. Bed to chair transfer with Minimal Assistance using LRAD as needed  5. Gait  x 150 feet with Minimal Assistance using LRAD as needed.   6. Ascend/descend 3 stair with no Handrails and Minimal Assistance  7. Lower extremity exercise program x15 reps per handout, with  assistance as needed                         Time Tracking:     PT Received On: 08/07/24  PT Start Time: 1051     PT Stop Time: 1114  PT Total Time (min): 23 min     Billable Minutes: Evaluation 10 and Therapeutic Exercise 13      Guillermina Garcia, PT  08/07/2024  Pager# [phone]

## 2024-08-07 NOTE — SUBJECTIVE & OBJECTIVE
Past Medical History:   Diagnosis Date    Mumps without complication     Paroxysmal atrial fibrillation        Past Surgical History:   Procedure Laterality Date    BREAST SURGERY Right     partial    HEMIARTHROPLASTY, HIP, POSTERIOR APPROACH Left 06/04/2024    HYSTERECTOMY      TONSILLECTOMY      VAGINECTOMY         Review of patient's allergies indicates:   Allergen Reactions    Prilosec [omeprazole]     Augmentin [amoxicillin-pot clavulanate] Nausea And Vomiting    Codeine Rash    Sulfa (sulfonamide antibiotics) Rash       Current Facility-Administered Medications on File Prior to Encounter   Medication    [DISCONTINUED] 0.9%  NaCl infusion (for blood administration)    [DISCONTINUED] 0.9%  NaCl infusion (for blood administration)    [DISCONTINUED] acetaminophen tablet 650 mg    [DISCONTINUED] albuterol-ipratropium 2.5 mg-0.5 mg/3 mL nebulizer solution 3 mL    [DISCONTINUED] aluminum-magnesium hydroxide-simethicone 200-200-20 mg/5 mL suspension 30 mL    [DISCONTINUED] calcium carbonate 200 mg calcium (500 mg) chewable tablet 500 mg    [DISCONTINUED] dextrose 5 % and 0.9 % NaCl infusion    [DISCONTINUED] glucagon (human recombinant) injection 1 mg    [DISCONTINUED] hydrALAZINE injection 5 mg    [DISCONTINUED] HYDROcodone-acetaminophen 5-325 mg per tablet 1 tablet    [DISCONTINUED] levETIRAcetam injection 750 mg    [DISCONTINUED] levoFLOXacin 750 mg/150 mL IVPB 750 mg    [DISCONTINUED] LORazepam (ATIVAN) injection 2 mg    [DISCONTINUED] melatonin tablet 6 mg    [DISCONTINUED] mometasone 0.1% cream    [DISCONTINUED] morphine injection 2 mg    [DISCONTINUED] mupirocin 2 % ointment    [DISCONTINUED] naloxone 0.4 mg/mL injection 0.02 mg    [DISCONTINUED] ondansetron injection 4 mg    [DISCONTINUED] prochlorperazine injection Soln 5 mg     Current Outpatient Medications on File Prior to Encounter   Medication Sig    0.9% NaCl PgBk 100 mL with ertapenem 1 gram SolR 1 g Inject 1 g into the vein once daily.     albuterol-ipratropium (DUO-NEB) 2.5 mg-0.5 mg/3 mL nebulizer solution Take 3 mLs by nebulization every 3 (three) hours as needed for Wheezing. Rescue    amino acids-protein hydrolys (PRO-STAT AWC)  gram-kcal/30 mL Liqd Take 30 mLs by mouth 2 (two) times a day. For wound healing.    betamethasone valerate 0.1% (VALISONE) 0.1 % Crea Apply 0.1 % topically 2 (two) times daily. Apply to posterior trunk/back    CALCIUM CARBONATE ORAL Take 1 tablet by mouth 2 (two) times daily as needed for Heartburn.    diphenhydrAMINE (BENADRYL) 25 mg capsule Take 25 mg by mouth every 6 (six) hours as needed for Itching.    docusate sodium (COLACE) 100 MG capsule Take 100 mg by mouth 2 (two) times daily.    DULOXETINE HCL, BULK, MISC by Misc.(Non-Drug; Combo Route) route.    ferrous sulfate (FEOSOL) 325 mg (65 mg iron) Tab tablet Take 325 mg by mouth daily with breakfast.    HYDROcodone-acetaminophen (NORCO) 5-325 mg per tablet Take 1 tablet by mouth every 6 (six) hours as needed for Pain.    ibandronate (BONIVA) 150 mg tablet Take 150 mg by mouth every 30 days.    levothyroxine (SYNTHROID) 50 MCG tablet Take 25 mcg by mouth before breakfast.    memantine (NAMENDA) 10 MG Tab Take 5 mg by mouth 2 (two) times daily.    metoprolol succinate (TOPROL-XL) 50 MG 24 hr tablet Take 50 mg by mouth once daily.    montelukast (SINGULAIR) 10 mg tablet Take 10 mg by mouth every evening.    polyethylene glycol (GLYCOLAX) 17 gram/dose powder Take 17 g by mouth once daily.    rivaroxaban (XARELTO) 20 mg Tab Take 20 mg by mouth daily with dinner or evening meal.    rosuvastatin (CRESTOR) 10 MG tablet Take 10 mg by mouth once daily.    Saccharomyces boulardii (FLORASTOR) 250 mg capsule Take 250 mg by mouth 2 (two) times daily.    tiotropium-olodateroL (STIOLTO RESPIMAT) 2.5-2.5 mcg/actuation Mist Inhale 2 puffs into the lungs Daily. Controller     Family History    None       Tobacco Use    Smoking status: Never    Smokeless tobacco: Never    Substance and Sexual Activity    Alcohol use: Not on file    Drug use: Not on file    Sexual activity: Not on file     Review of Systems   Constitutional:  Negative for activity change, appetite change, chills, fever and unexpected weight change.   HENT:  Negative for congestion and sore throat.    Respiratory:  Negative for cough and shortness of breath.    Cardiovascular:  Negative for chest pain, palpitations and leg swelling.   Gastrointestinal:  Negative for abdominal distention, abdominal pain, blood in stool, constipation, diarrhea, nausea and vomiting.   Genitourinary:  Negative for difficulty urinating, dysuria and hematuria.   Musculoskeletal:  Negative for arthralgias and myalgias.   Skin:  Negative for color change and rash.   Neurological:  Negative for dizziness, tremors and seizures.   Psychiatric/Behavioral:  Positive for confusion.      Objective:     Vital Signs (Most Recent):  Temp: 97.3 °F (36.3 °C) (08/07/24 1533)  Pulse: 91 (08/07/24 1646)  Resp: 18 (08/07/24 1646)  BP: (!) 155/79 (08/07/24 1646)  SpO2: (!) 94 % (08/07/24 1533) Vital Signs (24h Range):  Temp:  [97.3 °F (36.3 °C)-98.7 °F (37.1 °C)] 97.3 °F (36.3 °C)  Pulse:  [76-91] 91  Resp:  [18] 18  SpO2:  [92 %-96 %] 94 %  BP: (145-184)/(70-98) 155/79     Weight: 83.5 kg (184 lb 1.4 oz)  Body mass index is 28.83 kg/m².     Physical Exam  Vitals reviewed.   Constitutional:       General: She is not in acute distress.     Appearance: She is well-developed.   HENT:      Head: Normocephalic and atraumatic.      Comments: EEG in place.   Eyes:      Extraocular Movements: Extraocular movements intact.      Pupils: Pupils are equal, round, and reactive to light.   Neck:      Vascular: No JVD.      Trachea: No tracheal deviation.   Cardiovascular:      Rate and Rhythm: Normal rate and regular rhythm.      Heart sounds: No murmur heard.     No friction rub. No gallop.   Pulmonary:      Effort: No respiratory distress.      Breath sounds: Normal  breath sounds. No wheezing or rales.   Abdominal:      General: Bowel sounds are normal. There is no distension.      Palpations: Abdomen is soft. There is no mass.      Tenderness: There is no abdominal tenderness.   Musculoskeletal:         General: No deformity.      Cervical back: Neck supple.   Lymphadenopathy:      Cervical: No cervical adenopathy.   Skin:     General: Skin is warm and dry.      Findings: No rash.   Neurological:      Mental Status: She is alert. She is disoriented.      Cranial Nerves: Cranial nerves 2-12 are intact.      Deep Tendon Reflexes:      Reflex Scores:       Tricep reflexes are 2+ on the right side and 2+ on the left side.       Bicep reflexes are 2+ on the right side and 2+ on the left side.       Patellar reflexes are 2+ on the right side and 2+ on the left side.     Comments: Alert to person, place in hospital and year but not date/month/circumstances behind hospitalization, mildly confused   Psychiatric:         Speech: Speech is slurred.          NEUROLOGICAL EXAMINATION:     MENTAL STATUS   Oriented to person.   Oriented to place.   Concentration: decreased.   Speech: slurred   Level of consciousness: alert    CRANIAL NERVES   Cranial nerves II through XII intact.     CN III, IV, VI   Pupils are equal, round, and reactive to light.    MOTOR EXAM     Strength   Right deltoid: 5/5  Left deltoid: 5/5  Right biceps: 5/5  Left biceps: 5/5  Right triceps: 5/5  Left triceps: 5/5  Right quadriceps: 5/5  Left quadriceps: 5/5  Right hamstrin/5  Left hamstrin/5       Resting tremors.      REFLEXES     Reflexes   Right biceps: 2+  Left biceps: 2+  Right triceps: 2+  Left triceps: 2+  Right patellar: 2+  Left patellar: 2+    SENSORY EXAM        Patient not participating on sensory exam.        Significant Labs: All pertinent lab results from the past 24 hours have been reviewed.    Significant Imaging: I have reviewed and interpreted all pertinent imaging results/findings within  the past 24 hours.

## 2024-08-07 NOTE — ASSESSMENT & PLAN NOTE
-Reported seizure at [facility] prior to transfer pt. Was post-ictal and remains confused  -MRI brain uremarkable  -EEG so far unremarkable for any seizure activity   -currently on Keppra 500, follow up Neurology recommendations   -seizure precautions, fall precautions   -neuro checks

## 2024-08-07 NOTE — HOSPITAL COURSE
Patient maintained stable hemoglobin since admission to [facility].  Working well with speech therapy, tolerating bite size soft diet.  TSH noted to be markedly elevated up to 19,  Synthroid dosing increased.  Pt's mentation returned to baseline. IR unable to aspirate any fluid from left hip. Pt's CRP had markedly reduced while on carbapenem; ID felt acute osteomyelitis was possibly present. LLE USV was neg for DVT. Orthopedics affirmed conservative management.  Patient had PICC line placed on 08/19 with IV antibiotics course of meropenem arranged per ID recommendations.  Patient has met maximum benefit from hospitalization and is clinically stable for discharge.  Decision was made to keep pt off of her DOAC given hematoma; to f/u cardiology to reassess need for NOAC as well as ID.    Clear lungs bilaterally, unlabored breathing, on room air, no cyanosis   Heart sounds indicate a regular rate and rhythm  Awake alert, no acute distress   No facial droop, no slurred speech   5/5 proximal upper extremity strength bilaterally   4/5 hip flexor strength bilaterally   No obvious upper nor lower extremity edema

## 2024-08-07 NOTE — PLAN OF CARE
Problem: SLP  Goal: SLP Goal  Description: Speech Language Pathology Goals  Goals expected to be met by 8/14/24    1. Pt will tolerate least restrictive diet without overt S/S aspiration, Supervision  2. Pt will participate in full assessment of speech, language and cognition to determine ongoing POC needs  3 Educate Pt and family on aspiration precautions and SLP POC    Outcome: Progressing     SLP Bedside Swallow Evaluation initiated. No overt S/S aspiration with trials accepted at the bedside. Risk of aspiration remains due to underlying cognitive and respiratory status.  Full report to follow. REC: Level VI soft and bite sized textures, thin liquids, medications one at a time ok, provided strict 1:1 assistance with all PO and provided Pt is awake/alert/attentive, vital signs stable, upright, single bites/sips, monitor for S/S aspiration and strict aspiration precautions. Continue to monitor for signs and symptoms of aspiration and discontinue oral feeding should you notice any of the following: watery eyes, reddened facial area, wet vocal quality, increased work of breathing, change in respiratory status, increased congestion, coughing, fever and/or change in level of alertness.   ST to continue to follow.     8/7/2024

## 2024-08-07 NOTE — ASSESSMENT & PLAN NOTE
-unclear if baseline, creatinine initially elevated at 2.0 now improving down to 1.4,   -stopping IV fluids

## 2024-08-07 NOTE — ASSESSMENT & PLAN NOTE
"-Pt. Presented anemia, required 1 unit pRBC, Hgb now stable ~10. CT abdomen conerning for subcutaneous fluid collection overlying the left hip may represent resolving hematoma or postsurgical seroma andFindings suspicious for an intramuscular hematoma of the left iliopsoas, psoas and iliacus muscles with potential active hemorrhage.  -Case discussed with IR and ortho prior to transfer, plan "Monitor the trend in hemoglobin and hemodynamic status. If she remains stable, continue monitoring. If she has a significant drop in hemoglobin or show signs of hemodynamic instability, she may need CTA of the abdomen and pelvis (bleed protocol) to determine if IR intervention is indicated"        "

## 2024-08-08 LAB
ALBUMIN SERPL BCP-MCNC: 2.5 G/DL (ref 3.5–5.2)
ALLENS TEST: ABNORMAL
ALP SERPL-CCNC: 110 U/L (ref 55–135)
ALT SERPL W/O P-5'-P-CCNC: 10 U/L (ref 10–44)
ANION GAP SERPL CALC-SCNC: 8 MMOL/L (ref 8–16)
AST SERPL-CCNC: 25 U/L (ref 10–40)
BASOPHILS # BLD AUTO: 0.02 K/UL (ref 0–0.2)
BASOPHILS NFR BLD: 0.3 % (ref 0–1.9)
BILIRUB SERPL-MCNC: 0.8 MG/DL (ref 0.1–1)
BUN SERPL-MCNC: 15 MG/DL (ref 8–23)
CALCIUM SERPL-MCNC: 8.7 MG/DL (ref 8.7–10.5)
CHLORIDE SERPL-SCNC: 104 MMOL/L (ref 95–110)
CO2 SERPL-SCNC: 22 MMOL/L (ref 23–29)
CREAT SERPL-MCNC: 1.3 MG/DL (ref 0.5–1.4)
DIFFERENTIAL METHOD BLD: ABNORMAL
EOSINOPHIL # BLD AUTO: 0.1 K/UL (ref 0–0.5)
EOSINOPHIL NFR BLD: 1.3 % (ref 0–8)
ERYTHROCYTE [DISTWIDTH] IN BLOOD BY AUTOMATED COUNT: 22.2 % (ref 11.5–14.5)
EST. GFR  (NO RACE VARIABLE): 40.8 ML/MIN/1.73 M^2
GLUCOSE SERPL-MCNC: 121 MG/DL (ref 70–110)
HCO3 UR-SCNC: 22.3 MMOL/L (ref 24–28)
HCT VFR BLD AUTO: 32.1 % (ref 37–48.5)
HCT VFR BLD CALC: 29 %PCV (ref 36–54)
HGB BLD-MCNC: 10.1 G/DL (ref 12–16)
IMM GRANULOCYTES # BLD AUTO: 0.06 K/UL (ref 0–0.04)
IMM GRANULOCYTES NFR BLD AUTO: 0.8 % (ref 0–0.5)
LYMPHOCYTES # BLD AUTO: 0.9 K/UL (ref 1–4.8)
LYMPHOCYTES NFR BLD: 11.2 % (ref 18–48)
MAGNESIUM SERPL-MCNC: 2.1 MG/DL (ref 1.6–2.6)
MCH RBC QN AUTO: 30.6 PG (ref 27–31)
MCHC RBC AUTO-ENTMCNC: 31.5 G/DL (ref 32–36)
MCV RBC AUTO: 97 FL (ref 82–98)
MONOCYTES # BLD AUTO: 0.8 K/UL (ref 0.3–1)
MONOCYTES NFR BLD: 10.4 % (ref 4–15)
NEUTROPHILS # BLD AUTO: 5.9 K/UL (ref 1.8–7.7)
NEUTROPHILS NFR BLD: 76 % (ref 38–73)
NRBC BLD-RTO: 0 /100 WBC
PCO2 BLDA: 33 MMHG (ref 35–45)
PH SMN: 7.44 [PH] (ref 7.35–7.45)
PHOSPHATE SERPL-MCNC: 2.6 MG/DL (ref 2.7–4.5)
PLATELET # BLD AUTO: 230 K/UL (ref 150–450)
PMV BLD AUTO: 9.8 FL (ref 9.2–12.9)
PO2 BLDA: 29 MMHG (ref 40–60)
POC BE: -2 MMOL/L
POC IONIZED CALCIUM: 1.05 MMOL/L (ref 1.06–1.42)
POC SATURATED O2: 59 % (ref 95–100)
POC TCO2: 23 MMOL/L (ref 24–29)
POTASSIUM BLD-SCNC: 4 MMOL/L (ref 3.5–5.1)
POTASSIUM SERPL-SCNC: 3.2 MMOL/L (ref 3.5–5.1)
PROT SERPL-MCNC: 9.2 G/DL (ref 6–8.4)
RBC # BLD AUTO: 3.3 M/UL (ref 4–5.4)
SAMPLE: ABNORMAL
SITE: ABNORMAL
SODIUM BLD-SCNC: 143 MMOL/L (ref 136–145)
SODIUM SERPL-SCNC: 134 MMOL/L (ref 136–145)
WBC # BLD AUTO: 7.8 K/UL (ref 3.9–12.7)

## 2024-08-08 PROCEDURE — 25000003 PHARM REV CODE 250: Performed by: STUDENT IN AN ORGANIZED HEALTH CARE EDUCATION/TRAINING PROGRAM

## 2024-08-08 PROCEDURE — 99900035 HC TECH TIME PER 15 MIN (STAT)

## 2024-08-08 PROCEDURE — 92526 ORAL FUNCTION THERAPY: CPT

## 2024-08-08 PROCEDURE — 97530 THERAPEUTIC ACTIVITIES: CPT | Mod: CQ

## 2024-08-08 PROCEDURE — 83735 ASSAY OF MAGNESIUM: CPT | Performed by: HOSPITALIST

## 2024-08-08 PROCEDURE — 82330 ASSAY OF CALCIUM: CPT

## 2024-08-08 PROCEDURE — 25000003 PHARM REV CODE 250: Performed by: HOSPITALIST

## 2024-08-08 PROCEDURE — 97535 SELF CARE MNGMENT TRAINING: CPT

## 2024-08-08 PROCEDURE — 97112 NEUROMUSCULAR REEDUCATION: CPT

## 2024-08-08 PROCEDURE — 25000242 PHARM REV CODE 250 ALT 637 W/ HCPCS: Performed by: STUDENT IN AN ORGANIZED HEALTH CARE EDUCATION/TRAINING PROGRAM

## 2024-08-08 PROCEDURE — 92523 SPEECH SOUND LANG COMPREHEN: CPT

## 2024-08-08 PROCEDURE — 27000221 HC OXYGEN, UP TO 24 HOURS

## 2024-08-08 PROCEDURE — 11000001 HC ACUTE MED/SURG PRIVATE ROOM

## 2024-08-08 PROCEDURE — 84132 ASSAY OF SERUM POTASSIUM: CPT

## 2024-08-08 PROCEDURE — 85014 HEMATOCRIT: CPT

## 2024-08-08 PROCEDURE — 82803 BLOOD GASES ANY COMBINATION: CPT

## 2024-08-08 PROCEDURE — 63600175 PHARM REV CODE 636 W HCPCS: Performed by: STUDENT IN AN ORGANIZED HEALTH CARE EDUCATION/TRAINING PROGRAM

## 2024-08-08 PROCEDURE — 84100 ASSAY OF PHOSPHORUS: CPT | Performed by: HOSPITALIST

## 2024-08-08 PROCEDURE — 94640 AIRWAY INHALATION TREATMENT: CPT

## 2024-08-08 PROCEDURE — 63600175 PHARM REV CODE 636 W HCPCS: Performed by: HOSPITALIST

## 2024-08-08 PROCEDURE — 94761 N-INVAS EAR/PLS OXIMETRY MLT: CPT

## 2024-08-08 PROCEDURE — 84295 ASSAY OF SERUM SODIUM: CPT

## 2024-08-08 PROCEDURE — 80053 COMPREHEN METABOLIC PANEL: CPT | Performed by: HOSPITALIST

## 2024-08-08 PROCEDURE — 97116 GAIT TRAINING THERAPY: CPT | Mod: CQ

## 2024-08-08 PROCEDURE — 85025 COMPLETE CBC W/AUTO DIFF WBC: CPT | Performed by: HOSPITALIST

## 2024-08-08 PROCEDURE — 82800 BLOOD PH: CPT

## 2024-08-08 PROCEDURE — 36415 COLL VENOUS BLD VENIPUNCTURE: CPT | Performed by: HOSPITALIST

## 2024-08-08 RX ORDER — LEVETIRACETAM 500 MG/5ML
500 INJECTION, SOLUTION, CONCENTRATE INTRAVENOUS EVERY 12 HOURS
Status: DISCONTINUED | OUTPATIENT
Start: 2024-08-08 | End: 2024-08-12

## 2024-08-08 RX ORDER — DULOXETIN HYDROCHLORIDE 30 MG/1
30 CAPSULE, DELAYED RELEASE ORAL 2 TIMES DAILY
COMMUNITY

## 2024-08-08 RX ORDER — IPRATROPIUM BROMIDE AND ALBUTEROL SULFATE 2.5; .5 MG/3ML; MG/3ML
3 SOLUTION RESPIRATORY (INHALATION) ONCE
Status: COMPLETED | OUTPATIENT
Start: 2024-08-08 | End: 2024-08-08

## 2024-08-08 RX ORDER — SODIUM,POTASSIUM PHOSPHATES 280-250MG
2 POWDER IN PACKET (EA) ORAL EVERY 4 HOURS
Status: COMPLETED | OUTPATIENT
Start: 2024-08-08 | End: 2024-08-08

## 2024-08-08 RX ADMIN — POLYETHYLENE GLYCOL 3350 17 G: 17 POWDER, FOR SOLUTION ORAL at 08:08

## 2024-08-08 RX ADMIN — IPRATROPIUM BROMIDE AND ALBUTEROL SULFATE 3 ML: 2.5; .5 SOLUTION RESPIRATORY (INHALATION) at 08:08

## 2024-08-08 RX ADMIN — MEMANTINE 5 MG: 5 TABLET ORAL at 08:08

## 2024-08-08 RX ADMIN — METOPROLOL SUCCINATE 50 MG: 50 TABLET, EXTENDED RELEASE ORAL at 08:08

## 2024-08-08 RX ADMIN — POTASSIUM BICARBONATE 40 MEQ: 391 TABLET, EFFERVESCENT ORAL at 03:08

## 2024-08-08 RX ADMIN — MUPIROCIN: 20 OINTMENT TOPICAL at 09:08

## 2024-08-08 RX ADMIN — HYDRALAZINE HYDROCHLORIDE 5 MG: 20 INJECTION, SOLUTION INTRAMUSCULAR; INTRAVENOUS at 05:08

## 2024-08-08 RX ADMIN — FERROUS SULFATE TAB EC 325 MG (65 MG FE EQUIVALENT) 1 EACH: 325 (65 FE) TABLET DELAYED RESPONSE at 08:08

## 2024-08-08 RX ADMIN — POTASSIUM & SODIUM PHOSPHATES POWDER PACK 280-160-250 MG 2 PACKET: 280-160-250 PACK at 06:08

## 2024-08-08 RX ADMIN — LEVETIRACETAM 500 MG: 100 INJECTION INTRAVENOUS at 10:08

## 2024-08-08 RX ADMIN — LEVETIRACETAM 500 MG: 500 TABLET, FILM COATED ORAL at 08:08

## 2024-08-08 RX ADMIN — NITROFURANTOIN MONOHYDRATE/MACROCRYSTALS 100 MG: 25; 75 CAPSULE ORAL at 08:08

## 2024-08-08 RX ADMIN — LEVOTHYROXINE SODIUM 88 MCG: 88 TABLET ORAL at 05:08

## 2024-08-08 RX ADMIN — DOCUSATE SODIUM 100 MG: 100 CAPSULE, LIQUID FILLED ORAL at 08:08

## 2024-08-08 RX ADMIN — CEFTRIAXONE 1 G: 1 INJECTION, POWDER, FOR SOLUTION INTRAMUSCULAR; INTRAVENOUS at 10:08

## 2024-08-08 RX ADMIN — MUPIROCIN: 20 OINTMENT TOPICAL at 08:08

## 2024-08-08 RX ADMIN — POTASSIUM & SODIUM PHOSPHATES POWDER PACK 280-160-250 MG 2 PACKET: 280-160-250 PACK at 03:08

## 2024-08-08 NOTE — PLAN OF CARE
Problem: Adult Inpatient Plan of Care  Goal: Plan of Care Review  Outcome: Progressing  Goal: Patient-Specific Goal (Individualized)  Outcome: Progressing  Goal: Absence of Hospital-Acquired Illness or Injury  Outcome: Progressing  Goal: Optimal Comfort and Wellbeing  Outcome: Progressing  Goal: Readiness for Transition of Care  Outcome: Progressing     Problem: Infection  Goal: Absence of Infection Signs and Symptoms  Outcome: Progressing     Problem: Skin Injury Risk Increased  Goal: Skin Health and Integrity  Outcome: Progressing     Problem: Acute Kidney Injury/Impairment  Goal: Fluid and Electrolyte Balance  Outcome: Progressing  Goal: Improved Oral Intake  Outcome: Progressing  Goal: Effective Renal Function  Outcome: Progressing
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
## 2024-08-08 NOTE — PT/OT/SLP PROGRESS
"Physical Therapy Treatment  Co Treatment with Occupational Therapy    Patient Name:  Shaye Clemente   MRN:  [id number]    Recommendations:     Discharge Recommendations: Moderate Intensity Therapy  Discharge Equipment Recommendations: none  Barriers to discharge:  Increased level of assistance    Assessment:     Shaye Clemente is a 84 y.o. female admitted with a medical diagnosis of Seizure-like activity.  She presents with the following impairments/functional limitations: weakness, impaired self care skills, impaired balance, decreased coordination, decreased safety awareness, impaired endurance, impaired functional mobility, decreased upper extremity function, pain, impaired coordination, gait instability, impaired cognition, decreased lower extremity function, impaired fine motor.    Pt met with HOB elevated and agreeable to session. Pt initially confused with mumbling/dysarthric speech but becomes more oriented and understandable as session progresses. Pt tolerates session well with emphasis on bed mobility, transfers, and gait training. Pt making progress towards therapy goals as indicated by decreased assistance required with bed mobility and sit <>stand transfers. Pt will continue to benefit from skilled therapy services.    Rehab Prognosis: Good; patient would benefit from acute skilled PT services to address these deficits and reach maximum level of function.    Recent Surgery: * No surgery found *      Plan:     During this hospitalization, patient to be seen 4 x/week to address the identified rehab impairments via gait training, therapeutic activities, neuromuscular re-education, therapeutic exercises and progress toward the following goals:    Plan of Care Expires:  24    Subjective     Chief Complaint: L hip pain with abduction/moving L leg OOB  Patient/Family Comments/goals: "I miss my cat Frannie, she  this year"  Pain/Comfort:  Pain Rating 1: other (see comments) (pain not rated)  Location - Side 1: " "Left  Location - Orientation 1: generalized  Location 1: hip (with abduction)  Pain Addressed 1: Reposition, Distraction  Pain Rating Post-Intervention 1: other (see comments) (no further mention/ states "it doesn't hurt going back into bed")      Objective:     Communicated with RN (Maine) prior to session.  Patient found HOB elevated with bed alarm upon PTA entry to room.     General Precautions: Standard, fall  Orthopedic Precautions: N/A  Braces: N/A  Respiratory Status: Room air     Functional Mobility:  Bed Mobility:     Scooting: anteriorly to EOB maximal assistance x1 persons  Supine to Sit: moderate assistance x2 persons for Trunk/BLEs  Sit to Supine: moderate assistance x2 persons for Trunk/BLEs  Transfers:     Sit to Stand:  from EOB contact guard assistance with no AD  Gait:   Pt ambulates 5 L lateral steps with moderate assistance x2 persons and  HHA  Deviations noted: decreased hip extension, flexed posture  Verbal and tactile cues provided for upright posture and hip extension  All lines remained intact and gait belt utilized.  Balance:  Sitting Balance at EOB:  Level of Assist Required:   Static: moderate assistance  Dynamic: maximal assistance  Deviations noted: R posterior lean, pt guarding/attempting to shift weight off painful R hip, decreased protective response  Total Time Sitting EOB: ~15 minutes    Standing Balance:  Level of Assist Required: Moderate Assistance x 2 persons with HHA  Deviations noted: posterior lean, decreased hip extension, flexed posture  Total Time Sitting EOB: ~30 seconds      AM-PAC 6 CLICK MOBILITY  Turning over in bed (including adjusting bedclothes, sheets and blankets)?: 2  Sitting down on and standing up from a chair with arms (e.g., wheelchair, bedside commode, etc.): 3  Moving from lying on back to sitting on the side of the bed?: 2  Moving to and from a bed to a chair (including a wheelchair)?: 2  Need to walk in hospital room?: 2  Climbing 3-5 steps with a " railing?: 1  Basic Mobility Total Score: 12       Treatment & Education:  Patient provided with daily orientation and goals of this PT session.     Pt performs the following BLE therex while seated at EOB:  X10 AP, LAQ RLE AROM, LLE AAROM    Pt educated to call for assistance and to transfer with hospital staff only.  Also, pt was educated on the effects of prolonged immobility and the importance of performing OOB activity and exercises to promote healing and reduce recovery time.    Co tx performed with OT due to patient need for 2 skilled therapist to ensure patient and staff safety and to accommondate for activity tolerance.    Patient left HOB elevated with all lines intact, call button in reach, bed alarm on, and RN notified.    GOALS:   Multidisciplinary Problems       Physical Therapy Goals          Problem: Physical Therapy    Goal Priority Disciplines Outcome Goal Variances Interventions   Physical Therapy Goal     PT, PT/OT Progressing     Description: Goals to be met by: 24     Patient will increase functional independence with mobility by performin. Supine to sit with MInimal Assistance  2. Sit to supine with MInimal Assistance  3. Sit to stand transfer with Minimal Assistance  4. Bed to chair transfer with Minimal Assistance using LRAD as needed  5. Gait  x 150 feet with Minimal Assistance using LRAD as needed.   6. Ascend/descend 3 stair with no Handrails and Minimal Assistance  7. Lower extremity exercise program x15 reps per handout, with assistance as needed                         Time Tracking:     PT Received On: 24  PT Start Time: 1006     PT Stop Time: 1030  PT Total Time (min): 24 min     Billable Minutes: Gait Training 10 and Therapeutic Activity 14    Treatment Type: Treatment  PT/PTA: PTA     Number of PTA visits since last PT visit: 2024

## 2024-08-08 NOTE — SUBJECTIVE & OBJECTIVE
Interval History:   8/8: Mildly confused but redirectable.     Review of Systems   Constitutional:  Negative for activity change, appetite change, chills, fever and unexpected weight change.   HENT:  Negative for congestion and sore throat.    Respiratory:  Negative for cough and shortness of breath.    Cardiovascular:  Negative for chest pain, palpitations and leg swelling.   Gastrointestinal:  Negative for abdominal distention, abdominal pain, blood in stool, constipation, diarrhea, nausea and vomiting.   Genitourinary:  Negative for difficulty urinating, dysuria and hematuria.   Musculoskeletal:  Negative for arthralgias and myalgias.   Skin:  Negative for color change and rash.   Neurological:  Negative for dizziness, tremors and seizures.   Psychiatric/Behavioral:  Positive for confusion.      Objective:     Vital Signs (Most Recent):  Temp: 97.5 °F (36.4 °C) (08/08/24 0724)  Pulse: 80 (08/08/24 0724)  Resp: 20 (08/08/24 0724)  BP: (!) 173/90 (08/08/24 0724)  SpO2: (!) 93 % (08/08/24 0724) Vital Signs (24h Range):  Temp:  [97.5 °F (36.4 °C)-97.9 °F (36.6 °C)] 97.5 °F (36.4 °C)  Pulse:  [78-91] 80  Resp:  [16-20] 20  SpO2:  [93 %-96 %] 93 %  BP: (155-179)/(78-90) 173/90     Weight: 83.5 kg (184 lb 1.4 oz)  Body mass index is 28.83 kg/m².    Intake/Output Summary (Last 24 hours) at 8/8/2024 1556  Last data filed at 8/8/2024 0800  Gross per 24 hour   Intake --   Output 1000 ml   Net -1000 ml         Physical Exam  Vitals reviewed.   Constitutional:       General: She is not in acute distress.     Appearance: She is well-developed.   HENT:      Head: Normocephalic and atraumatic.      Comments: EEG in place.   Eyes:      Extraocular Movements: Extraocular movements intact.      Pupils: Pupils are equal, round, and reactive to light.   Neck:      Vascular: No JVD.      Trachea: No tracheal deviation.   Cardiovascular:      Rate and Rhythm: Normal rate and regular rhythm.      Heart sounds: No murmur heard.     No  friction rub. No gallop.   Pulmonary:      Effort: No respiratory distress.      Breath sounds: Normal breath sounds. No wheezing or rales.   Abdominal:      General: Bowel sounds are normal. There is no distension.      Palpations: Abdomen is soft. There is no mass.      Tenderness: There is no abdominal tenderness.   Musculoskeletal:         General: No deformity.      Cervical back: Neck supple.   Lymphadenopathy:      Cervical: No cervical adenopathy.   Skin:     General: Skin is warm and dry.      Findings: No rash.   Neurological:      Mental Status: She is alert. She is disoriented.      Cranial Nerves: Cranial nerves 2-12 are intact.      Deep Tendon Reflexes:      Reflex Scores:       Tricep reflexes are 2+ on the right side and 2+ on the left side.       Bicep reflexes are 2+ on the right side and 2+ on the left side.       Patellar reflexes are 2+ on the right side and 2+ on the left side.     Comments: Alert to person, place in hospital and year but not date/month/circumstances behind hospitalization, mildly confused   Psychiatric:         Speech: Speech is slurred.           Significant Labs: All pertinent labs within the past 24 hours have been reviewed.    Significant Imaging: I have reviewed all pertinent imaging results/findings within the past 24 hours.

## 2024-08-08 NOTE — PLAN OF CARE
Problem: SLP  Goal: SLP Goal  Description: Speech Language Pathology Goals  Goals expected to be met by 8/15/24  1. Pt will tolerate least restrictive diet without overt S/S aspiration, Supervision  2. Pt will participate in full assessment of speech, language and cognition to determine ongoing POC needs -met  3. Educate Pt and family on aspiration precautions and SLP POC  4. Pt will independently verbalize/demonstrate 3 speech strategies to increase intelligibility to 75% or greater in known and unknown contexts.   5. Pt will demonstrate adequate topic maintenance and/or attend to therapy tasks for 1+ minutes given no cues to redirect.   6. Pt will answer safety awareness/problem solving questions with 75% acc min cues.   7. Pt will follow 2+ step directions with 75% acc given 1 repetition.   8. Pt will complete functional sequencing tasks with 75% acc min cues.   Outcome: Progressing   Goals updated. ST will continue to follow.

## 2024-08-08 NOTE — NURSING
Nurses Note -- 4 Eyes      8/7/2024   8:32 PM      Skin assessed during: Q Shift Change      [x] No Altered Skin Integrity Present    []Prevention Measures Documented      [] Yes- Altered Skin Integrity Present or Discovered   [] LDA Added if Not in Epic (Describe Wound)   [] New Altered Skin Integrity was Present on Admit and Documented in LDA   [] Wound Image Taken    Wound Care Consulted? No    Attending Nurse:  ROMINA Hills    Second RN/Staff Member:  MILTON Grove

## 2024-08-08 NOTE — NURSING
Nurses Note -- 4 Eyes      8/8/2024   7:00 AM      Skin assessed during: Q Shift Change      [x] No Altered Skin Integrity Present    []Prevention Measures Documented      [] Yes- Altered Skin Integrity Present or Discovered   [] LDA Added if Not in Epic (Describe Wound)   [] New Altered Skin Integrity was Present on Admit and Documented in LDA   [] Wound Image Taken    Wound Care Consulted? No    Attending Nurse:  Maine Gallegos RN/Staff Member:  Radha VANEGAS

## 2024-08-08 NOTE — PT/OT/SLP PROGRESS
"Occupational Therapy   Co-Treatment    Name: Shaye Clemente  MRN: [id number]  Admitting Diagnosis:  Seizure-like activity       Recommendations:     Discharge Recommendations: Moderate Intensity Therapy  Discharge Equipment Recommendations:  none  Barriers to discharge:       Assessment:     Shaye Clemente is a 84 y.o. female with a medical diagnosis of Seizure-like activity.  Performance deficits affecting function are weakness, impaired self care skills, impaired balance, decreased coordination, decreased safety awareness, impaired endurance, impaired functional mobility, decreased upper extremity function, pain, impaired coordination, gait instability, impaired cognition, decreased lower extremity function, impaired fine motor. Pt agreeable to therapy and tolerated well. Pt with improvements in mentation  with improvements towards end of session. Pt with poor protection  reflexes sitting EOB and with R posterolateral leans during session. Pt with  impaired fine motor control when attempting to perform grooming. Pt remains limited in ADLs, functional mobility, and functional transfers. Patient continues to demonstrate the need for moderate intensity therapy on a daily basis post acute exhibited by decreased independence with self-care and functional mobility    Co-treat performed due to acuity and complexity of pt's medical status with 2 skilled disciplines needed to optimize pts occupational performance and  decreased activity tolerance.     Rehab Prognosis:  Good; patient would benefit from acute skilled OT services to address these deficits and reach maximum level of function.       Plan:     Patient to be seen 4 x/week to address the above listed problems via self-care/home management, therapeutic activities, neuromuscular re-education, therapeutic exercises  Plan of Care Expires: 09/07/24  Plan of Care Reviewed with: patient    Subjective     Chief Complaint: hip pain  Patient/Family Comments/goals: "I do not like " "watching Bluey."  Pain/Comfort:  Pain Rating 1:  (not rated)  Location - Side 1: Left  Location - Orientation 1: generalized  Location 1: hip  Pain Addressed 1: Reposition  Pain Rating Post-Intervention 1:  (not rated)    Objective:     Communicated with: RN prior to session.  Patient found HOB elevated with PICC line, telemetry upon OT entry to room.    General Precautions: Standard, fall    Orthopedic Precautions:N/A  Braces: N/A  Respiratory Status: Room air     Occupational Performance:     Bed Mobility:    Patient completed Scooting/Bridging with maximal assistance and  increased time  Patient completed Supine to Sit with moderate assistance and 2 persons HOB  elevated  Patient completed Sit to Supine with maximal assistance and 2 persons Bed flat    Functional Mobility/Transfers:  Patient completed Sit <> Stand Transfer x 2 with moderate assistance and of 2 persons  with  hand-held assist   Pt required CGA for lift but Mod A x2 to maintain static stance  Pt sat EOB and required Mod A for static sitting  balance and Max A for dynamic sitting balance 2/2 posterior lean, poor core strength,and diminished protection reactions and inability  to posturally correct.   Functional Mobility: Pt engaged in functional mobility standing EOB with ~ 5 steps  to the left with Mod A x2 and HHA  to maximize functional endurance and standing balance required for home/community mobility and occupational engagement.     Activities of Daily Living:  Grooming: maximal assistance pt performed oral hygiene sitting EOB with Max A for balance 2/2 posterior lean. Pt with impaired fine motor control using R hand with dysmetria bringing hand to mouth, requiring mod A for hand to mouth to place toothbrush  Lower Body Dressing: total assistance donned socks supine  in bed      [facility] 6 Click ADL: 15    Treatment & Education:  -Education on energy conservation and task modification to maximize safety and (I) during ADLs and " mobility  -Education on importance of OOB activity to improve overall activity tolerance and promote recovery  -Pt educated to call for assistance and to transfer with hospital staff only  -Provided education regarding role of OT, POC, & discharge recommendations with pt verbalizing understanding.  Pt had no further questions & when asked whether there were any concerns pt reported none.      Patient left HOB elevated with all lines intact, call button in reach, and RN notified    GOALS:   Multidisciplinary Problems       Occupational Therapy Goals          Problem: Occupational Therapy    Goal Priority Disciplines Outcome Interventions   Occupational Therapy Goal     OT, PT/OT Progressing    Description: Goals to be met by: 09/07/2024     Patient will increase functional independence with ADLs by performing:    LE Dressing with Minimal Assistance  to lucie socks  Grooming while standing at sink with Contact Guard Assistance.  Toileting from toilet with Contact Guard Assistance for hygiene and clothing management.   Step transfer with Contact Guard Assistance  Toilet transfer to toilet with Contact Guard Assistance.                         Time Tracking:     OT Date of Treatment: 08/08/24  OT Start Time: 1005  OT Stop Time: 1030  OT Total Time (min): 25 min    Billable Minutes:Self Care/Home Management 10 min  Neuromuscular Re-education 15 min    OT/THOM: OT          8/8/2024

## 2024-08-08 NOTE — CONSULTS
"PharmD Consult Received for: Medication Reconciliation:      Admission Medication History     The home medication history was taken by Ryne Valera.    You may go to "Admission" then "Reconcile Home Medications" tabs to review and/or act upon these items.     The home medication list has been updated by the Pharmacy department.   Please read ALL comments highlighted in yellow.   Please address this information as you see fit.    Feel free to contact us if you have any questions or require assistance.    Medications listed below were obtained from: Nursing home  PTA Medications   Medication Sig    0.9% NaCl PgBk 100 mL with ertapenem 1 gram SolR 1 g Inject 1 g into the vein once daily.    albuterol-ipratropium (DUO-NEB) 2.5 mg-0.5 mg/3 mL nebulizer solution Take 3 mLs by nebulization every 3 (three) hours as needed for Wheezing. Rescue    amino acids-protein hydrolys (PRO-STAT AWC)  gram-kcal/30 mL Liqd Take 30 mLs by mouth 2 (two) times a day. For wound healing.    betamethasone valerate 0.1% (VALISONE) 0.1 % Crea Apply 0.1 % topically 2 (two) times daily. Apply to posterior trunk/back    CALCIUM CARBONATE ORAL Take 1 tablet by mouth 2 (two) times daily as needed for Heartburn.    diphenhydrAMINE (BENADRYL) 25 mg capsule Take 25 mg by mouth every 6 (six) hours as needed for Itching.    docusate sodium (COLACE) 100 MG capsule Take 100 mg by mouth 2 (two) times daily.    DULoxetine (CYMBALTA) 30 MG capsule Take 30 mg by mouth 2 (two) times daily.    ferrous sulfate (FEOSOL) 325 mg (65 mg iron) Tab tablet Take 325 mg by mouth daily with breakfast.    HYDROcodone-acetaminophen (NORCO) 5-325 mg per tablet Take 1 tablet by mouth every 6 (six) hours as needed for Pain.    ibandronate (BONIVA) 150 mg tablet Take 150 mg by mouth every 30 days.    levothyroxine (SYNTHROID) 25 MCG tablet Take 25 mcg by mouth before breakfast.    memantine (NAMENDA) 10 MG Tab Take 10 mg by mouth 2 (two) times daily.    metoprolol " succinate 50 mg CSpX Take 50 mg by mouth once daily.    montelukast (SINGULAIR) 10 mg tablet Take 10 mg by mouth every evening.    polyethylene glycol (GLYCOLAX) 17 gram/dose powder Take 17 g by mouth once daily.    rivaroxaban (XARELTO) 20 mg Tab Take 20 mg by mouth daily with dinner or evening meal.    rosuvastatin (CRESTOR) 10 MG tablet Take 10 mg by mouth once daily.    Saccharomyces boulardii (FLORASTOR) 250 mg capsule Take 250 mg by mouth 2 (two) times daily.    tiotropium-olodateroL (STIOLTO RESPIMAT) 2.5-2.5 mcg/actuation Mist Inhale 2 puffs into the lungs Daily. Controller       Potential issues to be addressed PRIOR TO DISCHARGE  The listed medications were obtained from another facility. The patient may not have been able to fill these prescriptions prior to this admission and may require new scripts upon discharge.     Ryne Valera, PharmD  EXT: [id number]

## 2024-08-08 NOTE — CARE UPDATE
I have reviewed the chart of Shaye Clemente who is hospitalized for the following:    Active Hospital Problems    Diagnosis    *Seizure-like activity    Chronic anticoagulation     On xarelto      Hypophosphatemia     Monitor with daily labs  replace      Hemorrhagic disorder due to circulating anticoagulants     Patient hip hematoma, was on xarelto  Evaluate by ortho no acute intervention       Hyponatremia     Na 135  Monitor with daily cmp  improving      Hypokalemia     K 3.2 POA  Monitor with daily cmp  replace      Hypothyroidism     On synthroid       Dementia    Hypertension    Hip hematoma, left    Encephalopathy, metabolic    LUIS CARLOS (acute kidney injury)    Paroxysmal atrial fibrillation    Acute cystitis with hematuria        Mikala Brar NP  Unit Based [facility]

## 2024-08-08 NOTE — PLAN OF CARE
DEON received phone call from Shari at [facility].  She reported that they received auth for the patient to return.  Patient has apparently been at [facility] for about a month and was getting IV abx.  DEON reached out to MD to clarify if patient is cleared and what meds she will return on.  Pending clarification from provider.      2:35 PM  DEON placed phone call to sister Adriane to get more information about the patient's history.  She provided a timeline as follows:    June 2: patient broke her hip (she was living independently) across the way from her sister.      Had hip surgery at [facility] and went to [facility] for therapy.    Mid June:  developed an abscess, UTI and bactermia at [facility] and was sent to [facility] for treatment.      July 8th: discharged to [facility] for continued abx treatment and therapy.      Aug 6:  sent back to [facility] for seizure activity and transferred [place] [facility]      [facility] has obtained auth for the patient's return, but MD is working on reconciliation of medications.  DEON sent message to MD to call sister Adriane.  Anticipate return back to [facility] tomorrow (8/9).          Gladys Peterson, DEON  [facility]  [phone]

## 2024-08-09 LAB
ALBUMIN SERPL BCP-MCNC: 2.6 G/DL (ref 3.5–5.2)
ALLENS TEST: ABNORMAL
ALP SERPL-CCNC: 111 U/L (ref 55–135)
ALT SERPL W/O P-5'-P-CCNC: 11 U/L (ref 10–44)
ANION GAP SERPL CALC-SCNC: 8 MMOL/L (ref 8–16)
AST SERPL-CCNC: 26 U/L (ref 10–40)
BACTERIA #/AREA URNS AUTO: ABNORMAL /HPF
BASOPHILS # BLD AUTO: 0.05 K/UL (ref 0–0.2)
BASOPHILS NFR BLD: 0.4 % (ref 0–1.9)
BILIRUB SERPL-MCNC: 0.9 MG/DL (ref 0.1–1)
BILIRUB UR QL STRIP: NEGATIVE
BUN SERPL-MCNC: 16 MG/DL (ref 8–23)
CALCIUM SERPL-MCNC: 8.8 MG/DL (ref 8.7–10.5)
CHLORIDE SERPL-SCNC: 104 MMOL/L (ref 95–110)
CLARITY UR REFRACT.AUTO: CLEAR
CO2 SERPL-SCNC: 24 MMOL/L (ref 23–29)
COLOR UR AUTO: YELLOW
CREAT SERPL-MCNC: 1.3 MG/DL (ref 0.5–1.4)
DELSYS: ABNORMAL
DIFFERENTIAL METHOD BLD: ABNORMAL
EOSINOPHIL # BLD AUTO: 0 K/UL (ref 0–0.5)
EOSINOPHIL NFR BLD: 0 % (ref 0–8)
ERYTHROCYTE [DISTWIDTH] IN BLOOD BY AUTOMATED COUNT: 22.3 % (ref 11.5–14.5)
EST. GFR  (NO RACE VARIABLE): 40.8 ML/MIN/1.73 M^2
GLUCOSE SERPL-MCNC: 116 MG/DL (ref 70–110)
GLUCOSE UR QL STRIP: NEGATIVE
HCO3 UR-SCNC: 25.8 MMOL/L (ref 24–28)
HCT VFR BLD AUTO: 31.8 % (ref 37–48.5)
HCT VFR BLD CALC: 31 %PCV (ref 36–54)
HGB BLD-MCNC: 10.2 G/DL (ref 12–16)
HGB UR QL STRIP: ABNORMAL
HYALINE CASTS UR QL AUTO: 3 /LPF
IMM GRANULOCYTES # BLD AUTO: 0.13 K/UL (ref 0–0.04)
IMM GRANULOCYTES NFR BLD AUTO: 1.1 % (ref 0–0.5)
KETONES UR QL STRIP: NEGATIVE
LEUKOCYTE ESTERASE UR QL STRIP: NEGATIVE
LYMPHOCYTES # BLD AUTO: 0.9 K/UL (ref 1–4.8)
LYMPHOCYTES NFR BLD: 8 % (ref 18–48)
MAGNESIUM SERPL-MCNC: 1.8 MG/DL (ref 1.6–2.6)
MCH RBC QN AUTO: 31.1 PG (ref 27–31)
MCHC RBC AUTO-ENTMCNC: 32.1 G/DL (ref 32–36)
MCV RBC AUTO: 97 FL (ref 82–98)
MICROSCOPIC COMMENT: ABNORMAL
MONOCYTES # BLD AUTO: 0.9 K/UL (ref 0.3–1)
MONOCYTES NFR BLD: 8.1 % (ref 4–15)
NEUTROPHILS # BLD AUTO: 9.4 K/UL (ref 1.8–7.7)
NEUTROPHILS NFR BLD: 82.4 % (ref 38–73)
NITRITE UR QL STRIP: NEGATIVE
NRBC BLD-RTO: 0 /100 WBC
PCO2 BLDA: 35.2 MMHG (ref 35–45)
PH SMN: 7.47 [PH] (ref 7.35–7.45)
PH UR STRIP: 7 [PH] (ref 5–8)
PHOSPHATE SERPL-MCNC: 5.1 MG/DL (ref 2.7–4.5)
PLATELET # BLD AUTO: 245 K/UL (ref 150–450)
PMV BLD AUTO: 9.4 FL (ref 9.2–12.9)
PO2 BLDA: 53 MMHG (ref 80–100)
POC BE: 2 MMOL/L
POC IONIZED CALCIUM: 1.14 MMOL/L (ref 1.06–1.42)
POC SATURATED O2: 89 % (ref 95–100)
POC TCO2: 27 MMOL/L (ref 23–27)
POCT GLUCOSE: 123 MG/DL (ref 70–110)
POTASSIUM BLD-SCNC: 3.8 MMOL/L (ref 3.5–5.1)
POTASSIUM SERPL-SCNC: 3.9 MMOL/L (ref 3.5–5.1)
PROT SERPL-MCNC: 9.5 G/DL (ref 6–8.4)
PROT UR QL STRIP: ABNORMAL
RBC # BLD AUTO: 3.28 M/UL (ref 4–5.4)
RBC #/AREA URNS AUTO: 3 /HPF (ref 0–4)
SAMPLE: ABNORMAL
SITE: ABNORMAL
SODIUM BLD-SCNC: 139 MMOL/L (ref 136–145)
SODIUM SERPL-SCNC: 136 MMOL/L (ref 136–145)
SP GR UR STRIP: 1.01 (ref 1–1.03)
URN SPEC COLLECT METH UR: ABNORMAL
WBC # BLD AUTO: 11.45 K/UL (ref 3.9–12.7)
WBC #/AREA URNS AUTO: 2 /HPF (ref 0–5)

## 2024-08-09 PROCEDURE — 80053 COMPREHEN METABOLIC PANEL: CPT | Performed by: HOSPITALIST

## 2024-08-09 PROCEDURE — 25000003 PHARM REV CODE 250: Performed by: REGISTERED NURSE

## 2024-08-09 PROCEDURE — 92526 ORAL FUNCTION THERAPY: CPT

## 2024-08-09 PROCEDURE — 99223 1ST HOSP IP/OBS HIGH 75: CPT | Mod: ,,, | Performed by: REGISTERED NURSE

## 2024-08-09 PROCEDURE — 83735 ASSAY OF MAGNESIUM: CPT | Performed by: HOSPITALIST

## 2024-08-09 PROCEDURE — 25000003 PHARM REV CODE 250: Performed by: HOSPITALIST

## 2024-08-09 PROCEDURE — 27000221 HC OXYGEN, UP TO 24 HOURS

## 2024-08-09 PROCEDURE — 84132 ASSAY OF SERUM POTASSIUM: CPT

## 2024-08-09 PROCEDURE — 99900035 HC TECH TIME PER 15 MIN (STAT)

## 2024-08-09 PROCEDURE — 94761 N-INVAS EAR/PLS OXIMETRY MLT: CPT

## 2024-08-09 PROCEDURE — 11000001 HC ACUTE MED/SURG PRIVATE ROOM

## 2024-08-09 PROCEDURE — 63600175 PHARM REV CODE 636 W HCPCS: Performed by: HOSPITALIST

## 2024-08-09 PROCEDURE — 84295 ASSAY OF SERUM SODIUM: CPT

## 2024-08-09 PROCEDURE — 63600175 PHARM REV CODE 636 W HCPCS: Performed by: REGISTERED NURSE

## 2024-08-09 PROCEDURE — 85014 HEMATOCRIT: CPT

## 2024-08-09 PROCEDURE — 81001 URINALYSIS AUTO W/SCOPE: CPT | Performed by: PHYSICIAN ASSISTANT

## 2024-08-09 PROCEDURE — 82803 BLOOD GASES ANY COMBINATION: CPT

## 2024-08-09 PROCEDURE — 51798 US URINE CAPACITY MEASURE: CPT

## 2024-08-09 PROCEDURE — 85025 COMPLETE CBC W/AUTO DIFF WBC: CPT | Performed by: HOSPITALIST

## 2024-08-09 PROCEDURE — 36415 COLL VENOUS BLD VENIPUNCTURE: CPT | Performed by: HOSPITALIST

## 2024-08-09 PROCEDURE — 82330 ASSAY OF CALCIUM: CPT

## 2024-08-09 PROCEDURE — 63600175 PHARM REV CODE 636 W HCPCS: Performed by: PHYSICIAN ASSISTANT

## 2024-08-09 PROCEDURE — 51701 INSERT BLADDER CATHETER: CPT

## 2024-08-09 PROCEDURE — 63600175 PHARM REV CODE 636 W HCPCS: Performed by: STUDENT IN AN ORGANIZED HEALTH CARE EDUCATION/TRAINING PROGRAM

## 2024-08-09 PROCEDURE — 82565 ASSAY OF CREATININE: CPT

## 2024-08-09 PROCEDURE — 84100 ASSAY OF PHOSPHORUS: CPT | Performed by: HOSPITALIST

## 2024-08-09 PROCEDURE — 51702 INSERT TEMP BLADDER CATH: CPT

## 2024-08-09 PROCEDURE — 94640 AIRWAY INHALATION TREATMENT: CPT

## 2024-08-09 PROCEDURE — 82800 BLOOD PH: CPT

## 2024-08-09 PROCEDURE — 97535 SELF CARE MNGMENT TRAINING: CPT

## 2024-08-09 PROCEDURE — 36600 WITHDRAWAL OF ARTERIAL BLOOD: CPT

## 2024-08-09 PROCEDURE — 25000242 PHARM REV CODE 250 ALT 637 W/ HCPCS: Performed by: HOSPITALIST

## 2024-08-09 RX ORDER — HYDRALAZINE HYDROCHLORIDE 25 MG/1
25 TABLET, FILM COATED ORAL EVERY 8 HOURS PRN
Status: DISCONTINUED | OUTPATIENT
Start: 2024-08-09 | End: 2024-08-20 | Stop reason: HOSPADM

## 2024-08-09 RX ORDER — CARVEDILOL 12.5 MG/1
12.5 TABLET ORAL 2 TIMES DAILY
Status: DISCONTINUED | OUTPATIENT
Start: 2024-08-09 | End: 2024-08-20 | Stop reason: HOSPADM

## 2024-08-09 RX ORDER — HYDROMORPHONE HYDROCHLORIDE 1 MG/ML
0.5 INJECTION, SOLUTION INTRAMUSCULAR; INTRAVENOUS; SUBCUTANEOUS ONCE
Status: COMPLETED | OUTPATIENT
Start: 2024-08-09 | End: 2024-08-09

## 2024-08-09 RX ORDER — LEVOTHYROXINE SODIUM 25 UG/1
25 TABLET ORAL
Status: DISCONTINUED | OUTPATIENT
Start: 2024-08-10 | End: 2024-08-20 | Stop reason: HOSPADM

## 2024-08-09 RX ADMIN — HYDRALAZINE HYDROCHLORIDE 5 MG: 20 INJECTION, SOLUTION INTRAMUSCULAR; INTRAVENOUS at 11:08

## 2024-08-09 RX ADMIN — FERROUS SULFATE TAB EC 325 MG (65 MG FE EQUIVALENT) 1 EACH: 325 (65 FE) TABLET DELAYED RESPONSE at 08:08

## 2024-08-09 RX ADMIN — HYDROMORPHONE HYDROCHLORIDE 0.5 MG: 1 INJECTION, SOLUTION INTRAMUSCULAR; INTRAVENOUS; SUBCUTANEOUS at 02:08

## 2024-08-09 RX ADMIN — HYDRALAZINE HYDROCHLORIDE 5 MG: 20 INJECTION, SOLUTION INTRAMUSCULAR; INTRAVENOUS at 05:08

## 2024-08-09 RX ADMIN — MUPIROCIN: 20 OINTMENT TOPICAL at 09:08

## 2024-08-09 RX ADMIN — NITROFURANTOIN MONOHYDRATE/MACROCRYSTALS 100 MG: 25; 75 CAPSULE ORAL at 08:08

## 2024-08-09 RX ADMIN — MEMANTINE 5 MG: 5 TABLET ORAL at 07:08

## 2024-08-09 RX ADMIN — MEMANTINE 5 MG: 5 TABLET ORAL at 08:08

## 2024-08-09 RX ADMIN — LEVETIRACETAM 500 MG: 100 INJECTION INTRAVENOUS at 08:08

## 2024-08-09 RX ADMIN — ERTAPENEM 1 G: 1 INJECTION INTRAMUSCULAR; INTRAVENOUS at 11:08

## 2024-08-09 RX ADMIN — ATORVASTATIN CALCIUM 40 MG: 40 TABLET, FILM COATED ORAL at 07:08

## 2024-08-09 RX ADMIN — Medication 6 MG: at 07:08

## 2024-08-09 RX ADMIN — MUPIROCIN: 20 OINTMENT TOPICAL at 08:08

## 2024-08-09 RX ADMIN — HYDRALAZINE HYDROCHLORIDE 5 MG: 20 INJECTION, SOLUTION INTRAMUSCULAR; INTRAVENOUS at 12:08

## 2024-08-09 RX ADMIN — IPRATROPIUM BROMIDE AND ALBUTEROL SULFATE 3 ML: 2.5; .5 SOLUTION RESPIRATORY (INHALATION) at 12:08

## 2024-08-09 RX ADMIN — MONTELUKAST 10 MG: 10 TABLET, FILM COATED ORAL at 07:08

## 2024-08-09 RX ADMIN — DOCUSATE SODIUM 100 MG: 100 CAPSULE, LIQUID FILLED ORAL at 07:08

## 2024-08-09 RX ADMIN — METOPROLOL SUCCINATE 50 MG: 50 TABLET, EXTENDED RELEASE ORAL at 08:08

## 2024-08-09 RX ADMIN — LEVETIRACETAM 500 MG: 100 INJECTION INTRAVENOUS at 07:08

## 2024-08-09 RX ADMIN — CARVEDILOL 12.5 MG: 12.5 TABLET, FILM COATED ORAL at 06:08

## 2024-08-09 RX ADMIN — HYDROCODONE BITARTRATE AND ACETAMINOPHEN 1 TABLET: 5; 325 TABLET ORAL at 07:08

## 2024-08-09 RX ADMIN — MEROPENEM 2 G: 2 INJECTION, POWDER, FOR SOLUTION INTRAVENOUS at 05:08

## 2024-08-09 NOTE — ASSESSMENT & PLAN NOTE
83 yo female with PMH of left femoral neck fracture, s/p left bipolar hemiarthroplasty on 6/4/24, paroxysmal atrial fibrillation (on Xarelt), hypothyroidism, dementia, and hyperlipidemia admitted to [facility] (from [facility]) on August 4 with low hemoglobin. See HPI.     Ultimately transferred to [facility] as pt had witnessed seizure during CT at OSH, was transferred for neuro services. Pt with recent hx of left hip repair following fracture in 6/4/24. This was complicated by post op infection. Records are very limited at this time, though from what I am able to collect from limited information available to me is that pt had an episode of proteus vulgaris bacteremia on 6/24/24 as well as ecoli ESBL UTI on 6/25/24. She required transfer from her PT facility back to [facility] as her left hip was also red, swollen, tender and there was cf post op infection. Following transfer, pt underwent debridement of hip on 7/1/24 in which limited records indicate proteus, pseudomonas, staph hemolyticus, and enterococcus were isolated. Specific culture data is not available to me at this time to confirm this. Per record, it seems as pt was on ceftriaxone alone, and it is unclear when ertapenem was started.     After speaking with family, it was stated ertapenem was started d/t ESBL ecoli uti and it was going to continue for 6 weeks, ending in a week or so.      Like mentioned in HPI, pt had witnessed seizure event at [facility]. Unclear what provoked. EEG was negative, but was transferred to [facility] for routine EEG and further work up.      Pt pleasant at bedside. Continues to mumble and have intermittent confusion, though remains oriented to place, situation, person. Currently afebrile. HDS. Admit blood cultures negative. With + urine culture from OSH that is e facium. Pt denies urinary symptoms at this time. Imaging significant for CT AP noting poorly defined subq fluid collection overlying left hip which  "may reflect resolving hematoma or post surgical seroma. Read notes, unable to exclude post surgical abscess. Also noting that findings are suspicious for intramuscular hematoma of left iliopsoas, psoas, and iliacus mm with potential active hemorrhage.     Pt currently on ertapenem and macrobid. ID was consulted as "Wound infection. Was on ertapenem at nursing home. Should this be continued"    Recommendations:  - dc macrobid as likely not covering VRE on urine culture. Pt asymptomatic for UTI at this time. Consider repeating UA/culture if concerns arise.   - CT AP with cf subq fluid collection over left hip, as well as intramuscular hematoma of left hip mm. Consider ortho surgery and/or IR consult for joint aspiration to further guide antibiotic therapy .   - Suspect she was on ertapenem for post op wound infection following hip repair, though unclear. Ertapenem does not cover the bacteria that was noted following hip debridement, therefore recommend starting meropenem 2g IV q12hr for full coverage of documented organisms.   - carbapenems have low risk lowering seizure threshold, so recommend very close monitoring.   -  will need further records in order to give proper recommendation, specifically [facility] records including infectious disease and ortho surgery notes, as well as culture reports with sensitivities. Discussed with case mgmt as well as primary team  - plan reviewed with ID staff. ID will follow.   "

## 2024-08-09 NOTE — PROGRESS NOTES
[facility] - Neurosurgery ([facility])  [facility]  Progress Note    Patient Name: Shaye Clemente  MRN: [id number]  Patient Class: IP- Inpatient   Admission Date: 8/6/2024  Length of Stay: 3 days  Attending Physician: Traci Mancini MD  Primary Care Provider: Skip Singh MD        Subjective:     Principal Problem:Seizure-like activity        HPI:  84-year-old female with a left hip hemiarthroplasty in June (by report had hemiarthroplasty of closed comminuted femoral neck hip fracture), paroxysmal atrial fibrillation (on Xarelto), hypothyroidism, dementia, and hyperlipidemia admitted to [facility] on August 4 with low hemoglobin.  Patient reported tarry stools for several days, but Hemoccult was negative in the emergency department.  She also noted discomfort and swelling around her left hip.  Labs in the emergency department were concerning for anemia with hemoglobin 6.5.  There was also concern for possible cystitis, and she was placed on ciprofloxacin.  CT of the head had no acute abnormalities, and CT of the left hip showed soft tissue fullness and fluid in the deep left pelvis and presacral region.  Initial EKG showed sinus rhythm.  She was transfused 2 units packed red blood cells and admitted to [facility].  With transfusion, hemoglobin increased to 9.3.  She was confused during her stay.  She was taken for CT of the abdomen and pelvis on the morning of August 5 when she began to have seizure activity.  She had fluttering of her eyes and movement of her head along with dorsiflexion of both feet.  She was given Keppra and placed on twice daily Keppra.  MRI brain had no acute intracranial abnormality.     She underwent Neurology tele-consultation, and recommendations included additional Keppra along with Q 4 hour neuro checks and routine EEG.  She was lethargic and required repeated stimulation for her to participate during examination.  She was oriented to person and time.  She was able  to follow simple commands.  Mentation appears to be slowly improving.      She underwent noncontrasted CT of the abdomen and pelvis that showed a subcutaneous fluid collection overlying the left hip which may represent resolving hematoma or postsurgical seroma.  There were also findings suspicious for intramuscular hematoma in the left iliopsoas, psoas, and iliacus.  Transfer center spoke with Orthopedic Surgery at [facility].  At present, there does not appear to be surgical intervention indicated in the left hip.  Case also discussed with Interventional Radiology at [facility].  Monitor the trend in hemoglobin and hemodynamic status.  If she remains stable, continue monitoring.  If she has a significant drop in hemoglobin or show signs of hemodynamic instability, she may need CTA of the abdomen and pelvis (bleed protocol) to determine if IR intervention is indicated. Pt. Transferred toHospital Medicine at [facility] in step-down status for EEG and Neurology evaluation of new onset seizures as well as monitoring the trend in her hemoglobin.     August 5:  Sodium 135, potassium 3.2, chloride 104, CO2 23, BUN 12, creatinine 1.7, glucose 84, calcium 8.4, magnesium 1.4, phosphorus 3.2, AST 20, ALT, white blood cells 7.13, hemoglobin 9.3, hematocrit 27.9, platelets 242  -repeat CBC on the afternoon of August 5 had hemoglobin 10.7 and hematocrit 32.7 (improved from earlier).  -CT abdomen and pelvis without contrast noted small hypoattenuating nodule in the right hepatic lobe may represent a cyst.  Diverticulosis.  Recent right total left hip arthroplasty with poorly defined subcutaneous fluid collection overlying the left hip which may represent resolving hematoma or postsurgical seroma.  Postsurgical abscess not excludable.  Findings suspicious for intramuscular hematoma of the left iliopsoas, psoas, and iliacus muscles with potential active hemorrhage.  -MRI brain was motion limited.  No acute  intracranial abnormality.  Cortical atrophy with periventricular deep white matter change consistent with chronic small-vessel ischemic disease.     August 4: Blood cultures with no growth to date, INR 1.8, iron saturation 19%, stool for occult blood negative, lactic acid 0.7, hemoglobin 6.7, hematocrit 20.5, procalcitonin 0.26  -urinalysis with 2+ protein, trace blood, trace leukocytes, 4 RBC, 8 WBC, many bacteria  -chest x-ray had no acute cardiopulmonary process identified  -CT head had no hemorrhage or other acute intracranial CT findings.  -CT left hip showed nonspecific soft tissue fullness to lateral hip measuring 9.4 x 8.7 x 4.8 cm.  Partially imaged slightly hyperattenuating fluid in the left deep pelvis and presacral region which may represent blood products.  Postsurgical changes of left hip arthroplasty.  -EKG showed normal sinus rhythm and appeared to be fairly normal.    Overview/Hospital Course:  Patient maintained stable hemoglobin since admission to [facility].  Working well with speech therapy, tolerating bite size soft diet.  TSH noted to be markedly elevated up to 19, started on Synthroid    Interval History:   8/8: Mildly confused but redirectable.     Review of Systems   Constitutional:  Negative for activity change, appetite change, chills, fever and unexpected weight change.   HENT:  Negative for congestion and sore throat.    Respiratory:  Negative for cough and shortness of breath.    Cardiovascular:  Negative for chest pain, palpitations and leg swelling.   Gastrointestinal:  Negative for abdominal distention, abdominal pain, blood in stool, constipation, diarrhea, nausea and vomiting.   Genitourinary:  Negative for difficulty urinating, dysuria and hematuria.   Musculoskeletal:  Negative for arthralgias and myalgias.   Skin:  Negative for color change and rash.   Neurological:  Negative for dizziness, tremors and seizures.   Psychiatric/Behavioral:  Positive for confusion.       Objective:     Vital Signs (Most Recent):  Temp: 97.5 °F (36.4 °C) (08/08/24 0724)  Pulse: 80 (08/08/24 0724)  Resp: 20 (08/08/24 0724)  BP: (!) 173/90 (08/08/24 0724)  SpO2: (!) 93 % (08/08/24 0724) Vital Signs (24h Range):  Temp:  [97.5 °F (36.4 °C)-97.9 °F (36.6 °C)] 97.5 °F (36.4 °C)  Pulse:  [78-91] 80  Resp:  [16-20] 20  SpO2:  [93 %-96 %] 93 %  BP: (155-179)/(78-90) 173/90     Weight: 83.5 kg (184 lb 1.4 oz)  Body mass index is 28.83 kg/m².    Intake/Output Summary (Last 24 hours) at 8/8/2024 1556  Last data filed at 8/8/2024 0800  Gross per 24 hour   Intake --   Output 1000 ml   Net -1000 ml         Physical Exam  Vitals reviewed.   Constitutional:       General: She is not in acute distress.     Appearance: She is well-developed.   HENT:      Head: Normocephalic and atraumatic.      Comments: EEG in place.   Eyes:      Extraocular Movements: Extraocular movements intact.      Pupils: Pupils are equal, round, and reactive to light.   Neck:      Vascular: No JVD.      Trachea: No tracheal deviation.   Cardiovascular:      Rate and Rhythm: Normal rate and regular rhythm.      Heart sounds: No murmur heard.     No friction rub. No gallop.   Pulmonary:      Effort: No respiratory distress.      Breath sounds: Normal breath sounds. No wheezing or rales.   Abdominal:      General: Bowel sounds are normal. There is no distension.      Palpations: Abdomen is soft. There is no mass.      Tenderness: There is no abdominal tenderness.   Musculoskeletal:         General: No deformity.      Cervical back: Neck supple.   Lymphadenopathy:      Cervical: No cervical adenopathy.   Skin:     General: Skin is warm and dry.      Findings: No rash.   Neurological:      Mental Status: She is alert. She is disoriented.      Cranial Nerves: Cranial nerves 2-12 are intact.      Deep Tendon Reflexes:      Reflex Scores:       Tricep reflexes are 2+ on the right side and 2+ on the left side.       Bicep reflexes are 2+ on the  right side and 2+ on the left side.       Patellar reflexes are 2+ on the right side and 2+ on the left side.     Comments: Alert to person, place in hospital and year but not date/month/circumstances behind hospitalization, mildly confused   Psychiatric:         Speech: Speech is slurred.           Significant Labs: All pertinent labs within the past 24 hours have been reviewed.    Significant Imaging: I have reviewed all pertinent imaging results/findings within the past 24 hours.     Assessment/Plan:      * Seizure-like activity  -Reported seizure at [facility] prior to transfer pt. Was post-ictal and remains confused  -MRI brain uremarkable  -EEG so far unremarkable for any seizure activity   -currently on Keppra 500, follow up Neurology recommendations   -seizure precautions, fall precautions   -neuro checks      Hypertension  Pressures noted to be in the 180s systolic   Continue home Toprol   Follow up with pharmacy and family regarding home medication list    Dementia  Neuro checks   Fall precautions   Seizure precautions  Memantine   Follow up with pharmacy consultation and family input for home medication list    Hypothyroidism  Starting Synthroid 88 mcg   Following up with a pharmacy consult for home medication reconciliation      LUIS CARLOS (acute kidney injury)  -unclear if baseline, creatinine initially elevated at 2.0 now improving down to 1.4,   -stopping IV fluids    Encephalopathy, metabolic  -Pt. With intermittent confusion, seizure activity. Mentation seems to be improving with time after seizure   -do not suspect true UTI as cause given very low colony count  -MRI brain negative for acute findings.   -TSH markedly elevated, see below for further treatment, could have been easily contributing factor  -Delirium precautions ordered    Hip hematoma, left  -Pt. Presented anemia, required 1 unit pRBC, Hgb now stable ~10. CT abdomen conerning for subcutaneous fluid collection overlying the left hip may  "represent resolving hematoma or postsurgical seroma andFindings suspicious for an intramuscular hematoma of the left iliopsoas, psoas and iliacus muscles with potential active hemorrhage.  -Case discussed with IR and ortho prior to transfer, plan "Monitor the trend in hemoglobin and hemodynamic status. If she remains stable, continue monitoring. If she has a significant drop in hemoglobin or show signs of hemodynamic instability, she may need CTA of the abdomen and pelvis (bleed protocol) to determine if IR intervention is indicated"          Paroxysmal atrial fibrillation  -Hx of pAfib. EKG on admit showed NSR, rhythm currently regular  -Continue home metoprolol and monitor on telemetry.  Holding Xarelto due to bleeding concerns-this was affirmed by the daughter via phone    Acute cystitis with hematuria  -appears to be Enterococcus, unclear if truly infectious given very low colony count  -switching from Levaquin over to Macrobid        VTE Risk Mitigation (From admission, onward)           Ordered     Place sequential compression device  Until discontinued         08/06/24 1811                    Discharge Planning   KATI: 8/9/2024     Code Status: DNR   Is the patient medically ready for discharge?:     Reason for patient still in hospital (select all that apply): Patient trending condition  Discharge Plan A: Skilled Nursing Facility                  Traci Mancini MD  Department of Hospital Medicine   [facility] - Neurosurgery ([facility])    "

## 2024-08-09 NOTE — PT/OT/SLP PROGRESS
"Speech Language Pathology Treatment    Patient Name:  Shyae Clemente   MRN:  [id number]  952/952 A    Admitting Diagnosis: Seizure-like activity    Recommendations:                 General Recommendations:  diet follow up and ongoing Bmspep-Vcraevwv-Wwhhvtxcf therapy as appropriate  Diet recommendations:  Soft & Bite Sized Diet - IDDSI Level 6, Liquid Diet Level: Thin liquids - IDDSI Level 0   Aspiration Precautions:   1 small bite/sip at a time,   Alternating bites/sips,   Assistance with meals,   Feed only when awake/alert,   HOB to 90 degrees,   and Monitor for s/s of aspiration   General Precautions: Standard, fall  Communication strategies:   encourage increased volume, slow rate, and over articulation    Assessment:     Shaye Clemente is a 84 y.o. 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  She presents with waxing and waning mentation.    Subjective     Patient initially lethargic, however, improved LINDA as session continued.   SLP communicated with RN prior to entry. RN cleared SLP to administer po trials.     Patient states, "My throat feels like it's trying to be sore."    Respiratory Status: nasal cannula    Objective:     Has the patient been evaluated by SLP for swallowing?   Yes  Keep patient NPO? No     Patient making odd breathing sound throughout ST session where she blows her cheeks up with air and makes a sound that resembles a belch. She reports she does not know why she does this and that she just started doing this today. She states, "I don't know. It's like a tick." She later stated, "I wish I could stop doing that." Patient denies belching or coughing/choking.  Odd pattern was not noted by writing SLP or SLP who completed initial ST evaluation at this facility. Prior facility SLP noted "Patient exhibited mouth puffing phenomena during speech and swallowing tasks" suspected to be same noted pattern.     Patient seen for a swallowing assessment s/p possible " aspiration of emesis event per chart review. Patient also with UTI per chart review. ST will continue to monitor speech, language, and cognitive skills as UTI resolves to determine possible further ST needs. Patient accepted bites of apple sauce, diced peaches, and sesar cracker and sips of water via straw. Patient initially with increased confusion evident requiring max cues to suck water from straw (patient initially blowing into straw). Patient with improved LINDA as session continued. She presents with adequate oral clearance and no overt s/s of aspiration. SLP provided education on SLP recommendations, SLP role, s/s and risks of aspiration, safe swallow precautions, and POC. Patient would benefit from ongoing reinforcement.     Goals:   Multidisciplinary Problems       SLP Goals          Problem: SLP    Goal Priority Disciplines Outcome   SLP Goal     SLP Progressing   Description: Speech Language Pathology Goals  Goals expected to be met by 8/15/24  1. Pt will tolerate least restrictive diet without overt S/S aspiration, Supervision  2. Pt will participate in full assessment of speech, language and cognition to determine ongoing POC needs -met  3. Educate Pt and family on aspiration precautions and SLP POC  4. Pt will independently verbalize/demonstrate 3 speech strategies to increase intelligibility to 75% or greater in known and unknown contexts.   5. Pt will demonstrate adequate topic maintenance and/or attend to therapy tasks for 1+ minutes given no cues to redirect.   6. Pt will answer safety awareness/problem solving questions with 75% acc min cues.   7. Pt will follow 2+ step directions with 75% acc given 1 repetition.   8. Pt will complete functional sequencing tasks with 75% acc min cues.                        Plan:     Patient to be seen:  4 x/week   Plan of Care expires:  09/06/24  Plan of Care reviewed with:  patient   SLP Follow-Up:  Yes       Discharge recommendations:  Moderate Intensity Therapy    Barriers to Discharge:  None    Time Tracking:     SLP Treatment Date:   08/09/24  Speech Start Time:  0753  Speech Stop Time:  0814     Speech Total Time (min):  21 min    Billable Minutes: Treatment Swallowing Dysfunction 11 and Self Care/Home Management Training 10    08/09/2024

## 2024-08-09 NOTE — PLAN OF CARE
Patient pending ID consult.  SW sent records from [facility] and [facility] to ID per their request and additional request was sent to [facility] for more indepth records for related to infectious disease.      Patient accepted back at [facility] and they have received auth.  They do not accept on the weekend, so patient will return on Monday if medically stable.      Gladys Peterson, DEON  [facility]  [phone]

## 2024-08-09 NOTE — PLAN OF CARE
Problem: Adult Inpatient Plan of Care  Goal: Plan of Care Review  Outcome: Progressing  Flowsheets (Taken 8/9/2024 0530)  Plan of Care Reviewed With: patient  Goal: Patient-Specific Goal (Individualized)  Outcome: Progressing  Flowsheets (Taken 8/9/2024 0530)  Individualized Care Needs: keep patient updated  Anxieties, Fears or Concerns: quinten  Patient/Family-Specific Goals (Include Timeframe): keep family updated  Goal: Optimal Comfort and Wellbeing  Outcome: Progressing  Intervention: Monitor Pain and Promote Comfort  Flowsheets (Taken 8/9/2024 0530)  Pain Management Interventions:   relaxation techniques promoted   quiet environment facilitated   care clustered  Intervention: Provide Person-Centered Care  Flowsheets (Taken 8/9/2024 0530)  Trust Relationship/Rapport:   care explained   choices provided   empathic listening provided   questions answered   questions encouraged   emotional support provided   thoughts/feelings acknowledged   reassurance provided     Problem: Infection  Goal: Absence of Infection Signs and Symptoms  Outcome: Progressing  Intervention: Prevent or Manage Infection  Flowsheets (Taken 8/9/2024 0530)  Infection Management: aseptic technique maintained     Problem: Skin Injury Risk Increased  Goal: Skin Health and Integrity  Outcome: Progressing  Intervention: Optimize Skin Protection  Flowsheets (Taken 8/9/2024 0530)  Pressure Reduction Techniques: frequent weight shift encouraged  Skin Protection: incontinence pads utilized  Activity Management: Rolling - L1

## 2024-08-09 NOTE — SIGNIFICANT EVENT
Hospital Medicine  Plan of Care Note      Notified by RN of respiratory distress.   She reportedly had an episode of emesis, followed by respiratory distress with tachypnea and hypoxia requiring around 5L NC to maintain sats.    On my exam, she is at her mental status baseline compared to recent notes (oriented to person, place, not fully time or situation). She is mildly tachypneic but speaking in full sentences. She has audible wheezing notable standing next to her, and is again appreciated diffusely on auscultation. Sats improving at around 94-96% on 3L. No significant LE edema noted, but has signs of recent volume contraction in her skin. Patient herself has no complaints.     On chart review, sats have been lower 90s throughout the day on room air.     Highly suspect aspiration leading to respiratory distress and mild hypoxia.     Discussed plan with Johnston and bedside RN; will give Duoneb in case she has underlying lung disease exacerbated by aspiration and get VBG to assess for hypercapnia. However, if she has hypercapnia with acidosis, this would be a difficult situation as she is DNR; and since she has emesis and is likely aspirating, BiPAP would prove risky.     Will make NPO; change Keppra to IV for now, and will give a dose of CTX to cover her UTI as she is currently on Macrobid.    Alan Amado MD  Hospital Medicine         Critical Care Attestation:  I spent 32 minutes of critical care time assessing, diagnosing, and treating the patient's respiratory failure.

## 2024-08-09 NOTE — PROGRESS NOTES
[facility] - Neurosurgery ([facility])  [facility] Medicine  Progress Note    Patient Name: Shaye Clemente  MRN: [id number]  Patient Class: IP- Inpatient   Admission Date: 8/6/2024  Length of Stay: 3 days  Attending Physician: Traci Mancini MD  Primary Care Provider: Skip Singh MD        Subjective:     Principal Problem:Seizure-like activity        HPI:  84-year-old female with a left hip hemiarthroplasty in June (by report had hemiarthroplasty of closed comminuted femoral neck hip fracture), paroxysmal atrial fibrillation (on Xarelto), hypothyroidism, dementia, and hyperlipidemia admitted to [facility] on August 4 with low hemoglobin.  Patient reported tarry stools for several days, but Hemoccult was negative in the emergency department.  She also noted discomfort and swelling around her left hip.  Labs in the emergency department were concerning for anemia with hemoglobin 6.5.  There was also concern for possible cystitis, and she was placed on ciprofloxacin.  CT of the head had no acute abnormalities, and CT of the left hip showed soft tissue fullness and fluid in the deep left pelvis and presacral region.  Initial EKG showed sinus rhythm.  She was transfused 2 units packed red blood cells and admitted to [facility].  With transfusion, hemoglobin increased to 9.3.  She was confused during her stay.  She was taken for CT of the abdomen and pelvis on the morning of August 5 when she began to have seizure activity.  She had fluttering of her eyes and movement of her head along with dorsiflexion of both feet.  She was given Keppra and placed on twice daily Keppra.  MRI brain had no acute intracranial abnormality.     She underwent Neurology tele-consultation, and recommendations included additional Keppra along with Q 4 hour neuro checks and routine EEG.  She was lethargic and required repeated stimulation for her to participate during examination.  She was oriented to person and time.  She was able  to follow simple commands.  Mentation appears to be slowly improving.      She underwent noncontrasted CT of the abdomen and pelvis that showed a subcutaneous fluid collection overlying the left hip which may represent resolving hematoma or postsurgical seroma.  There were also findings suspicious for intramuscular hematoma in the left iliopsoas, psoas, and iliacus.  Transfer center spoke with Orthopedic Surgery at [facility].  At present, there does not appear to be surgical intervention indicated in the left hip.  Case also discussed with Interventional Radiology at [facility].  Monitor the trend in hemoglobin and hemodynamic status.  If she remains stable, continue monitoring.  If she has a significant drop in hemoglobin or show signs of hemodynamic instability, she may need CTA of the abdomen and pelvis (bleed protocol) to determine if IR intervention is indicated. Pt. Transferred toHospital Medicine at [facility] in step-down status for EEG and Neurology evaluation of new onset seizures as well as monitoring the trend in her hemoglobin.     August 5:  Sodium 135, potassium 3.2, chloride 104, CO2 23, BUN 12, creatinine 1.7, glucose 84, calcium 8.4, magnesium 1.4, phosphorus 3.2, AST 20, ALT, white blood cells 7.13, hemoglobin 9.3, hematocrit 27.9, platelets 242  -repeat CBC on the afternoon of August 5 had hemoglobin 10.7 and hematocrit 32.7 (improved from earlier).  -CT abdomen and pelvis without contrast noted small hypoattenuating nodule in the right hepatic lobe may represent a cyst.  Diverticulosis.  Recent right total left hip arthroplasty with poorly defined subcutaneous fluid collection overlying the left hip which may represent resolving hematoma or postsurgical seroma.  Postsurgical abscess not excludable.  Findings suspicious for intramuscular hematoma of the left iliopsoas, psoas, and iliacus muscles with potential active hemorrhage.  -MRI brain was motion limited.  No acute  intracranial abnormality.  Cortical atrophy with periventricular deep white matter change consistent with chronic small-vessel ischemic disease.     August 4: Blood cultures with no growth to date, INR 1.8, iron saturation 19%, stool for occult blood negative, lactic acid 0.7, hemoglobin 6.7, hematocrit 20.5, procalcitonin 0.26  -urinalysis with 2+ protein, trace blood, trace leukocytes, 4 RBC, 8 WBC, many bacteria  -chest x-ray had no acute cardiopulmonary process identified  -CT head had no hemorrhage or other acute intracranial CT findings.  -CT left hip showed nonspecific soft tissue fullness to lateral hip measuring 9.4 x 8.7 x 4.8 cm.  Partially imaged slightly hyperattenuating fluid in the left deep pelvis and presacral region which may represent blood products.  Postsurgical changes of left hip arthroplasty.  -EKG showed normal sinus rhythm and appeared to be fairly normal.    Overview/Hospital Course:  Patient maintained stable hemoglobin since admission to [facility].  Working well with speech therapy, tolerating bite size soft diet.  TSH noted to be markedly elevated up to 19, started on Synthroid    Interval History:   8/9: aspiration event overnight.     Review of Systems   Constitutional:  Negative for activity change, appetite change, chills, fever and unexpected weight change.   HENT:  Negative for congestion and sore throat.    Respiratory:  Negative for cough and shortness of breath.    Cardiovascular:  Negative for chest pain, palpitations and leg swelling.   Gastrointestinal:  Negative for abdominal distention, abdominal pain, blood in stool, constipation, diarrhea, nausea and vomiting.   Genitourinary:  Negative for difficulty urinating, dysuria and hematuria.   Musculoskeletal:  Negative for arthralgias and myalgias.   Skin:  Negative for color change and rash.   Neurological:  Negative for dizziness, tremors and seizures.   Psychiatric/Behavioral:  Positive for confusion.      Objective:      Vital Signs (Most Recent):  Temp: 98.3 °F (36.8 °C) (08/09/24 1139)  Pulse: 81 (08/09/24 1139)  Resp: 18 (08/09/24 1139)  BP: (!) 192/84 (08/09/24 1139)  SpO2: 99 % (08/09/24 1139) Vital Signs (24h Range):  Temp:  [97.6 °F (36.4 °C)-98.3 °F (36.8 °C)] 98.3 °F (36.8 °C)  Pulse:  [77-94] 81  Resp:  [18-34] 18  SpO2:  [84 %-99 %] 99 %  BP: (134-204)/(70-94) 192/84     Weight: 83.5 kg (184 lb 1.4 oz)  Body mass index is 28.83 kg/m².    Intake/Output Summary (Last 24 hours) at 8/9/2024 1427  Last data filed at 8/9/2024 0920  Gross per 24 hour   Intake 1916.05 ml   Output 700 ml   Net 1216.05 ml         Physical Exam  Vitals reviewed.   Constitutional:       General: She is not in acute distress.     Appearance: She is well-developed.   HENT:      Head: Normocephalic and atraumatic.      Comments: EEG in place.   Eyes:      Extraocular Movements: Extraocular movements intact.      Pupils: Pupils are equal, round, and reactive to light.   Neck:      Vascular: No JVD.      Trachea: No tracheal deviation.   Cardiovascular:      Rate and Rhythm: Normal rate and regular rhythm.      Heart sounds: No murmur heard.     No friction rub. No gallop.   Pulmonary:      Effort: No respiratory distress.      Breath sounds: Normal breath sounds. No wheezing or rales.   Abdominal:      General: Bowel sounds are normal. There is no distension.      Palpations: Abdomen is soft. There is no mass.      Tenderness: There is no abdominal tenderness.   Musculoskeletal:         General: No deformity.      Cervical back: Neck supple.   Lymphadenopathy:      Cervical: No cervical adenopathy.   Skin:     General: Skin is warm and dry.      Findings: No rash.   Neurological:      Mental Status: She is alert. She is disoriented.      Cranial Nerves: Cranial nerves 2-12 are intact.      Deep Tendon Reflexes:      Reflex Scores:       Tricep reflexes are 2+ on the right side and 2+ on the left side.       Bicep reflexes are 2+ on the right side and  2+ on the left side.       Patellar reflexes are 2+ on the right side and 2+ on the left side.     Comments: Alert to person, place in hospital and year but not date/month/circumstances behind hospitalization, mildly confused   Psychiatric:         Speech: Speech is slurred.             Significant Labs: All pertinent labs within the past 24 hours have been reviewed.    Significant Imaging: I have reviewed all pertinent imaging results/findings within the past 24 hours.    Assessment/Plan:      * Seizure-like activity  -Reported seizure at [facility] prior to transfer pt. Was post-ictal and remains confused  -MRI brain uremarkable  -EEG so far unremarkable for any seizure activity   -currently on Keppra 500, follow up Neurology recommendations   -seizure precautions, fall precautions   -neuro checks      Hypertension  Pressures noted to be in the 180s systolic   Continue home Toprol   Follow up with pharmacy and family regarding home medication list    Dementia  Neuro checks   Fall precautions   Seizure precautions  Memantine   Follow up with pharmacy consultation and family input for home medication list    Hypothyroidism  Starting Synthroid 88 mcg   Following up with a pharmacy consult for home medication reconciliation      LUIS CARLOS (acute kidney injury)  -unclear if baseline, creatinine initially elevated at 2.0 now improving down to 1.4,   -stopping IV fluids    Encephalopathy, metabolic  -Pt. With intermittent confusion, seizure activity. Mentation seems to be improving with time after seizure   -do not suspect true UTI as cause given very low colony count  -MRI brain negative for acute findings.   -TSH markedly elevated, see below for further treatment, could have been easily contributing factor  -Delirium precautions ordered    Hip hematoma, left  -Pt. Presented anemia, required 1 unit pRBC, Hgb now stable ~10. CT abdomen conerning for subcutaneous fluid collection overlying the left hip may represent  "resolving hematoma or postsurgical seroma andFindings suspicious for an intramuscular hematoma of the left iliopsoas, psoas and iliacus muscles with potential active hemorrhage.  -Case discussed with IR and ortho prior to transfer, plan "Monitor the trend in hemoglobin and hemodynamic status. If she remains stable, continue monitoring. If she has a significant drop in hemoglobin or show signs of hemodynamic instability, she may need CTA of the abdomen and pelvis (bleed protocol) to determine if IR intervention is indicated"          Paroxysmal atrial fibrillation  -Hx of pAfib. EKG on admit showed NSR, rhythm currently regular  -Continue home metoprolol and monitor on telemetry.  Holding Xarelto due to bleeding concerns-this was affirmed by the daughter via phone    Acute cystitis with hematuria  -appears to be Enterococcus, unclear if truly infectious given very low colony count  -switching from Levaquin over to Macrobid        VTE Risk Mitigation (From admission, onward)           Ordered     Place sequential compression device  Until discontinued         08/06/24 1811                    Discharge Planning   KATI: 8/9/2024     Code Status: DNR   Is the patient medically ready for discharge?:     Reason for patient still in hospital (select all that apply): Patient trending condition  Discharge Plan A: Skilled Nursing Facility                  Traci Mancini MD  Department of Hospital Medicine   [facility] - Neurosurgery ([facility])    "

## 2024-08-09 NOTE — NURSING
RN bedside for shift report at 1910, patient noted to be grunting and tachypnea laying down in bed. Day shift RN stated that patient had been making 'pig like' grunting sounds through out the shift. Patient was sat up, patients continued to have grunting. Patient looked like she was holding something back RN asked patient if she had to puke and told patient to don't try to stop it puke it out. Patient threw up and appeared to have a aspiration issue. O2 level on RA was 84%, suction set up and O2 tubing set and and pt put on O2. Charge RN notified and came bedside. Dr. Amado, Rapid Response and RT informed of patients condition and came bedside. Patient was A&Ox 4 during this time but would fall asleep during conversation.and patients conversation did not make sense at times, patients speech was slurred. New orders received.

## 2024-08-09 NOTE — HPI
"Ms. Clemente is a 83 yo female with PMH of left femoral neck fracture, s/p left bipolar hemiarthroplasty on 6/4/24, paroxysmal atrial fibrillation (on Xarelto), hypothyroidism, dementia, and hyperlipidemia admitted to [facility] (from [facility]) on August 4 with low hemoglobin. While at OSH, work up was nonrevealing for source of anemia, pt had reported tarry stools, though stool hemoccult was negative. Pt reported swelling around her left hip, and CT left hip noted soft tissue swelling/fluid in deep left pelvis, presacral region. Pt was also treated empirically for cysitits with cipro. During a CT of her AP, pt had a witnessed seizure that required keppra. Neurology was consulted and MRI brain and EEG without acute findings. While at OSH, ortho surgery and interventional radiology was consulted at [facility] and surgical intervention was not planned, though recommended to continue close monitoring of H/H and if continues to drop recommending CTA of abdomen/pelvis to see if IR intervention is indicated. Ultimately, pt was transferred to [facility] for EEG, neurology eval 2/2 new onset seizures, as well as close monitoring in H/H.     Prior to admission to [facility], pt was sent to [facility] as there was concerns for left hip pain, redness, and swelling. Per limited records available, appears pt was bacteremic with proteus vulgaris on 6/24/24, also with Ecoli ESBL uti. It appears Dr. Dave Nascimento was following pt. Per ED notes on 7/25/24 from [facility], pt was noted that "pt underwent left hip debridement for post op left hip infection on July 1st, she had proteus in her tissues, culture later was positive for pseudomonas, staph hemolyticus, and enterococcus. Pt was only on rocephin, though it was planned to continue cefepime,  however it was discontinued and pt was started on rocephin alone."     ID was consulted d/t "Wound infection. Was on ertapenem at nursing home. Should this be continued"        "

## 2024-08-09 NOTE — SUBJECTIVE & OBJECTIVE
Interval History:   8/9: aspiration event overnight.     Review of Systems   Constitutional:  Negative for activity change, appetite change, chills, fever and unexpected weight change.   HENT:  Negative for congestion and sore throat.    Respiratory:  Negative for cough and shortness of breath.    Cardiovascular:  Negative for chest pain, palpitations and leg swelling.   Gastrointestinal:  Negative for abdominal distention, abdominal pain, blood in stool, constipation, diarrhea, nausea and vomiting.   Genitourinary:  Negative for difficulty urinating, dysuria and hematuria.   Musculoskeletal:  Negative for arthralgias and myalgias.   Skin:  Negative for color change and rash.   Neurological:  Negative for dizziness, tremors and seizures.   Psychiatric/Behavioral:  Positive for confusion.      Objective:     Vital Signs (Most Recent):  Temp: 98.3 °F (36.8 °C) (08/09/24 1139)  Pulse: 81 (08/09/24 1139)  Resp: 18 (08/09/24 1139)  BP: (!) 192/84 (08/09/24 1139)  SpO2: 99 % (08/09/24 1139) Vital Signs (24h Range):  Temp:  [97.6 °F (36.4 °C)-98.3 °F (36.8 °C)] 98.3 °F (36.8 °C)  Pulse:  [77-94] 81  Resp:  [18-34] 18  SpO2:  [84 %-99 %] 99 %  BP: (134-204)/(70-94) 192/84     Weight: 83.5 kg (184 lb 1.4 oz)  Body mass index is 28.83 kg/m².    Intake/Output Summary (Last 24 hours) at 8/9/2024 1427  Last data filed at 8/9/2024 0920  Gross per 24 hour   Intake 1916.05 ml   Output 700 ml   Net 1216.05 ml         Physical Exam  Vitals reviewed.   Constitutional:       General: She is not in acute distress.     Appearance: She is well-developed.   HENT:      Head: Normocephalic and atraumatic.      Comments: EEG in place.   Eyes:      Extraocular Movements: Extraocular movements intact.      Pupils: Pupils are equal, round, and reactive to light.   Neck:      Vascular: No JVD.      Trachea: No tracheal deviation.   Cardiovascular:      Rate and Rhythm: Normal rate and regular rhythm.      Heart sounds: No murmur heard.     No  friction rub. No gallop.   Pulmonary:      Effort: No respiratory distress.      Breath sounds: Normal breath sounds. No wheezing or rales.   Abdominal:      General: Bowel sounds are normal. There is no distension.      Palpations: Abdomen is soft. There is no mass.      Tenderness: There is no abdominal tenderness.   Musculoskeletal:         General: No deformity.      Cervical back: Neck supple.   Lymphadenopathy:      Cervical: No cervical adenopathy.   Skin:     General: Skin is warm and dry.      Findings: No rash.   Neurological:      Mental Status: She is alert. She is disoriented.      Cranial Nerves: Cranial nerves 2-12 are intact.      Deep Tendon Reflexes:      Reflex Scores:       Tricep reflexes are 2+ on the right side and 2+ on the left side.       Bicep reflexes are 2+ on the right side and 2+ on the left side.       Patellar reflexes are 2+ on the right side and 2+ on the left side.     Comments: Alert to person, place in hospital and year but not date/month/circumstances behind hospitalization, mildly confused   Psychiatric:         Speech: Speech is slurred.             Significant Labs: All pertinent labs within the past 24 hours have been reviewed.    Significant Imaging: I have reviewed all pertinent imaging results/findings within the past 24 hours.

## 2024-08-09 NOTE — CARE UPDATE
RAPID RESPONSE NURSE PROACTIVE ROUNDING NOTE       Time of Visit:     Admit Date: 2024  LOS: 2  Code Status: DNR   Date of Visit: 2024  : 1940  Age: 84 y.o.  Sex: female  Race: White  Bed: [address] [place] [address] A:   MRN: [id number]  Was the patient discharged from an ICU this admission? No   Was the patient discharged from a PACU within last 24 hours? No   Did the patient receive conscious sedation/general anesthesia in last 24 hours? No  Was the patient in the ED within the past 24 hours? No  Was the patient on NIPPV within the past 24 hours? No   Attending Physician: Traci Mancini MD  Primary Service: [facility] MED N   Time spent at the bedside: 15 -30 min    SITUATION    Notified by charge RN via phone call.  Reason for alert: increased work of breathing, concern for aspiration  Called to evaluate the patient for Respiratory    BACKGROUND     Why is the patient in the hospital?: Seizure-like activity    Patient has a past medical history of Mumps without complication and Paroxysmal atrial fibrillation.    Last Vitals:  Temp: 98.1 °F (36.7 °C) (1945)  Pulse: 87 (2011)  Resp: 22 (2011)  BP: 142/94 (2011)  SpO2: 95 % (2011)    24 Hours Vitals Range:  Temp:  [97.5 °F (36.4 °C)-98.1 °F (36.7 °C)]   Pulse:  [78-88]   Resp:  [16-34]   BP: (142-204)/(74-94)   SpO2:  [84 %-96 %]     Labs:  Recent Labs     24  0624  0305 24   WBC 9.15 8.58 7.80  --    HGB 9.9* 10.4* 10.1*  --    HCT 30.0* 32.4* 32.1* 29*    272 230  --        Recent Labs     24  0544 24  0630 24  0305   * 136 134*   K 3.6 3.2* 3.2*    106 104   CO2 21* 23 22*   BUN 16 15 15   CREATININE 1.6* 1.4 1.3   GLU 83 104 121*   PHOS 3.0 2.0* 2.6*   MG 1.8 1.7 2.1        Recent Labs     24   PH 7.437   PCO2 33.0*   PO2 29*   HCO3 22.3*   POCSATURATED 59   BE -2        ASSESSMENT      Physical Exam  Constitutional:       Appearance: She is  ill-appearing.   Cardiovascular:      Rate and Rhythm: Normal rate.      Pulses: Normal pulses.   Pulmonary:      Effort: Tachypnea present.      Breath sounds: Decreased breath sounds and wheezing present.   Musculoskeletal:      Right lower leg: Edema present.      Left lower leg: Edema present.   Skin:     General: Skin is warm and dry.      Capillary Refill: Capillary refill takes less than 2 seconds.   Neurological:      Mental Status: She is alert. Mental status is at baseline. She is disoriented.     HR 88  /74 (107)  RR 34, SpO2 96% on 3L NC    Patient in bed, awake and alert. Audible wheezing, mostly upper chest/upper airway with auscultation. Tachypneic at rest. Trace edema noted to BLE. Pt with witnessed emesis event ~15 min earlier with some concern for aspiration. Currently denying any nausea    INTERVENTIONS    The patient was seen for Respiratory problem. Staff concerns included tachypnea, new onset of difficulty breathing, increased WOB, and increased oxygen requirements. The following interventions were performed: supplemental oxygen, portable chest x-ray, albuterol-ipratropium (DUO-NEB) 0.5-3 mg/ 3 ml nebulizer for wheezing, continuous cardiac monitoring continued, and POCT venous blood gas.    Pt given duoneb with notable improvement in WOB and wheezing    Will keep NPO tonight, MD to change essential medications to IV    VBG without acute abnormalities    Pt placed on supplemental O2 at 3L, sats 93-96%    RECOMMENDATIONS    Maintain strict aspiration precautions  Follow up with imaging  Titrate oxygen as necessary to maintain SpO2 92% and above  If WOB worsens, pt may benefit from small dose IVP lasix  Administer duoneb as clincally indicated    PROVIDER ESCALATION    Yes/No  Yes    Orders received and case discussed with Dr. Amado .    Disposition: Remain in room 952.    FOLLOW-UP    Bedside RNTraci, charge RN Jg  updated on plan of care. Instructed to call the Rapid Response  NurseMARLYS RN at [phone] for additional questions or concerns.

## 2024-08-09 NOTE — CARE UPDATE
I have reviewed the chart of Shaye Clemente who is hospitalized for the following:    Active Hospital Problems    Diagnosis    *Seizure-like activity    Acute hypoxemic respiratory failure     had an episode of emesis, followed by respiratory distress with tachypnea and hypoxia requiring around 5L NC to maintain sats.       Chronic anticoagulation     On xarelto      Hypophosphatemia     Monitor with daily labs  replace      Hemorrhagic disorder due to circulating anticoagulants     Patient hip hematoma, was on xarelto  Evaluate by ortho no acute intervention       Hyponatremia     Na 135  Monitor with daily cmp  improving      Hypokalemia     K 3.2 POA  Monitor with daily cmp  replace      Hypothyroidism     On synthroid       Dementia    Hypertension    Hip hematoma, left    Encephalopathy, metabolic    LUIS CARLOS (acute kidney injury)    Paroxysmal atrial fibrillation    Acute cystitis with hematuria        Mikala Brar NP  Unit Based [facility]

## 2024-08-09 NOTE — SUBJECTIVE & OBJECTIVE
Past Medical History:   Diagnosis Date    Mumps without complication     Paroxysmal atrial fibrillation        Past Surgical History:   Procedure Laterality Date    BREAST SURGERY Right     partial    HEMIARTHROPLASTY, HIP, POSTERIOR APPROACH Left 06/04/2024    HYSTERECTOMY      TONSILLECTOMY      VAGINECTOMY         Review of patient's allergies indicates:   Allergen Reactions    Prilosec [omeprazole]     Augmentin [amoxicillin-pot clavulanate] Nausea And Vomiting    Codeine Rash    Sulfa (sulfonamide antibiotics) Rash       Medications:  Medications Prior to Admission   Medication Sig    0.9% NaCl PgBk 100 mL with ertapenem 1 gram SolR 1 g Inject 1 g into the vein once daily.    albuterol-ipratropium (DUO-NEB) 2.5 mg-0.5 mg/3 mL nebulizer solution Take 3 mLs by nebulization every 3 (three) hours as needed for Wheezing. Rescue    amino acids-protein hydrolys (PRO-STAT AWC)  gram-kcal/30 mL Liqd Take 30 mLs by mouth 2 (two) times a day. For wound healing.    betamethasone valerate 0.1% (VALISONE) 0.1 % Crea Apply 0.1 % topically 2 (two) times daily. Apply to posterior trunk/back    CALCIUM CARBONATE ORAL Take 1 tablet by mouth 2 (two) times daily as needed for Heartburn.    diphenhydrAMINE (BENADRYL) 25 mg capsule Take 25 mg by mouth every 6 (six) hours as needed for Itching.    docusate sodium (COLACE) 100 MG capsule Take 100 mg by mouth 2 (two) times daily.    DULoxetine (CYMBALTA) 30 MG capsule Take 30 mg by mouth 2 (two) times daily.    ferrous sulfate (FEOSOL) 325 mg (65 mg iron) Tab tablet Take 325 mg by mouth daily with breakfast.    HYDROcodone-acetaminophen (NORCO) 5-325 mg per tablet Take 1 tablet by mouth every 6 (six) hours as needed for Pain.    ibandronate (BONIVA) 150 mg tablet Take 150 mg by mouth every 30 days.    levothyroxine (SYNTHROID) 25 MCG tablet Take 25 mcg by mouth before breakfast.    memantine (NAMENDA) 10 MG Tab Take 10 mg by mouth 2 (two) times daily.    metoprolol succinate 50 mg  CSpX Take 50 mg by mouth once daily.    montelukast (SINGULAIR) 10 mg tablet Take 10 mg by mouth every evening.    polyethylene glycol (GLYCOLAX) 17 gram/dose powder Take 17 g by mouth once daily.    rivaroxaban (XARELTO) 20 mg Tab Take 20 mg by mouth daily with dinner or evening meal.    rosuvastatin (CRESTOR) 10 MG tablet Take 10 mg by mouth once daily.    Saccharomyces boulardii (FLORASTOR) 250 mg capsule Take 250 mg by mouth 2 (two) times daily.    tiotropium-olodateroL (STIOLTO RESPIMAT) 2.5-2.5 mcg/actuation Mist Inhale 2 puffs into the lungs Daily. Controller     Antibiotics (From admission, onward)      Start     Stop Route Frequency Ordered    08/09/24 1000  ertapenem (INVANZ) 1 g in 0.9% NaCl 100 mL IVPB (MB+)         -- IV Every 24 hours (non-standard times) 08/09/24 0913    08/07/24 1630  nitrofurantoin (macrocrystal-monohydrate) 100 MG capsule 100 mg         -- Oral Every 12 hours 08/07/24 1627    08/06/24 2100  mupirocin 2 % ointment         08/10/24 2059 Nasl 2 times daily 08/06/24 1811          Antifungals (From admission, onward)      None          Antivirals (From admission, onward)      None               There is no immunization history on file for this patient.    Family History    None       Social History     Socioeconomic History    Marital status:    Tobacco Use    Smoking status: Never    Smokeless tobacco: Never     Social Determinants of Health     Financial Resource Strain: Patient Unable To Answer (8/8/2024)    Overall Financial Resource Strain (CARDIA)     Difficulty of Paying Living Expenses: Patient unable to answer   Food Insecurity: Patient Unable To Answer (8/8/2024)    Hunger Vital Sign     Worried About Running Out of Food in the Last Year: Patient unable to answer     Ran Out of Food in the Last Year: Patient unable to answer   Transportation Needs: Patient Unable To Answer (8/8/2024)    TRANSPORTATION NEEDS     Transportation : Patient unable to answer   Stress: Patient  Unable To Answer (8/8/2024)    [employer] [place] [employer] - Occupational Stress Questionnaire     Feeling of Stress : Patient unable to answer   Housing Stability: Patient Unable To Answer (8/8/2024)    Housing Stability Vital Sign     Unable to Pay for Housing in the Last Year: Patient unable to answer     Homeless in the Last Year: Patient unable to answer     Review of Systems   Constitutional:  Negative for chills, diaphoresis, fatigue and fever.   HENT: Negative.     Respiratory:  Negative for cough and shortness of breath.    Cardiovascular:  Negative for leg swelling.   Gastrointestinal:  Positive for diarrhea. Negative for blood in stool, nausea and vomiting.   Genitourinary:  Negative for difficulty urinating, dysuria, flank pain and pelvic pain.   Skin:  Negative for rash and wound.   Psychiatric/Behavioral:  Positive for confusion (though oriented). Negative for agitation.      Objective:     Vital Signs (Most Recent):  Temp: 98.2 °F (36.8 °C) (08/09/24 1549)  Pulse: 84 (08/09/24 1549)  Resp: 18 (08/09/24 1549)  BP: (!) 178/83 (08/09/24 1549)  SpO2: 97 % (08/09/24 1549) Vital Signs (24h Range):  Temp:  [97.6 °F (36.4 °C)-98.3 °F (36.8 °C)] 98.2 °F (36.8 °C)  Pulse:  [77-94] 84  Resp:  [18-34] 18  SpO2:  [84 %-99 %] 97 %  BP: (134-204)/(70-94) 178/83     Weight: 83.5 kg (184 lb 1.4 oz)  Body mass index is 28.83 kg/m².    Estimated Creatinine Clearance: 36.4 mL/min (based on SCr of 1.3 mg/dL).     Physical Exam  Vitals reviewed.   Constitutional:       General: She is not in acute distress.     Appearance: Normal appearance. She is not ill-appearing.   HENT:      Head: Normocephalic.      Nose: Nose normal.      Mouth/Throat:      Mouth: Mucous membranes are moist.      Pharynx: Oropharynx is clear.   Eyes:      General:         Right eye: No discharge.         Left eye: No discharge.      Conjunctiva/sclera: Conjunctivae normal.   Cardiovascular:      Rate and Rhythm: Normal rate and regular  rhythm.      Pulses: Normal pulses.      Heart sounds: Normal heart sounds. No murmur heard.  Pulmonary:      Effort: Pulmonary effort is normal. No respiratory distress.      Breath sounds: Normal breath sounds. No stridor.   Abdominal:      General: Abdomen is flat. There is no distension.      Palpations: Abdomen is soft.      Tenderness: There is no abdominal tenderness. There is no right CVA tenderness or left CVA tenderness.   Musculoskeletal:         General: Normal range of motion.      Cervical back: Normal range of motion.      Right lower leg: No edema.      Left lower leg: No edema.   Skin:     Findings: No lesion or rash.      Comments: With healed left hip incision, without surrounding swelling, redness. Without tenderness to palpation. Without drainage or underlying fluctuance.    Neurological:      Mental Status: She is alert and oriented to person, place, and time.      Comments: Mumbling speech, with intermittent confusion, but overall, alert and oriented x4    Psychiatric:         Mood and Affect: Mood normal.          Significant Labs: All pertinent labs within the past 24 hours have been reviewed.    Significant Imaging: I have reviewed all pertinent imaging results/findings within the past 24 hours.

## 2024-08-09 NOTE — CONSULTS
Yossi Vargas - Neurosurgery ([facility])  Infectious Disease  Consult Note    Patient Name: Shaye Clemente  MRN: [id number]  Admission Date: 8/6/2024  Hospital Length of Stay: 3 days  Attending Physician: Traci Mancini MD  Primary Care Provider: Skip Singh MD     Isolation Status: No active isolations    Patient information was obtained from patient, relative(s), past medical records, and ER records.      Inpatient consult to Infectious Diseases  Consult performed by: Daniel Deras NP  Consult ordered by: Traci Mancini MD        Assessment/Plan:     ID  Post op infection  85 yo female with PMH of left femoral neck fracture, s/p left bipolar hemiarthroplasty on 6/4/24, paroxysmal atrial fibrillation (on Xarelt), hypothyroidism, dementia, and hyperlipidemia admitted to [facility] (from [facility]) on August 4 with low hemoglobin. See HPI.     Ultimately transferred to [facility] as pt had witnessed seizure like activity during CT at OSH, was transferred for neuro services. Pt was seen via tele consult per neuro, who noted that MRI brain and EKG without acute findings. Recommended EEG with potential continuous EEGs if seizures persist.  Recommended delirium precautions as well. EEG was completed and without seizure activity though showed slowing background, suggesting encephalopathic process. Neurology suspecting seizure could have been provoked per UTI or infectious process, and pt was started on maintenance keppra x6 months.     Pt with recent hx of left hip repair following fracture in 6/4/24. This was complicated by post op infection. Records are very limited at this time, though from what I am able to collect from information available to me is that pt had an episode of proteus vulgaris bacteremia on 6/24/24 as well as ecoli ESBL UTI on 6/25/24. She required transfer from her PT facility back to [facility] [place] as her left hip was also red, swollen, tender and there was cf post op  "infection. Following transfer, pt underwent debridement of hip on 7/1/24 in which limited records indicate proteus, pseudomonas, staph hemolyticus, and enterococcus were isolated. Specific culture data is not available to me at this time to confirm this. Per record, it seems as pt was on ceftriaxone alone, and it is unclear when ertapenem was started.     After speaking with family, it was stated ertapenem was started d/t ESBL ecoli uti and it was going to continue for 6 weeks, ending in a week or so.      Pt pleasant at bedside. Continues to mumble and have intermittent confusion, though remains oriented to place, situation, person. Currently afebrile. HDS. Admit blood cultures negative. With + urine culture from OSH that is e facium. Pt denies urinary symptoms at this time. Imaging significant for CT AP noting poorly defined subq fluid collection overlying left hip which may reflect resolving hematoma or post surgical seroma. Read notes, unable to exclude post surgical abscess. Also noting that findings are suspicious for intramuscular hematoma of left iliopsoas, psoas, and iliacus mm with potential active hemorrhage.     Pt currently on ertapenem and macrobid. ID was consulted as "Wound infection. Was on ertapenem at nursing home. Should this be continued"    Recommendations:  - dc macrobid as likely not covering VRE on urine culture. Pt asymptomatic for UTI at this time. Consider repeating UA/culture if concerns arise.   - CT AP with cf subq fluid collection over left hip, as well as intramuscular hematoma of left hip mm. Consider ortho surgery and/or IR consult for joint aspiration to further guide antibiotic therapy .   - Suspect she was on ertapenem for post op wound infection following hip repair, though unclear. Ertapenem does not cover all of the bacteria (pseudomonas) that was noted following hip debridement, therefore recommend starting meropenem 2g IV q12hr for full coverage of documented organisms. " Requesting culture information from [facility], will deescalate when able.   - carbapenems, as all beta lactams, have low risk lowering seizure threshold (<1%), so recommend very close monitoring.  -  will need further records in order to give proper recommendation, specifically [facility] records including infectious disease and ortho surgery notes, as well as culture reports with sensitivities. Discussed with case mgmt as well as primary team   - discussed at length with pt's daughter, including risks/benefits of continuing antibiotic therapy. Verbalized understanding.   - plan reviewed with ID staff, Dr. Patel, as well as ID pharmacist. ID will follow.         Thank you for your consult. I will follow-up with patient. Please contact us if you have any additional questions.    Daniel Suazo NP  Infectious Disease  [facility] - Neurosurgery ([facility])    Subjective:     Principal Problem: Seizure-like activity    HPI: Ms. Clemente is a 85 yo female with PMH of left femoral neck fracture, s/p left bipolar hemiarthroplasty on 6/4/24, paroxysmal atrial fibrillation (on Xarelto), hypothyroidism, dementia, and hyperlipidemia admitted to [facility] (from [facility]) on August 4 with low hemoglobin. While at OSH, work up was nonrevealing for source of anemia, pt had reported tarry stools, though stool hemoccult was negative. Pt reported swelling around her left hip, and CT left hip noted soft tissue swelling/fluid in deep left pelvis, presacral region. Pt was also treated empirically for cysitits with cipro. During a CT of her AP, pt had a witnessed seizure that required keppra. Neurology was consulted and MRI brain and EEG without acute findings. While at OSH, ortho surgery and interventional radiology was consulted at [facility] and surgical intervention was not planned, though recommended to continue close monitoring of H/H and if continues to drop recommending CTA of abdomen/pelvis to see if IR  "intervention is indicated. Ultimately, pt was transferred to [facility] for EEG, neurology eval 2/2 new onset seizures, as well as close monitoring in H/H.     Prior to admission to [facility], pt was sent to [facility] as there was concerns for left hip pain, redness, and swelling. Per limited records available, appears pt was bacteremic with proteus vulgaris on 6/24/24, also with Ecoli ESBL uti. It appears Dr. Dave Nascimento was following pt. Per ED notes on 7/25/24 from [facility], pt was noted that "pt underwent left hip debridement for post op left hip infection on July 1st, she had proteus in her tissues, culture later was positive for pseudomonas, staph hemolyticus, and enterococcus. Pt was only on rocephin, though it was planned to continue cefepime,  however it was discontinued and pt was started on rocephin alone."     ID was consulted d/t "Wound infection. Was on ertapenem at nursing home. Should this be continued"          Past Medical History:   Diagnosis Date    Mumps without complication     Paroxysmal atrial fibrillation        Past Surgical History:   Procedure Laterality Date    BREAST SURGERY Right     partial    HEMIARTHROPLASTY, HIP, POSTERIOR APPROACH Left 06/04/2024    HYSTERECTOMY      TONSILLECTOMY      VAGINECTOMY         Review of patient's allergies indicates:   Allergen Reactions    Prilosec [omeprazole]     Augmentin [amoxicillin-pot clavulanate] Nausea And Vomiting    Codeine Rash    Sulfa (sulfonamide antibiotics) Rash       Medications:  Medications Prior to Admission   Medication Sig    0.9% NaCl PgBk 100 mL with ertapenem 1 gram SolR 1 g Inject 1 g into the vein once daily.    albuterol-ipratropium (DUO-NEB) 2.5 mg-0.5 mg/3 mL nebulizer solution Take 3 mLs by nebulization every 3 (three) hours as needed for Wheezing. Rescue    amino acids-protein hydrolys (PRO-STAT AW)  gram-kcal/30 mL Liqd Take 30 mLs by mouth 2 (two) times a day. For wound healing.    betamethasone " valerate 0.1% (VALISONE) 0.1 % Crea Apply 0.1 % topically 2 (two) times daily. Apply to posterior trunk/back    CALCIUM CARBONATE ORAL Take 1 tablet by mouth 2 (two) times daily as needed for Heartburn.    diphenhydrAMINE (BENADRYL) 25 mg capsule Take 25 mg by mouth every 6 (six) hours as needed for Itching.    docusate sodium (COLACE) 100 MG capsule Take 100 mg by mouth 2 (two) times daily.    DULoxetine (CYMBALTA) 30 MG capsule Take 30 mg by mouth 2 (two) times daily.    ferrous sulfate (FEOSOL) 325 mg (65 mg iron) Tab tablet Take 325 mg by mouth daily with breakfast.    HYDROcodone-acetaminophen (NORCO) 5-325 mg per tablet Take 1 tablet by mouth every 6 (six) hours as needed for Pain.    ibandronate (BONIVA) 150 mg tablet Take 150 mg by mouth every 30 days.    levothyroxine (SYNTHROID) 25 MCG tablet Take 25 mcg by mouth before breakfast.    memantine (NAMENDA) 10 MG Tab Take 10 mg by mouth 2 (two) times daily.    metoprolol succinate 50 mg CSpX Take 50 mg by mouth once daily.    montelukast (SINGULAIR) 10 mg tablet Take 10 mg by mouth every evening.    polyethylene glycol (GLYCOLAX) 17 gram/dose powder Take 17 g by mouth once daily.    rivaroxaban (XARELTO) 20 mg Tab Take 20 mg by mouth daily with dinner or evening meal.    rosuvastatin (CRESTOR) 10 MG tablet Take 10 mg by mouth once daily.    Saccharomyces boulardii (FLORASTOR) 250 mg capsule Take 250 mg by mouth 2 (two) times daily.    tiotropium-olodateroL (STIOLTO RESPIMAT) 2.5-2.5 mcg/actuation Mist Inhale 2 puffs into the lungs Daily. Controller     Antibiotics (From admission, onward)      Start     Stop Route Frequency Ordered    08/09/24 1000  ertapenem (INVANZ) 1 g in 0.9% NaCl 100 mL IVPB (MB+)         -- IV Every 24 hours (non-standard times) 08/09/24 0913    08/07/24 1630  nitrofurantoin (macrocrystal-monohydrate) 100 MG capsule 100 mg         -- Oral Every 12 hours 08/07/24 1627    08/06/24 2100  mupirocin 2 % ointment         08/10/24 6450 Nasl 2  times daily 08/06/24 1811          Antifungals (From admission, onward)      None          Antivirals (From admission, onward)      None               There is no immunization history on file for this patient.    Family History    None       Social History     Socioeconomic History    Marital status:    Tobacco Use    Smoking status: Never    Smokeless tobacco: Never     Social Determinants of Health     Financial Resource Strain: Patient Unable To Answer (8/8/2024)    Overall Financial Resource Strain (CARDIA)     Difficulty of Paying Living Expenses: Patient unable to answer   Food Insecurity: Patient Unable To Answer (8/8/2024)    Hunger Vital Sign     Worried About Running Out of Food in the Last Year: Patient unable to answer     Ran Out of Food in the Last Year: Patient unable to answer   Transportation Needs: Patient Unable To Answer (8/8/2024)    TRANSPORTATION NEEDS     Transportation : Patient unable to answer   Stress: Patient Unable To Answer (8/8/2024)    [employer] [place] [employer] - Occupational Stress Questionnaire     Feeling of Stress : Patient unable to answer   Housing Stability: Patient Unable To Answer (8/8/2024)    Housing Stability Vital Sign     Unable to Pay for Housing in the Last Year: Patient unable to answer     Homeless in the Last Year: Patient unable to answer     Review of Systems   Constitutional:  Negative for chills, diaphoresis, fatigue and fever.   HENT: Negative.     Respiratory:  Negative for cough and shortness of breath.    Cardiovascular:  Negative for leg swelling.   Gastrointestinal:  Positive for diarrhea. Negative for blood in stool, nausea and vomiting.   Genitourinary:  Negative for difficulty urinating, dysuria, flank pain and pelvic pain.   Skin:  Negative for rash and wound.   Psychiatric/Behavioral:  Positive for confusion (though oriented). Negative for agitation.      Objective:     Vital Signs (Most Recent):  Temp: 98.2 °F (36.8 °C)  (08/09/24 1549)  Pulse: 84 (08/09/24 1549)  Resp: 18 (08/09/24 1549)  BP: (!) 178/83 (08/09/24 1549)  SpO2: 97 % (08/09/24 1549) Vital Signs (24h Range):  Temp:  [97.6 °F (36.4 °C)-98.3 °F (36.8 °C)] 98.2 °F (36.8 °C)  Pulse:  [77-94] 84  Resp:  [18-34] 18  SpO2:  [84 %-99 %] 97 %  BP: (134-204)/(70-94) 178/83     Weight: 83.5 kg (184 lb 1.4 oz)  Body mass index is 28.83 kg/m².    Estimated Creatinine Clearance: 36.4 mL/min (based on SCr of 1.3 mg/dL).     Physical Exam  Vitals reviewed.   Constitutional:       General: She is not in acute distress.     Appearance: Normal appearance. She is not ill-appearing.   HENT:      Head: Normocephalic.      Nose: Nose normal.      Mouth/Throat:      Mouth: Mucous membranes are moist.      Pharynx: Oropharynx is clear.   Eyes:      General:         Right eye: No discharge.         Left eye: No discharge.      Conjunctiva/sclera: Conjunctivae normal.   Cardiovascular:      Rate and Rhythm: Normal rate and regular rhythm.      Pulses: Normal pulses.      Heart sounds: Normal heart sounds. No murmur heard.  Pulmonary:      Effort: Pulmonary effort is normal. No respiratory distress.      Breath sounds: Normal breath sounds. No stridor.   Abdominal:      General: Abdomen is flat. There is no distension.      Palpations: Abdomen is soft.      Tenderness: There is no abdominal tenderness. There is no right CVA tenderness or left CVA tenderness.   Musculoskeletal:         General: Normal range of motion.      Cervical back: Normal range of motion.      Right lower leg: No edema.      Left lower leg: No edema.   Skin:     Findings: No lesion or rash.      Comments: With healed left hip incision, without surrounding swelling, redness. Without tenderness to palpation. Without drainage or underlying fluctuance.    Neurological:      Mental Status: She is alert and oriented to person, place, and time.      Comments: Mumbling speech, with intermittent confusion, but overall, alert and  oriented x4    Psychiatric:         Mood and Affect: Mood normal.          Significant Labs: All pertinent labs within the past 24 hours have been reviewed.    Significant Imaging: I have reviewed all pertinent imaging results/findings within the past 24 hours.

## 2024-08-10 LAB
CRP SERPL-MCNC: 287.3 MG/L (ref 0–8.2)
POCT GLUCOSE: 93 MG/DL (ref 70–110)

## 2024-08-10 PROCEDURE — 63600175 PHARM REV CODE 636 W HCPCS: Performed by: REGISTERED NURSE

## 2024-08-10 PROCEDURE — 63600175 PHARM REV CODE 636 W HCPCS: Performed by: STUDENT IN AN ORGANIZED HEALTH CARE EDUCATION/TRAINING PROGRAM

## 2024-08-10 PROCEDURE — 25000003 PHARM REV CODE 250: Performed by: STUDENT IN AN ORGANIZED HEALTH CARE EDUCATION/TRAINING PROGRAM

## 2024-08-10 PROCEDURE — 11000001 HC ACUTE MED/SURG PRIVATE ROOM

## 2024-08-10 PROCEDURE — 86140 C-REACTIVE PROTEIN: CPT | Performed by: STUDENT IN AN ORGANIZED HEALTH CARE EDUCATION/TRAINING PROGRAM

## 2024-08-10 PROCEDURE — 99233 SBSQ HOSP IP/OBS HIGH 50: CPT | Mod: ,,, | Performed by: STUDENT IN AN ORGANIZED HEALTH CARE EDUCATION/TRAINING PROGRAM

## 2024-08-10 PROCEDURE — 25000003 PHARM REV CODE 250: Performed by: REGISTERED NURSE

## 2024-08-10 PROCEDURE — 94761 N-INVAS EAR/PLS OXIMETRY MLT: CPT

## 2024-08-10 PROCEDURE — S5010 5% DEXTROSE AND 0.45% SALINE: HCPCS | Performed by: INTERNAL MEDICINE

## 2024-08-10 PROCEDURE — 25000003 PHARM REV CODE 250: Performed by: HOSPITALIST

## 2024-08-10 PROCEDURE — 36415 COLL VENOUS BLD VENIPUNCTURE: CPT | Performed by: STUDENT IN AN ORGANIZED HEALTH CARE EDUCATION/TRAINING PROGRAM

## 2024-08-10 PROCEDURE — 25000003 PHARM REV CODE 250: Performed by: INTERNAL MEDICINE

## 2024-08-10 PROCEDURE — 63600175 PHARM REV CODE 636 W HCPCS: Performed by: HOSPITALIST

## 2024-08-10 RX ORDER — DEXTROSE MONOHYDRATE AND SODIUM CHLORIDE 5; .45 G/100ML; G/100ML
INJECTION, SOLUTION INTRAVENOUS CONTINUOUS
Status: DISCONTINUED | OUTPATIENT
Start: 2024-08-10 | End: 2024-08-12

## 2024-08-10 RX ADMIN — DEXTROSE AND SODIUM CHLORIDE: 5; 450 INJECTION, SOLUTION INTRAVENOUS at 09:08

## 2024-08-10 RX ADMIN — MEROPENEM 2 G: 2 INJECTION, POWDER, FOR SOLUTION INTRAVENOUS at 06:08

## 2024-08-10 RX ADMIN — LEVETIRACETAM 500 MG: 100 INJECTION INTRAVENOUS at 08:08

## 2024-08-10 RX ADMIN — SODIUM CHLORIDE 250 ML: 9 INJECTION, SOLUTION INTRAVENOUS at 02:08

## 2024-08-10 RX ADMIN — MEROPENEM 2 G: 2 INJECTION, POWDER, FOR SOLUTION INTRAVENOUS at 05:08

## 2024-08-10 RX ADMIN — LEVOTHYROXINE SODIUM 25 MCG: 25 TABLET ORAL at 06:08

## 2024-08-10 RX ADMIN — HYDRALAZINE HYDROCHLORIDE 5 MG: 20 INJECTION, SOLUTION INTRAMUSCULAR; INTRAVENOUS at 08:08

## 2024-08-10 RX ADMIN — LEVETIRACETAM 500 MG: 100 INJECTION INTRAVENOUS at 09:08

## 2024-08-10 NOTE — PROGRESS NOTES
Yossi Vargas - Neurosurgery ([facility])  [facility]  Progress Note    Patient Name: Shaye Clemente  MRN: [id number]  Patient Class: IP- Inpatient   Admission Date: 8/6/2024  Length of Stay: 4 days  Attending Physician: Traci Mancini MD  Primary Care Provider: Skip Singh MD        Subjective:     Principal Problem:Seizure-like activity        HPI:  84-year-old female with a left hip hemiarthroplasty in June (by report had hemiarthroplasty of closed comminuted femoral neck hip fracture), paroxysmal atrial fibrillation (on Xarelto), hypothyroidism, dementia, and hyperlipidemia admitted to [facility] on August 4 with low hemoglobin.  Patient reported tarry stools for several days, but Hemoccult was negative in the emergency department.  She also noted discomfort and swelling around her left hip.  Labs in the emergency department were concerning for anemia with hemoglobin 6.5.  There was also concern for possible cystitis, and she was placed on ciprofloxacin.  CT of the head had no acute abnormalities, and CT of the left hip showed soft tissue fullness and fluid in the deep left pelvis and presacral region.  Initial EKG showed sinus rhythm.  She was transfused 2 units packed red blood cells and admitted to [facility].  With transfusion, hemoglobin increased to 9.3.  She was confused during her stay.  She was taken for CT of the abdomen and pelvis on the morning of August 5 when she began to have seizure activity.  She had fluttering of her eyes and movement of her head along with dorsiflexion of both feet.  She was given Keppra and placed on twice daily Keppra.  MRI brain had no acute intracranial abnormality.     She underwent Neurology tele-consultation, and recommendations included additional Keppra along with Q 4 hour neuro checks and routine EEG.  She was lethargic and required repeated stimulation for her to participate during examination.  She was oriented to person and time.  She was able  to follow simple commands.  Mentation appears to be slowly improving.      She underwent noncontrasted CT of the abdomen and pelvis that showed a subcutaneous fluid collection overlying the left hip which may represent resolving hematoma or postsurgical seroma.  There were also findings suspicious for intramuscular hematoma in the left iliopsoas, psoas, and iliacus.  Transfer center spoke with Orthopedic Surgery at [facility].  At present, there does not appear to be surgical intervention indicated in the left hip.  Case also discussed with Interventional Radiology at [facility].  Monitor the trend in hemoglobin and hemodynamic status.  If she remains stable, continue monitoring.  If she has a significant drop in hemoglobin or show signs of hemodynamic instability, she may need CTA of the abdomen and pelvis (bleed protocol) to determine if IR intervention is indicated. Pt. Transferred toHospital Medicine at [facility] in step-down status for EEG and Neurology evaluation of new onset seizures as well as monitoring the trend in her hemoglobin.     August 5:  Sodium 135, potassium 3.2, chloride 104, CO2 23, BUN 12, creatinine 1.7, glucose 84, calcium 8.4, magnesium 1.4, phosphorus 3.2, AST 20, ALT, white blood cells 7.13, hemoglobin 9.3, hematocrit 27.9, platelets 242  -repeat CBC on the afternoon of August 5 had hemoglobin 10.7 and hematocrit 32.7 (improved from earlier).  -CT abdomen and pelvis without contrast noted small hypoattenuating nodule in the right hepatic lobe may represent a cyst.  Diverticulosis.  Recent right total left hip arthroplasty with poorly defined subcutaneous fluid collection overlying the left hip which may represent resolving hematoma or postsurgical seroma.  Postsurgical abscess not excludable.  Findings suspicious for intramuscular hematoma of the left iliopsoas, psoas, and iliacus muscles with potential active hemorrhage.  -MRI brain was motion limited.  No acute  intracranial abnormality.  Cortical atrophy with periventricular deep white matter change consistent with chronic small-vessel ischemic disease.     August 4: Blood cultures with no growth to date, INR 1.8, iron saturation 19%, stool for occult blood negative, lactic acid 0.7, hemoglobin 6.7, hematocrit 20.5, procalcitonin 0.26  -urinalysis with 2+ protein, trace blood, trace leukocytes, 4 RBC, 8 WBC, many bacteria  -chest x-ray had no acute cardiopulmonary process identified  -CT head had no hemorrhage or other acute intracranial CT findings.  -CT left hip showed nonspecific soft tissue fullness to lateral hip measuring 9.4 x 8.7 x 4.8 cm.  Partially imaged slightly hyperattenuating fluid in the left deep pelvis and presacral region which may represent blood products.  Postsurgical changes of left hip arthroplasty.  -EKG showed normal sinus rhythm and appeared to be fairly normal.    Overview/Hospital Course:  Patient maintained stable hemoglobin since admission to [facility].  Working well with speech therapy, tolerating bite size soft diet.  TSH noted to be markedly elevated up to 19, started on Synthroid    No new subjective & objective note has been filed under this hospital service since the last note was generated.      Assessment/Plan:      * Seizure-like activity  -Reported seizure at [facility] prior to transfer pt. Was post-ictal and remains confused  -MRI brain uremarkable  -EEG so far unremarkable for any seizure activity   -currently on Keppra 500, follow up Neurology recommendations   -seizure precautions, fall precautions   -neuro checks      Hypertension  Pressures noted to be in the 180s systolic   Continue home Toprol   Follow up with pharmacy and family regarding home medication list    Dementia  Neuro checks   Fall precautions   Seizure precautions  Memantine   Follow up with pharmacy consultation and family input for home medication list    Hypothyroidism  Starting Synthroid 88 mcg  "  Following up with a pharmacy consult for home medication reconciliation      LUIS CARLOS (acute kidney injury)  -unclear if baseline, creatinine initially elevated at 2.0 now improving down to 1.4,   -stopping IV fluids    Encephalopathy, metabolic  -Pt. With intermittent confusion, seizure activity. Mentation seems to be improving with time after seizure   -do not suspect true UTI as cause given very low colony count  -MRI brain negative for acute findings.   -TSH markedly elevated, see below for further treatment, could have been easily contributing factor  -Delirium precautions ordered    Hip hematoma, left  -Pt. Presented anemia, required 1 unit pRBC, Hgb now stable ~10. CT abdomen conerning for subcutaneous fluid collection overlying the left hip may represent resolving hematoma or postsurgical seroma andFindings suspicious for an intramuscular hematoma of the left iliopsoas, psoas and iliacus muscles with potential active hemorrhage.  -Case discussed with IR and ortho prior to transfer, plan "Monitor the trend in hemoglobin and hemodynamic status. If she remains stable, continue monitoring. If she has a significant drop in hemoglobin or show signs of hemodynamic instability, she may need CTA of the abdomen and pelvis (bleed protocol) to determine if IR intervention is indicated"          Paroxysmal atrial fibrillation  -Hx of pAfib. EKG on admit showed NSR, rhythm currently regular  -Continue home metoprolol and monitor on telemetry.  Holding Xarelto due to bleeding concerns-this was affirmed by the daughter via phone    Acute cystitis with hematuria  -appears to be Enterococcus, unclear if truly infectious given very low colony count  -switching from Levaquin over to Macrobid        VTE Risk Mitigation (From admission, onward)           Ordered     Place sequential compression device  Until discontinued         08/06/24 1811                    Discharge Planning   KATI: 8/12/2024     Code Status: DNR   Is the " patient medically ready for discharge?:     Reason for patient still in hospital (select all that apply): Patient trending condition  Discharge Plan A: Skilled Nursing Facility                  Traci Mancini MD  Department of Hospital Medicine   [facility] - Neurosurgery ([facility])

## 2024-08-10 NOTE — NURSING
Patient up thus far this shift 125 mL. Dr. Vo notified and orders received for IV fluids for 4 hours.

## 2024-08-10 NOTE — NURSING
Oral care done on pateint. Patient then sat up in bed to 90 degrees. Patient awake, alert talking with RN patient attempt to swallow water and had an aspiration event, Dr. Vo notified and patient made NPO until seen by SLP. Charge RN notified.

## 2024-08-10 NOTE — NURSING
Nurses Note -- 4 Eyes      8/10/2024   6:38 AM      Skin assessed during: Q Shift Change      [x] No Altered Skin Integrity Present    []Prevention Measures Documented      [] Yes- Altered Skin Integrity Present or Discovered   [] LDA Added if Not in Epic (Describe Wound)   [] New Altered Skin Integrity was Present on Admit and Documented in LDA   [] Wound Image Taken    Wound Care Consulted? No    Attending Nurse:  Traci Gallegos RN/Staff Member:  [place]

## 2024-08-10 NOTE — SUBJECTIVE & OBJECTIVE
Interval History: Pt remains AF, VSS, Hds, wbc nml. Procalc 0.26. . No TTP to Left hip. Skin intact, no local signs of infection. Pt on merrem empirically.     Review of Systems   Constitutional:  Negative for chills, diaphoresis, fatigue and fever.   HENT: Negative.     Respiratory:  Negative for cough and shortness of breath.    Cardiovascular:  Negative for leg swelling.   Gastrointestinal:  Negative for blood in stool, diarrhea, nausea and vomiting.   Genitourinary:  Negative for difficulty urinating, dysuria, flank pain and pelvic pain.   Skin:  Negative for rash and wound.   Psychiatric/Behavioral:  Positive for confusion (though oriented). Negative for agitation.      Objective:     Vital Signs (Most Recent):  Temp: 97.5 °F (36.4 °C) (08/10/24 1201)  Pulse: 73 (08/10/24 1201)  Resp: 18 (08/10/24 1201)  BP: (!) 165/70 (08/10/24 1201)  SpO2: 97 % (08/10/24 1201) Vital Signs (24h Range):  Temp:  [97.5 °F (36.4 °C)-98.3 °F (36.8 °C)] 97.5 °F (36.4 °C)  Pulse:  [63-82] 73  Resp:  [18-24] 18  SpO2:  [96 %-97 %] 97 %  BP: (136-198)/(70-95) 165/70     Weight: 81 kg (178 lb 9.2 oz)  Body mass index is 27.97 kg/m².    Estimated Creatinine Clearance: 35.9 mL/min (based on SCr of 1.3 mg/dL).     Physical Exam  Vitals reviewed.   Constitutional:       General: She is not in acute distress.     Appearance: Normal appearance. She is not ill-appearing.   HENT:      Head: Normocephalic.      Nose: Nose normal.      Mouth/Throat:      Mouth: Mucous membranes are moist.      Pharynx: Oropharynx is clear.   Eyes:      General:         Right eye: No discharge.         Left eye: No discharge.      Conjunctiva/sclera: Conjunctivae normal.   Cardiovascular:      Rate and Rhythm: Normal rate and regular rhythm.      Pulses: Normal pulses.      Heart sounds: Normal heart sounds. No murmur heard.  Pulmonary:      Effort: Pulmonary effort is normal. No respiratory distress.      Breath sounds: Normal breath sounds. No stridor.    Abdominal:      General: Abdomen is flat. There is no distension.      Palpations: Abdomen is soft.      Tenderness: There is no abdominal tenderness. There is no right CVA tenderness or left CVA tenderness.   Musculoskeletal:         General: Normal range of motion.      Cervical back: Normal range of motion.      Right lower leg: No edema.      Left lower leg: No edema.   Skin:     Findings: No lesion or rash.      Comments: With healed left hip incision, without surrounding swelling, redness. Without tenderness to palpation. Without drainage or underlying fluctuance.    Neurological:      Mental Status: She is alert and oriented to person, place, and time.      Comments: Mumbling speech, with intermittent confusion, but overall, alert and oriented x4    Psychiatric:         Mood and Affect: Mood normal.          Significant Labs: Blood Culture:   Recent Labs   Lab 08/04/24  1135 08/04/24  1202 08/07/24  1007   LABBLOO No growth after 5 days. No growth after 5 days. No Growth to date  No Growth to date  No Growth to date  No Growth to date     CBC:   Recent Labs   Lab 08/08/24  2014 08/09/24  0018 08/09/24  0413   WBC  --   --  11.45   HGB  --   --  10.2*   HCT 29* 31* 31.8*   PLT  --   --  245     CMP:   Recent Labs   Lab 08/09/24  0413      K 3.9      CO2 24   *   BUN 16   CREATININE 1.3   CALCIUM 8.8   PROT 9.5*   ALBUMIN 2.6*   BILITOT 0.9   ALKPHOS 111   AST 26   ALT 11   ANIONGAP 8     Microbiology Results (last 7 days)       Procedure Component Value Units Date/Time    Blood culture [[id number]] Collected: 08/07/24 1007    Order Status: Completed Specimen: Blood Updated: 08/10/24 1212     Blood Culture, Routine No Growth to date      No Growth to date      No Growth to date      No Growth to date    Narrative:      Picc line          Urine Culture:   Recent Labs   Lab 08/04/24  1452   LABURIN ENTEROCOCCUS FAECIUM VRE  50,000 - 99,999 cfu/ml  *     Urine Studies:   Recent Labs   Lab  08/04/24  1452 08/09/24  0919   COLORU Yellow Yellow   APPEARANCEUA Clear Clear   PHUR 7.0 7.0   SPECGRAV 1.015 1.015   PROTEINUA 2+* 2+*   GLUCUA Negative Negative   KETONESU Negative Negative   BILIRUBINUA Negative Negative   OCCULTUA Trace* Trace*   NITRITE Negative Negative   UROBILINOGEN Negative  --    LEUKOCYTESUR Trace* Negative   RBCUA 4 3   WBCUA 8* 2   BACTERIA Many* Rare   SQUAMEPITHEL 4  --    HYALINECASTS 0 3*     All pertinent labs within the past 24 hours have been reviewed.    Significant Imaging: I have reviewed all pertinent imaging results/findings within the past 24 hours.    I have personally reviewed records / hospital notes from   service and other specialty providers. I have also reviewed CBC, CMP/BMP,  cultures and imaging with my interpretation as documented in my assessment / plan.    Patient is high risk for infectious complications given pt's age, multiple co-morbidities, and case complexity.      Time: 50 minutes   50% of time spent on face-to-face counseling and coordination of care. Counseling included review of test results, diagnosis, and treatment plan with patient and/or family.

## 2024-08-10 NOTE — CARE UPDATE
RN concerned about patients swallowing capability with current diet (level 6). Messaged the following    I think patient needs to be npo until seen by speech, she just had an aspiration issues while swallowing water, she was awake and sitting up at a 90 degree angle     -make NPO until seen by speech therapy

## 2024-08-10 NOTE — PLAN OF CARE
Problem: Adult Inpatient Plan of Care  Goal: Plan of Care Review  Outcome: Progressing  Flowsheets (Taken 8/10/2024 0635)  Plan of Care Reviewed With: patient  Goal: Patient-Specific Goal (Individualized)  Outcome: Progressing  Goal: Optimal Comfort and Wellbeing  Outcome: Progressing  Intervention: Monitor Pain and Promote Comfort  Flowsheets (Taken 8/10/2024 0635)  Pain Management Interventions: care clustered  Intervention: Provide Person-Centered Care  Flowsheets (Taken 8/10/2024 0635)  Trust Relationship/Rapport:   care explained   questions encouraged   reassurance provided   choices provided   emotional support provided   thoughts/feelings acknowledged   empathic listening provided   questions answered     Problem: Infection  Goal: Absence of Infection Signs and Symptoms  Outcome: Progressing  Intervention: Prevent or Manage Infection  Flowsheets (Taken 8/10/2024 0635)  Infection Management: aseptic technique maintained

## 2024-08-11 LAB
POCT GLUCOSE: 100 MG/DL (ref 70–110)
POCT GLUCOSE: 108 MG/DL (ref 70–110)
POCT GLUCOSE: 109 MG/DL (ref 70–110)
POCT GLUCOSE: 122 MG/DL (ref 70–110)

## 2024-08-11 PROCEDURE — 97112 NEUROMUSCULAR REEDUCATION: CPT

## 2024-08-11 PROCEDURE — 25000003 PHARM REV CODE 250: Performed by: INTERNAL MEDICINE

## 2024-08-11 PROCEDURE — 97535 SELF CARE MNGMENT TRAINING: CPT

## 2024-08-11 PROCEDURE — 63600175 PHARM REV CODE 636 W HCPCS: Performed by: STUDENT IN AN ORGANIZED HEALTH CARE EDUCATION/TRAINING PROGRAM

## 2024-08-11 PROCEDURE — 97530 THERAPEUTIC ACTIVITIES: CPT | Mod: CQ

## 2024-08-11 PROCEDURE — 25000003 PHARM REV CODE 250: Performed by: HOSPITALIST

## 2024-08-11 PROCEDURE — 97110 THERAPEUTIC EXERCISES: CPT | Mod: CQ

## 2024-08-11 PROCEDURE — 11000001 HC ACUTE MED/SURG PRIVATE ROOM

## 2024-08-11 PROCEDURE — 25000003 PHARM REV CODE 250: Performed by: STUDENT IN AN ORGANIZED HEALTH CARE EDUCATION/TRAINING PROGRAM

## 2024-08-11 PROCEDURE — 97530 THERAPEUTIC ACTIVITIES: CPT

## 2024-08-11 PROCEDURE — 63600175 PHARM REV CODE 636 W HCPCS: Performed by: HOSPITALIST

## 2024-08-11 PROCEDURE — 94761 N-INVAS EAR/PLS OXIMETRY MLT: CPT

## 2024-08-11 PROCEDURE — 92526 ORAL FUNCTION THERAPY: CPT

## 2024-08-11 PROCEDURE — S5010 5% DEXTROSE AND 0.45% SALINE: HCPCS | Performed by: INTERNAL MEDICINE

## 2024-08-11 RX ADMIN — LEVETIRACETAM 500 MG: 100 INJECTION INTRAVENOUS at 09:08

## 2024-08-11 RX ADMIN — DOCUSATE SODIUM 100 MG: 100 CAPSULE, LIQUID FILLED ORAL at 08:08

## 2024-08-11 RX ADMIN — HYDRALAZINE HYDROCHLORIDE 5 MG: 20 INJECTION, SOLUTION INTRAMUSCULAR; INTRAVENOUS at 09:08

## 2024-08-11 RX ADMIN — MEROPENEM 2 G: 2 INJECTION, POWDER, FOR SOLUTION INTRAVENOUS at 06:08

## 2024-08-11 RX ADMIN — ATORVASTATIN CALCIUM 40 MG: 40 TABLET, FILM COATED ORAL at 08:08

## 2024-08-11 RX ADMIN — MONTELUKAST 10 MG: 10 TABLET, FILM COATED ORAL at 08:08

## 2024-08-11 RX ADMIN — DEXTROSE AND SODIUM CHLORIDE: 5; 450 INJECTION, SOLUTION INTRAVENOUS at 10:08

## 2024-08-11 RX ADMIN — CARVEDILOL 12.5 MG: 12.5 TABLET, FILM COATED ORAL at 08:08

## 2024-08-11 RX ADMIN — MEMANTINE 5 MG: 5 TABLET ORAL at 08:08

## 2024-08-11 NOTE — PROGRESS NOTES
[facility] - Neurosurgery ([facility])  [facility]  Progress Note    Patient Name: Shaye Clemente  MRN: [id number]  Patient Class: IP- Inpatient   Admission Date: 8/6/2024  Length of Stay: 5 days  Attending Physician: Traci Mancini MD  Primary Care Provider: Skip Singh MD        Subjective:     Principal Problem:Seizure-like activity        HPI:  84-year-old female with a left hip hemiarthroplasty in June (by report had hemiarthroplasty of closed comminuted femoral neck hip fracture), paroxysmal atrial fibrillation (on Xarelto), hypothyroidism, dementia, and hyperlipidemia admitted to [facility] on August 4 with low hemoglobin.  Patient reported tarry stools for several days, but Hemoccult was negative in the emergency department.  She also noted discomfort and swelling around her left hip.  Labs in the emergency department were concerning for anemia with hemoglobin 6.5.  There was also concern for possible cystitis, and she was placed on ciprofloxacin.  CT of the head had no acute abnormalities, and CT of the left hip showed soft tissue fullness and fluid in the deep left pelvis and presacral region.  Initial EKG showed sinus rhythm.  She was transfused 2 units packed red blood cells and admitted to [facility].  With transfusion, hemoglobin increased to 9.3.  She was confused during her stay.  She was taken for CT of the abdomen and pelvis on the morning of August 5 when she began to have seizure activity.  She had fluttering of her eyes and movement of her head along with dorsiflexion of both feet.  She was given Keppra and placed on twice daily Keppra.  MRI brain had no acute intracranial abnormality.     She underwent Neurology tele-consultation, and recommendations included additional Keppra along with Q 4 hour neuro checks and routine EEG.  She was lethargic and required repeated stimulation for her to participate during examination.  She was oriented to person and time.  She was able  to follow simple commands.  Mentation appears to be slowly improving.      She underwent noncontrasted CT of the abdomen and pelvis that showed a subcutaneous fluid collection overlying the left hip which may represent resolving hematoma or postsurgical seroma.  There were also findings suspicious for intramuscular hematoma in the left iliopsoas, psoas, and iliacus.  Transfer center spoke with Orthopedic Surgery at [facility].  At present, there does not appear to be surgical intervention indicated in the left hip.  Case also discussed with Interventional Radiology at [facility].  Monitor the trend in hemoglobin and hemodynamic status.  If she remains stable, continue monitoring.  If she has a significant drop in hemoglobin or show signs of hemodynamic instability, she may need CTA of the abdomen and pelvis (bleed protocol) to determine if IR intervention is indicated. Pt. Transferred toHospital Medicine at [facility] in step-down status for EEG and Neurology evaluation of new onset seizures as well as monitoring the trend in her hemoglobin.     August 5:  Sodium 135, potassium 3.2, chloride 104, CO2 23, BUN 12, creatinine 1.7, glucose 84, calcium 8.4, magnesium 1.4, phosphorus 3.2, AST 20, ALT, white blood cells 7.13, hemoglobin 9.3, hematocrit 27.9, platelets 242  -repeat CBC on the afternoon of August 5 had hemoglobin 10.7 and hematocrit 32.7 (improved from earlier).  -CT abdomen and pelvis without contrast noted small hypoattenuating nodule in the right hepatic lobe may represent a cyst.  Diverticulosis.  Recent right total left hip arthroplasty with poorly defined subcutaneous fluid collection overlying the left hip which may represent resolving hematoma or postsurgical seroma.  Postsurgical abscess not excludable.  Findings suspicious for intramuscular hematoma of the left iliopsoas, psoas, and iliacus muscles with potential active hemorrhage.  -MRI brain was motion limited.  No acute  intracranial abnormality.  Cortical atrophy with periventricular deep white matter change consistent with chronic small-vessel ischemic disease.     August 4: Blood cultures with no growth to date, INR 1.8, iron saturation 19%, stool for occult blood negative, lactic acid 0.7, hemoglobin 6.7, hematocrit 20.5, procalcitonin 0.26  -urinalysis with 2+ protein, trace blood, trace leukocytes, 4 RBC, 8 WBC, many bacteria  -chest x-ray had no acute cardiopulmonary process identified  -CT head had no hemorrhage or other acute intracranial CT findings.  -CT left hip showed nonspecific soft tissue fullness to lateral hip measuring 9.4 x 8.7 x 4.8 cm.  Partially imaged slightly hyperattenuating fluid in the left deep pelvis and presacral region which may represent blood products.  Postsurgical changes of left hip arthroplasty.  -EKG showed normal sinus rhythm and appeared to be fairly normal.    Overview/Hospital Course:  Patient maintained stable hemoglobin since admission to [facility].  Working well with speech therapy, tolerating bite size soft diet.  TSH noted to be markedly elevated up to 19, started on Synthroid    Interval History:   8/11: no acute changes overnight.     Review of Systems   Constitutional:  Negative for activity change, appetite change, chills, fever and unexpected weight change.   HENT:  Negative for congestion and sore throat.    Respiratory:  Negative for cough and shortness of breath.    Cardiovascular:  Negative for chest pain, palpitations and leg swelling.   Gastrointestinal:  Negative for abdominal distention, abdominal pain, blood in stool, constipation, diarrhea, nausea and vomiting.   Genitourinary:  Negative for difficulty urinating, dysuria and hematuria.   Musculoskeletal:  Negative for arthralgias and myalgias.   Skin:  Negative for color change and rash.   Neurological:  Negative for dizziness, tremors and seizures.   Psychiatric/Behavioral:  Positive for confusion.      Objective:      Vital Signs (Most Recent):  Temp: 97.9 °F (36.6 °C) (08/11/24 1147)  Pulse: 92 (08/11/24 1246)  Resp: 18 (08/11/24 1147)  BP: (!) 160/73 (08/11/24 1246)  SpO2: 96 % (08/11/24 1147) Vital Signs (24h Range):  Temp:  [97.9 °F (36.6 °C)-98.9 °F (37.2 °C)] 97.9 °F (36.6 °C)  Pulse:  [81-92] 92  Resp:  [16-24] 18  SpO2:  [94 %-98 %] 96 %  BP: (160-197)/(62-95) 160/73     Weight: 82.5 kg (181 lb 14.1 oz)  Body mass index is 28.49 kg/m².    Intake/Output Summary (Last 24 hours) at 8/11/2024 1503  Last data filed at 8/11/2024 0200  Gross per 24 hour   Intake --   Output 550 ml   Net -550 ml         Physical Exam  Vitals reviewed.   Constitutional:       General: She is not in acute distress.     Appearance: She is well-developed.   HENT:      Head: Normocephalic and atraumatic.      Comments: EEG in place.   Eyes:      Extraocular Movements: Extraocular movements intact.      Pupils: Pupils are equal, round, and reactive to light.   Neck:      Vascular: No JVD.      Trachea: No tracheal deviation.   Cardiovascular:      Rate and Rhythm: Normal rate and regular rhythm.      Heart sounds: No murmur heard.     No friction rub. No gallop.   Pulmonary:      Effort: No respiratory distress.      Breath sounds: Normal breath sounds. No wheezing or rales.   Abdominal:      General: Bowel sounds are normal. There is no distension.      Palpations: Abdomen is soft. There is no mass.      Tenderness: There is no abdominal tenderness.   Musculoskeletal:         General: No deformity.      Cervical back: Neck supple.   Lymphadenopathy:      Cervical: No cervical adenopathy.   Skin:     General: Skin is warm and dry.      Findings: No rash.   Neurological:      Mental Status: She is alert. She is disoriented.      Cranial Nerves: Cranial nerves 2-12 are intact.      Deep Tendon Reflexes:      Reflex Scores:       Tricep reflexes are 2+ on the right side and 2+ on the left side.       Bicep reflexes are 2+ on the right side and 2+ on  the left side.       Patellar reflexes are 2+ on the right side and 2+ on the left side.     Comments: Alert to person, place in hospital and year but not date/month/circumstances behind hospitalization, mildly confused   Psychiatric:         Speech: Speech is slurred.             Significant Labs: All pertinent labs within the past 24 hours have been reviewed.    Significant Imaging: I have reviewed all pertinent imaging results/findings within the past 24 hours.    Assessment/Plan:      * Seizure-like activity  -Reported seizure at [facility] prior to transfer pt. Was post-ictal and remains confused  -MRI brain uremarkable  -EEG so far unremarkable for any seizure activity   -currently on Keppra 500, follow up Neurology recommendations   -seizure precautions, fall precautions   -neuro checks      Hypertension  Pressures noted to be in the 180s systolic   Continue home Toprol   Follow up with pharmacy and family regarding home medication list    Dementia  Neuro checks   Fall precautions   Seizure precautions  [place]   Follow up with pharmacy consultation and family input for home medication list    Hypothyroidism  Starting Synthroid 88 mcg   Following up with a pharmacy consult for home medication reconciliation      LUIS CARLOS (acute kidney injury)  -unclear if baseline, creatinine initially elevated at 2.0 now improving down to 1.4,   -stopping IV fluids    Encephalopathy, metabolic  -Pt. With intermittent confusion, seizure activity. Mentation seems to be improving with time after seizure   -do not suspect true UTI as cause given very low colony count  -MRI brain negative for acute findings.   -TSH markedly elevated, see below for further treatment, could have been easily contributing factor  -Delirium precautions ordered    Hip hematoma, left  -Pt. Presented anemia, required 1 unit pRBC, Hgb now stable ~10. CT abdomen conerning for subcutaneous fluid collection overlying the left hip may represent resolving  "hematoma or postsurgical seroma andFindings suspicious for an intramuscular hematoma of the left iliopsoas, psoas and iliacus muscles with potential active hemorrhage.  -Case discussed with IR and ortho prior to transfer, plan "Monitor the trend in hemoglobin and hemodynamic status. If she remains stable, continue monitoring. If she has a significant drop in hemoglobin or show signs of hemodynamic instability, she may need CTA of the abdomen and pelvis (bleed protocol) to determine if IR intervention is indicated"          Paroxysmal atrial fibrillation  -Hx of pAfib. EKG on admit showed NSR, rhythm currently regular  -Continue home metoprolol and monitor on telemetry.  Holding Xarelto due to bleeding concerns-this was affirmed by the daughter via phone    Acute cystitis with hematuria  -appears to be Enterococcus, unclear if truly infectious given very low colony count  -switching from Levaquin over to Macrobid        VTE Risk Mitigation (From admission, onward)           Ordered     Place sequential compression device  Until discontinued         08/06/24 1811                    Discharge Planning   KATI: 8/12/2024     Code Status: DNR   Is the patient medically ready for discharge?:     Reason for patient still in hospital (select all that apply): Patient trending condition  Discharge Plan A: Skilled Nursing Facility                  Traci Mancini MD  Department of Hospital Medicine   [facility] - Neurosurgery ([facility])    "

## 2024-08-11 NOTE — SUBJECTIVE & OBJECTIVE
Interval History:   8/11: no acute changes overnight.     Review of Systems   Constitutional:  Negative for activity change, appetite change, chills, fever and unexpected weight change.   HENT:  Negative for congestion and sore throat.    Respiratory:  Negative for cough and shortness of breath.    Cardiovascular:  Negative for chest pain, palpitations and leg swelling.   Gastrointestinal:  Negative for abdominal distention, abdominal pain, blood in stool, constipation, diarrhea, nausea and vomiting.   Genitourinary:  Negative for difficulty urinating, dysuria and hematuria.   Musculoskeletal:  Negative for arthralgias and myalgias.   Skin:  Negative for color change and rash.   Neurological:  Negative for dizziness, tremors and seizures.   Psychiatric/Behavioral:  Positive for confusion.      Objective:     Vital Signs (Most Recent):  Temp: 97.9 °F (36.6 °C) (08/11/24 1147)  Pulse: 92 (08/11/24 1246)  Resp: 18 (08/11/24 1147)  BP: (!) 160/73 (08/11/24 1246)  SpO2: 96 % (08/11/24 1147) Vital Signs (24h Range):  Temp:  [97.9 °F (36.6 °C)-98.9 °F (37.2 °C)] 97.9 °F (36.6 °C)  Pulse:  [81-92] 92  Resp:  [16-24] 18  SpO2:  [94 %-98 %] 96 %  BP: (160-197)/(62-95) 160/73     Weight: 82.5 kg (181 lb 14.1 oz)  Body mass index is 28.49 kg/m².    Intake/Output Summary (Last 24 hours) at 8/11/2024 1503  Last data filed at 8/11/2024 0200  Gross per 24 hour   Intake --   Output 550 ml   Net -550 ml         Physical Exam  Vitals reviewed.   Constitutional:       General: She is not in acute distress.     Appearance: She is well-developed.   HENT:      Head: Normocephalic and atraumatic.      Comments: EEG in place.   Eyes:      Extraocular Movements: Extraocular movements intact.      Pupils: Pupils are equal, round, and reactive to light.   Neck:      Vascular: No JVD.      Trachea: No tracheal deviation.   Cardiovascular:      Rate and Rhythm: Normal rate and regular rhythm.      Heart sounds: No murmur heard.     No friction  rub. No gallop.   Pulmonary:      Effort: No respiratory distress.      Breath sounds: Normal breath sounds. No wheezing or rales.   Abdominal:      General: Bowel sounds are normal. There is no distension.      Palpations: Abdomen is soft. There is no mass.      Tenderness: There is no abdominal tenderness.   Musculoskeletal:         General: No deformity.      Cervical back: Neck supple.   Lymphadenopathy:      Cervical: No cervical adenopathy.   Skin:     General: Skin is warm and dry.      Findings: No rash.   Neurological:      Mental Status: She is alert. She is disoriented.      Cranial Nerves: Cranial nerves 2-12 are intact.      Deep Tendon Reflexes:      Reflex Scores:       Tricep reflexes are 2+ on the right side and 2+ on the left side.       Bicep reflexes are 2+ on the right side and 2+ on the left side.       Patellar reflexes are 2+ on the right side and 2+ on the left side.     Comments: Alert to person, place in hospital and year but not date/month/circumstances behind hospitalization, mildly confused   Psychiatric:         Speech: Speech is slurred.             Significant Labs: All pertinent labs within the past 24 hours have been reviewed.    Significant Imaging: I have reviewed all pertinent imaging results/findings within the past 24 hours.

## 2024-08-11 NOTE — PLAN OF CARE
Problem: Adult Inpatient Plan of Care  Goal: Plan of Care Review  Outcome: Progressing  Flowsheets (Taken 8/11/2024 0553)  Plan of Care Reviewed With: patient  Goal: Patient-Specific Goal (Individualized)  Outcome: Progressing  Flowsheets (Taken 8/11/2024 0553)  Individualized Care Needs: sleep scheduling  Anxieties, Fears or Concerns: being in the hospital  Patient/Family-Specific Goals (Include Timeframe): keep family updated     Problem: Infection  Goal: Absence of Infection Signs and Symptoms  Outcome: Progressing  Intervention: Prevent or Manage Infection  Flowsheets (Taken 8/11/2024 0553)  Fever Reduction/Comfort Measures: lightweight bedding  Infection Management: aseptic technique maintained     Problem: Skin Injury Risk Increased  Goal: Skin Health and Integrity  Outcome: Progressing  Intervention: Optimize Skin Protection  Flowsheets (Taken 8/11/2024 0553)  Pressure Reduction Techniques:   frequent weight shift encouraged   heels elevated off bed   pressure points protected   weight shift assistance provided  Pressure Reduction Devices:   positioning supports utilized   foam padding utilized  Skin Protection: incontinence pads utilized  Activity Management: Rolling - L1

## 2024-08-11 NOTE — PLAN OF CARE
Problem: Adult Inpatient Plan of Care  Goal: Plan of Care Review  Outcome: Progressing  Goal: Patient-Specific Goal (Individualized)  Outcome: Progressing  Goal: Absence of Hospital-Acquired Illness or Injury  Outcome: Progressing  Goal: Optimal Comfort and Wellbeing  Outcome: Progressing  Goal: Readiness for Transition of Care  Outcome: Progressing   POC and meds reviewed with patient.Patient is awake and alert, on 2 L of oxygen.Had hydralizine r/t elevated BP, med was effective.Will continue to monitor.

## 2024-08-11 NOTE — ASSESSMENT & PLAN NOTE
"I have reviewed hospital notes from   service and other specialty providers. I have also reviewed CBC, CMP/BMP,  cultures and imaging with my interpretation as documented.      84F with h/o pAFib (on xarelto), dementia, femoral neck fracture, s/p left bipolar hemiarthroplasty with hardware placed on 6/4/24 at [facility]. (Minimal records available at this time). Hosp course c/b bacteremia 06/24 1 of 2 bottles +proteus vulgaris (R-ampicillin, cefuroxime, tetracycline); and surgical wound infection. Left hip wound cx 06/27: +PSA (I-aztreonam), +Staph hemolyticus (R-tetracycline, S-vanc), +E.faecalis (panS), +proteus vulgaris (R-ampicillin, cefuroxime, Tetracycline). Pt underwent surgical debridement (no Op notes available) 07/01, surgical cxs NG.  Wound swab cxs 07/01 No growth. Also, records reveal ESBL-e.coli UTI 06/25. It seems pt has been maintained on Iv-Erta since then, despite not covering all bacteria isolated on cxs from infected surgical wound; and per family, pt was to complete prolonged course Erta ~6wks, with impending BRENDAN. -- Pt transferred to OC from [facility] for further work-up of unwitnessed seizure, and anemia. Unclear what provoked seizure. EEG was negative.    Ct-a/p and Ct-hip reveal stable hardware, but note non-specific soft-tissue fluid collection at lateral hip measuring 9.4 x 8.7 x 4.8cm, as well as IM hematoma of left iliopsoas, psoas, and iliacus muscle, with potential active hemorrhage.    ID was consulted as "Wound infection. Was on ertapenem at nursing home. Should this be continued" On 08/09, switched erta to merrem while awaiting additional records.    Recommendations:  CRP markedly elevated 290. Recommend NM bone scan to r/o osteo.   Consider ortho surgery and/or IR consult for eval and to consider fluid aspiration to further guide antibiotic therapy  Carbapenems have low risk lowering seizure threshold, so recommend very close monitoring.   Will need further " records in order to give proper recommendation, specifically [facility] records including infectious disease and ortho surgery notes, as well as culture reports with sensitivities.       -- Discussed with ID staff and primary team   -- ID will continue to follow w/ further recs.

## 2024-08-11 NOTE — PT/OT/SLP PROGRESS
"Physical Therapy Treatment  Co Treatment with Occupational Therapy     Patient Name:  Shaye Clemente   MRN:  [id number]    Recommendations:     Discharge Recommendations: Moderate Intensity Therapy  Discharge Equipment Recommendations: none  Barriers to discharge:  Increased level of assistance required    Assessment:     Shaye Clemente is a 84 y.o. female admitted with a medical diagnosis of Seizure-like activity.  She presents with the following impairments/functional limitations: weakness, impaired endurance, impaired self care skills, impaired functional mobility, gait instability, impaired balance, decreased coordination, decreased upper extremity function, decreased lower extremity function, decreased safety awareness, pain, decreased ROM, impaired fine motor, edema, impaired cardiopulmonary response to activity.    Pt met with HOB elevated and agreeable to session. Pt tolerates session well with emphasis on bed mobility, sitting balance, and therex. Pt making progress towards therapy goals as indicated by requiring decreased assistance with sitting balance this session compared to previous session. Pt unable to participate in transfers and gait training this session d/t fatigue.  Pt will continue to benefit from skilled therapy services.    Rehab Prognosis: Good; patient would benefit from acute skilled PT services to address these deficits and reach maximum level of function.    Recent Surgery: * No surgery found *      Plan:     During this hospitalization, patient to be seen 4 x/week to address the identified rehab impairments via gait training, therapeutic activities, neuromuscular re-education, therapeutic exercises and progress toward the following goals:    Plan of Care Expires:  09/07/24    Subjective     Chief Complaint: 5/10 L hip pain  Patient/Family Comments/goals: "can we try going out the bed the other way?"-   Pain/Comfort:  Pain Rating 1: 5/10  Location - Side 1: Left  Location - Orientation 1: " generalized  Location 1: hip  Pain Addressed 1: Reposition, Distraction  Pain Rating Post-Intervention 1: 5/10      Objective:     Communicated with RN (Kell) prior to session.  Patient found HOB elevated with peripheral IV, blood pressure cuff, schreiber catheter upon PTA entry to room.     General Precautions: Standard, aspiration, seizure, nectar thick  Orthopedic Precautions: N/A  Braces: N/A  Respiratory Status: Nasal cannula, flow 2 L/min     Functional Mobility:  Bed Mobility:     Scooting: anteriorly to EOB maximal assistance  Supine to Sit: moderate assistance x2 persons for Trunk/BLEs  Sit to Supine: moderate assistance x2 persons for Trunk/BLEs  Transfers:     Sit to Stand: deferred at this time d/t pt fatigue  Balance:   Sitting Balance at EOB:  Level of Assist Required: Contact Guard Assistance- Min A with fatigue  Deviations noted: L lateral lean, posterior lean with fatigue and dynamic sitting activities with OT  Total Time Sitting EOB: ~15 minutes    AM-PAC 6 CLICK MOBILITY  Turning over in bed (including adjusting bedclothes, sheets and blankets)?: 2  Sitting down on and standing up from a chair with arms (e.g., wheelchair, bedside commode, etc.): 3  Moving from lying on back to sitting on the side of the bed?: 2  Moving to and from a bed to a chair (including a wheelchair)?: 2  Need to walk in hospital room?: 2  Climbing 3-5 steps with a railing?: 1  Basic Mobility Total Score: 12       Treatment & Education:  Patient provided with daily orientation and goals of this PT session.      BLE therex completed at EOB:  X10 AP, LAQ, hip flexion  (AAROM provided for hip flexion on LLE d/t pain)    Pt educated to call for assistance and to transfer with hospital staff only.  Also, pt was educated on the effects of prolonged immobility and the importance of performing OOB activity and exercises to promote healing and reduce recovery time.    Co tx performed with OT due to patient need for 2 skilled therapist  to ensure patient and staff safety and to accommondate for activity tolerance.    Patient left HOB elevated with all lines intact, call button in reach, bed alarm on, and RN notified.    GOALS:   Multidisciplinary Problems       Physical Therapy Goals          Problem: Physical Therapy    Goal Priority Disciplines Outcome Goal Variances Interventions   Physical Therapy Goal     PT, PT/OT Progressing     Description: Goals to be met by: 24     Patient will increase functional independence with mobility by performin. Supine to sit with MInimal Assistance  2. Sit to supine with MInimal Assistance  3. Sit to stand transfer with Minimal Assistance  4. Bed to chair transfer with Minimal Assistance using LRAD as needed  5. Gait  x 150 feet with Minimal Assistance using LRAD as needed.   6. Ascend/descend 3 stair with no Handrails and Minimal Assistance  7. Lower extremity exercise program x15 reps per handout, with assistance as needed                         Time Tracking:     PT Received On: 24  PT Start Time: 0955     PT Stop Time: 1021  PT Total Time (min): 26 min     Billable Minutes: Therapeutic Activity 8 and Therapeutic Exercise 18    Treatment Type: Treatment  PT/PTA: PTA     Number of PTA visits since last PT visit: 2     2024

## 2024-08-11 NOTE — PROGRESS NOTES
"[facility] - Neurosurgery ([facility])  Infectious Disease  Progress Note    Patient Name: Shaye Clemente  MRN: [id number]  Admission Date: 8/6/2024  Length of Stay: 4 days  Attending Physician: Traci Mancini MD  Primary Care Provider: Skip Singh MD    Isolation Status: No active isolations  Assessment/Plan:      ID  Post op infection  I have reviewed hospital notes from   service and other specialty providers. I have also reviewed CBC, CMP/BMP,  cultures and imaging with my interpretation as documented.      84F with h/o pAFib (on xarelto), dementia, femoral neck fracture, s/p left bipolar hemiarthroplasty with hardware placed on 6/4/24 at [facility]. (Minimal records available at this time). Hosp course c/b bacteremia 06/24 1 of 2 bottles +proteus vulgaris (R-ampicillin, cefuroxime, tetracycline); and surgical wound infection. Left hip wound cx 06/27: +PSA (I-aztreonam), +Staph hemolyticus (R-tetracycline, S-vanc), +E.faecalis (panS), +proteus vulgaris (R-ampicillin, cefuroxime, Tetracycline). Pt underwent surgical debridement (no Op notes available) 07/01, surgical cxs NG.  Wound swab cxs 07/01 No growth. Also, records reveal ESBL-e.coli UTI 06/25. It seems pt has been maintained on Iv-Erta since then, despite not covering all bacteria isolated on cxs from infected surgical wound; and per family, pt was to complete prolonged course Erta ~6wks, with impending BRENDAN. -- Pt transferred to OC from [facility] for further work-up of unwitnessed seizure, and anemia. Unclear what provoked seizure. EEG was negative.    Ct-a/p and Ct-hip reveal stable hardware, but note non-specific soft-tissue fluid collection at lateral hip measuring 9.4 x 8.7 x 4.8cm, as well as IM hematoma of left iliopsoas, psoas, and iliacus muscle, with potential active hemorrhage.    ID was consulted as "Wound infection. Was on ertapenem at nursing home. Should this be continued" On 08/09, switched erta to merrem while " awaiting additional records.    Pt AF, VSS, HDS, H/H stable, wbc nml. Procalc 0.26, . Exam of left hip w/o local s/sxs of infection. Skin intact, incision well healed. Pt maintained on merrem. Tolerating well w/o issue.     Recommendations:  CRP markedly elevated 290. Recommend NM bone scan to r/o osteo.   Consider ortho surgery and/or IR consult for eval and to consider fluid aspiration to further guide antibiotic therapy  Carbapenems have low risk lowering seizure threshold, so recommend very close monitoring.   Will need further records in order to give proper recommendation, specifically [facility] records including infectious disease and ortho surgery notes, as well as culture reports with sensitivities.       -- Discussed with ID staff and primary team   -- ID will continue to follow w/ further recs.            Thank you for your consult. I will follow-up with patient. Please contact us if you have any additional questions.    Bryn Alcantara PA-C  Infectious Disease  [facility] - Neurosurgery (Hospital)    Subjective:     Principal Problem:Seizure-like activity    HPI: Ms. Clemente is a 85 yo female with PMH of left femoral neck fracture, s/p left bipolar hemiarthroplasty on 6/4/24, paroxysmal atrial fibrillation (on Xarelto), hypothyroidism, dementia, and hyperlipidemia admitted to [facility] on August 4 with low hemoglobin. While at OSH, work up was nonrevealing for source of anemia, pt had reported tarry stools, though stool hemoccult was negative. Pt reported swelling around her left hip, and CT left hip noted soft tissue swelling/fluid in deep left pelvis, presacral region. Pt was also treated empirically for cysitits with cipro. During a CT of her AP, pt had a witnessed seizure that required keppra. Neurology was consulted and MRI brain and EEG without acute findings. While at OSH, ortho surgery and interventional radiology was consulted at [facility] and surgical  "intervention was not planned, though recommended to continue close monitoring of H/H and if continues to drop recommending CTA of abdomen/pelvis to see if IR intervention is indicated. Ultimately, pt was transferred to [facility] for EEG, neurology eval 2/2 new onset seizures, as well as close monitoring in H/H.     Prior to admission to [place], pt was sent to [facility] as there was concerns for left hip pain, redness, and swelling. Per limited records available, appears pt was bacteremic with proteus vulgaris on 6/24/24, also with Ecoli ESBL uti. It appears Dr. Dave Nascimento was following pt. Per ED notes on 7/25/24 from [facility], pt was noted that "pt underwent left hip debridement for post op left hip infection on July 1st, she had proteus in her tissues, culture later was positive for pseudomonas, staph hemolyticus, and enterococcus. Pt was only on rocephin, though it was planned to continue cefepime,  however it was discontinued and pt was started on rocephin alone."     ID was consulted d/t "Wound infection. Was on ertapenem at nursing home. Should this be continued"        Interval History: Pt remains AF, VSS, Hds, wbc nml. Procalc 0.26. . No TTP to Left hip. Skin intact, no local signs of infection. Pt on merrem empirically.     Review of Systems   Constitutional:  Negative for chills, diaphoresis, fatigue and fever.   HENT: Negative.     Respiratory:  Negative for cough and shortness of breath.    Cardiovascular:  Negative for leg swelling.   Gastrointestinal:  Negative for blood in stool, diarrhea, nausea and vomiting.   Genitourinary:  Negative for difficulty urinating, dysuria, flank pain and pelvic pain.   Skin:  Negative for rash and wound.   Psychiatric/Behavioral:  Positive for confusion (though oriented). Negative for agitation.      Objective:     Vital Signs (Most Recent):  Temp: 97.5 °F (36.4 °C) (08/10/24 1201)  Pulse: 73 (08/10/24 1201)  Resp: 18 (08/10/24 1201)  BP: (!) " 165/70 (08/10/24 1201)  SpO2: 97 % (08/10/24 1201) Vital Signs (24h Range):  Temp:  [97.5 °F (36.4 °C)-98.3 °F (36.8 °C)] 97.5 °F (36.4 °C)  Pulse:  [63-82] 73  Resp:  [18-24] 18  SpO2:  [96 %-97 %] 97 %  BP: (136-198)/(70-95) 165/70     Weight: 81 kg (178 lb 9.2 oz)  Body mass index is 27.97 kg/m².    Estimated Creatinine Clearance: 35.9 mL/min (based on SCr of 1.3 mg/dL).     Physical Exam  Vitals reviewed.   Constitutional:       General: She is not in acute distress.     Appearance: Normal appearance. She is not ill-appearing.   HENT:      Head: Normocephalic.      Nose: Nose normal.      Mouth/Throat:      Mouth: Mucous membranes are moist.      Pharynx: Oropharynx is clear.   Eyes:      General:         Right eye: No discharge.         Left eye: No discharge.      Conjunctiva/sclera: Conjunctivae normal.   Cardiovascular:      Rate and Rhythm: Normal rate and regular rhythm.      Pulses: Normal pulses.      Heart sounds: Normal heart sounds. No murmur heard.  Pulmonary:      Effort: Pulmonary effort is normal. No respiratory distress.      Breath sounds: Normal breath sounds. No stridor.   Abdominal:      General: Abdomen is flat. There is no distension.      Palpations: Abdomen is soft.      Tenderness: There is no abdominal tenderness. There is no right CVA tenderness or left CVA tenderness.   Musculoskeletal:         General: Normal range of motion.      Cervical back: Normal range of motion.      Right lower leg: No edema.      Left lower leg: No edema.   Skin:     Findings: No lesion or rash.      Comments: With healed left hip incision, without surrounding swelling, redness. Without tenderness to palpation. Without drainage or underlying fluctuance.    Neurological:      Mental Status: She is alert and oriented to person, place, and time.      Comments: Mumbling speech, with intermittent confusion, but overall, alert and oriented x4    Psychiatric:         Mood and Affect: Mood normal.           Significant Labs: Blood Culture:   Recent Labs   Lab 08/04/24  1135 08/04/24  1202 08/07/24  1007   LABBLOO No growth after 5 days. No growth after 5 days. No Growth to date  No Growth to date  No Growth to date  No Growth to date     CBC:   Recent Labs   Lab 08/08/24 2014 08/09/24  0018 08/09/24  0413   WBC  --   --  11.45   HGB  --   --  10.2*   HCT 29* 31* 31.8*   PLT  --   --  245     CMP:   Recent Labs   Lab 08/09/24  0413      K 3.9      CO2 24   *   BUN 16   CREATININE 1.3   CALCIUM 8.8   PROT 9.5*   ALBUMIN 2.6*   BILITOT 0.9   ALKPHOS 111   AST 26   ALT 11   ANIONGAP 8     Microbiology Results (last 7 days)       Procedure Component Value Units Date/Time    Blood culture [[id number]] Collected: 08/07/24 1007    Order Status: Completed Specimen: Blood Updated: 08/10/24 1212     Blood Culture, Routine No Growth to date      No Growth to date      No Growth to date      No Growth to date    Narrative:      Picc line          Urine Culture:   Recent Labs   Lab 08/04/24  1452   LABURIN ENTEROCOCCUS FAECIUM VRE  50,000 - 99,999 cfu/ml  *     Urine Studies:   Recent Labs   Lab 08/04/24  1452 08/09/24  0919   COLORU Yellow Yellow   APPEARANCEUA Clear Clear   PHUR 7.0 7.0   SPECGRAV 1.015 1.015   PROTEINUA 2+* 2+*   GLUCUA Negative Negative   KETONESU Negative Negative   BILIRUBINUA Negative Negative   OCCULTUA Trace* Trace*   NITRITE Negative Negative   UROBILINOGEN Negative  --    LEUKOCYTESUR Trace* Negative   RBCUA 4 3   WBCUA 8* 2   BACTERIA Many* Rare   SQUAMEPITHEL 4  --    HYALINECASTS 0 3*     All pertinent labs within the past 24 hours have been reviewed.    Significant Imaging: I have reviewed all pertinent imaging results/findings within the past 24 hours.    I have personally reviewed records / hospital notes from   service and other specialty providers. I have also reviewed CBC, CMP/BMP,  cultures and imaging with my interpretation as documented in my assessment /  plan.    Patient is high risk for infectious complications given pt's age, multiple co-morbidities, and case complexity.      Time: 50 minutes   50% of time spent on face-to-face counseling and coordination of care. Counseling included review of test results, diagnosis, and treatment plan with patient and/or family.

## 2024-08-11 NOTE — PT/OT/SLP PROGRESS
Occupational Therapy   Treatment    Name: Shaye Clemente  MRN: [id number]  Admitting Diagnosis:  Seizure-like activity       Recommendations:     Discharge Recommendations: Moderate Intensity Therapy  Discharge Equipment Recommendations:  bedside commode, shower chair, grab bar, walker, rolling  Barriers to discharge:  Other (Comment) (increased skilled assist needed)    Assessment:     Shaye Clemente is a 84 y.o. female with a medical diagnosis of Seizure-like activity.  She presents with the following performance deficits affecting function: weakness, impaired endurance, impaired self care skills, impaired functional mobility, gait instability, impaired balance, decreased coordination, decreased upper extremity function, decreased lower extremity function, decreased safety awareness, pain, decreased ROM, impaired fine motor, edema, impaired cardiopulmonary response to activity. Pt agreeable to session. Pt perseverating on LLE/hip pain throughout session which limited her optimal participation in therapeutic activities. Pt BP monitored throughout session secondary to high BP value prior to session (197/84). Pt received medication before session and BP values during session were 187/77 (HOB elevated, supine) and 160/73 once EOB. No c/o dizziness with change of position. Pt demonstrating decreased activity tolerance and safety awareness with bed mobility and EOB activities, putting her at risk for falls with functional transfers and ambulation. Pt would continue to benefit from skilled OT services in the acute care setting to improve her activity tolerance, promote increased participation in ADL routines, and to facilitate a return to PLOF and least restrictive home environment.     Rehab Prognosis:  Good; patient would benefit from acute skilled OT services to address these deficits and reach maximum level of function.       Plan:     Patient to be seen 4 x/week to address the above listed problems via self-care/home  "management, therapeutic activities, therapeutic exercises, neuromuscular re-education  Plan of Care Expires: 09/07/24  Plan of Care Reviewed with: patient    Subjective     Chief Complaint: pain in LLE  Patient/Family Comments/goals: "I just need a sip of water."  Pain/Comfort:  Pain Rating 1: 5/10  Location - Side 1: Bilateral  Location - Orientation 1: generalized  Location 1: back  Pain Addressed 1: Reposition, Distraction, Cessation of Activity  Pain Rating 2: other (see comments) (pt c/o LLE pain throughout session, sitting EOB (did not rate))  Location - Side 2: Left  Location - Orientation 2: proximal  Location 2: hip  Pain Addressed 2: Reposition, Distraction, Cessation of Activity, Nurse notified    Objective:     Communicated with: Nursing prior to session.  Patient found HOB elevated with peripheral IV, blood pressure cuff, schreiber catheter upon OT entry to room.    General Precautions: Standard, fall, seizure, NPO    Orthopedic Precautions:N/A  Braces: N/A  Respiratory Status: Nasal cannula, flow 2 L/min     Occupational Performance:     Bed Mobility:    Patient completed Rolling/Turning to Left with  moderate assistance and 2 persons  Patient completed Rolling/Turning to Right with moderate assistance and 2 persons  Patient completed Scooting/Bridging with maximal assistance  Patient completed Supine to Sit with moderate assistance and 2 persons  Patient completed Sit to Supine with moderate assistance and 2 persons     Functional Mobility/Transfers:  Functional Mobility: Pt able to tolerate sitting EOB for ~15 minutes with CGA with moments of min assist required during dynamic sitting tasks and eventually pt fatigue. Pt demonstrating posterior lean and stooped posturing - requiring verbal cues to promote upright posturing and WB through BUEs to compensate for poor trunk control, sitting balance.     Activities of Daily Living:  Lower Body Dressing: maximal assistance - pt initially attempting to don " "hospital sock but eventually requesting OT don socks to decreased FMC. OT donned B socks to promote energy conservation for productive participation in EOB tasks.  Toileting: dependence - schreiber catheter      [facility] 6 Click ADL: 15    Treatment & Education:  Pt participated in BLE ankle pumps, LAQs, and marches (AAROM LLE) x10 repetitions. BUE therapeutic exercises initiated; however, pt increasingly c/o LLE pain in sitting - x8 repetitions of PROM LUE shoulder flexion performed before returning to supine. Pt instructed to perform BUE therapeutic exercises (shoulder flexion, bicep curls, wrist flexion/extension, and digit flexion/extension to maintain BUE functional strength while in the acute care setting - pt verbalized understanding. Pt perseverating on "needing a drink of water" throughout session - provided education on NPO status and importance of adhering until she is cleared by her MD.     Patient left HOB elevated with all lines intact and call button in reach    GOALS:   Multidisciplinary Problems       Occupational Therapy Goals          Problem: Occupational Therapy    Goal Priority Disciplines Outcome Interventions   Occupational Therapy Goal     OT, PT/OT Progressing    Description: Goals to be met by: 09/07/2024     Patient will increase functional independence with ADLs by performing:    LE Dressing with Minimal Assistance  to lucie socks  Grooming while standing at sink with Contact Guard Assistance.  Toileting from toilet with Contact Guard Assistance for hygiene and clothing management.   Step transfer with Contact Guard Assistance  Toilet transfer to toilet with Contact Guard Assistance.                         Time Tracking:     OT Date of Treatment: 08/11/24  OT Start Time: 0955  OT Stop Time: 1022  OT Total Time (min): 27 min    Billable Minutes:Therapeutic Activity 13 minutes  Neuromuscular Re-education 14 minutes    OT/THOM: OT          8/11/2024  "

## 2024-08-12 LAB
BACTERIA BLD CULT: NORMAL
ERYTHROCYTE [SEDIMENTATION RATE] IN BLOOD BY PHOTOMETRIC METHOD: 111 MM/HR (ref 0–36)
POCT GLUCOSE: 108 MG/DL (ref 70–110)

## 2024-08-12 PROCEDURE — 99233 SBSQ HOSP IP/OBS HIGH 50: CPT | Mod: ,,, | Performed by: PHYSICIAN ASSISTANT

## 2024-08-12 PROCEDURE — 25000003 PHARM REV CODE 250: Performed by: HOSPITALIST

## 2024-08-12 PROCEDURE — 36415 COLL VENOUS BLD VENIPUNCTURE: CPT

## 2024-08-12 PROCEDURE — 25000003 PHARM REV CODE 250: Performed by: STUDENT IN AN ORGANIZED HEALTH CARE EDUCATION/TRAINING PROGRAM

## 2024-08-12 PROCEDURE — 85652 RBC SED RATE AUTOMATED: CPT

## 2024-08-12 PROCEDURE — 63600175 PHARM REV CODE 636 W HCPCS: Performed by: STUDENT IN AN ORGANIZED HEALTH CARE EDUCATION/TRAINING PROGRAM

## 2024-08-12 PROCEDURE — 92526 ORAL FUNCTION THERAPY: CPT

## 2024-08-12 PROCEDURE — 27000207 HC ISOLATION

## 2024-08-12 PROCEDURE — 11000001 HC ACUTE MED/SURG PRIVATE ROOM

## 2024-08-12 PROCEDURE — 97535 SELF CARE MNGMENT TRAINING: CPT

## 2024-08-12 PROCEDURE — 63600175 PHARM REV CODE 636 W HCPCS: Performed by: HOSPITALIST

## 2024-08-12 PROCEDURE — 94761 N-INVAS EAR/PLS OXIMETRY MLT: CPT

## 2024-08-12 RX ORDER — LEVETIRACETAM 500 MG/1
500 TABLET ORAL 2 TIMES DAILY
Status: DISCONTINUED | OUTPATIENT
Start: 2024-08-12 | End: 2024-08-20 | Stop reason: HOSPADM

## 2024-08-12 RX ADMIN — MONTELUKAST 10 MG: 10 TABLET, FILM COATED ORAL at 08:08

## 2024-08-12 RX ADMIN — HYDRALAZINE HYDROCHLORIDE 5 MG: 20 INJECTION, SOLUTION INTRAMUSCULAR; INTRAVENOUS at 06:08

## 2024-08-12 RX ADMIN — FERROUS SULFATE TAB EC 325 MG (65 MG FE EQUIVALENT) 1 EACH: 325 (65 FE) TABLET DELAYED RESPONSE at 10:08

## 2024-08-12 RX ADMIN — ACETAMINOPHEN 650 MG: 325 TABLET ORAL at 10:08

## 2024-08-12 RX ADMIN — CARVEDILOL 12.5 MG: 12.5 TABLET, FILM COATED ORAL at 08:08

## 2024-08-12 RX ADMIN — ATORVASTATIN CALCIUM 40 MG: 40 TABLET, FILM COATED ORAL at 08:08

## 2024-08-12 RX ADMIN — DOCUSATE SODIUM 100 MG: 100 CAPSULE, LIQUID FILLED ORAL at 08:08

## 2024-08-12 RX ADMIN — LEVOTHYROXINE SODIUM 25 MCG: 25 TABLET ORAL at 06:08

## 2024-08-12 RX ADMIN — DOCUSATE SODIUM 100 MG: 100 CAPSULE, LIQUID FILLED ORAL at 10:08

## 2024-08-12 RX ADMIN — LEVETIRACETAM 500 MG: 500 TABLET, FILM COATED ORAL at 08:08

## 2024-08-12 RX ADMIN — MEROPENEM 2 G: 2 INJECTION, POWDER, FOR SOLUTION INTRAVENOUS at 06:08

## 2024-08-12 RX ADMIN — POLYETHYLENE GLYCOL 3350 17 G: 17 POWDER, FOR SOLUTION ORAL at 10:08

## 2024-08-12 RX ADMIN — CARVEDILOL 12.5 MG: 12.5 TABLET, FILM COATED ORAL at 10:08

## 2024-08-12 RX ADMIN — LEVETIRACETAM 500 MG: 100 INJECTION INTRAVENOUS at 10:08

## 2024-08-12 RX ADMIN — MEMANTINE 5 MG: 5 TABLET ORAL at 10:08

## 2024-08-12 RX ADMIN — MEMANTINE 5 MG: 5 TABLET ORAL at 08:08

## 2024-08-12 NOTE — CONSULTS
Radiology Consult      SUBJECTIVE:     Chief Complaint: anemia    History of Present Illness:  Shaye Clemente is a 84 y.o. female with PMHx including left hip hemiarthroplasty for closed comminuted femoral neck fx, paroxysmal afib (on Xarelto), hypothyroidism, dementia, and HLD who initially presented for anemia.    CT imaging 8/5/24 revealed subcutaneous fluid collection overlying the left hip as well as intramuscular hematoma of the left iliopsoas, psoas and iliacus muscles with potential active hemorrhage. IR consult was placed for possible drainage of subcutaneous fluid collection.      Past Medical History:   Diagnosis Date    Mumps without complication     Paroxysmal atrial fibrillation      Past Surgical History:   Procedure Laterality Date    BREAST SURGERY Right     partial    HEMIARTHROPLASTY, HIP, POSTERIOR APPROACH Left 06/04/2024    HYSTERECTOMY      TONSILLECTOMY      VAGINECTOMY         Home Meds:   Prior to Admission medications    Medication Sig Start Date End Date Taking? Authorizing Provider   0.9% NaCl PgBk 100 mL with ertapenem 1 gram SolR 1 g Inject 1 g into the vein once daily.  8/12/24  Provider, Historical   albuterol-ipratropium (DUO-NEB) 2.5 mg-0.5 mg/3 mL nebulizer solution Take 3 mLs by nebulization every 3 (three) hours as needed for Wheezing. Rescue    Provider, Historical   amino acids-protein hydrolys (PRO-STAT AW)  gram-kcal/30 mL Liqd Take 30 mLs by mouth 2 (two) times a day. For wound healing.    Provider, Historical   betamethasone valerate 0.1% (VALISONE) 0.1 % Crea Apply 0.1 % topically 2 (two) times daily. Apply to posterior trunk/back    Provider, Historical   CALCIUM CARBONATE ORAL Take 1 tablet by mouth 2 (two) times daily as needed for Heartburn.    Provider, Historical   diphenhydrAMINE (BENADRYL) 25 mg capsule Take 25 mg by mouth every 6 (six) hours as needed for Itching.    Provider, Historical   docusate sodium (COLACE) 100 MG capsule Take 100 mg by mouth 2 (two)  times daily.    Provider, Historical   DULoxetine (CYMBALTA) 30 MG capsule Take 30 mg by mouth 2 (two) times daily.    Provider, Historical   ferrous sulfate (FEOSOL) 325 mg (65 mg iron) Tab tablet Take 325 mg by mouth daily with breakfast.    Provider, Historical   HYDROcodone-acetaminophen (NORCO) 5-325 mg per tablet Take 1 tablet by mouth every 6 (six) hours as needed for Pain.    Provider, Historical   ibandronate (BONIVA) 150 mg tablet Take 150 mg by mouth every 30 days. 7/15/24   Provider, Historical   levothyroxine (SYNTHROID) 25 MCG tablet Take 25 mcg by mouth before breakfast.    Provider, Historical   memantine (NAMENDA) 10 MG Tab Take 10 mg by mouth 2 (two) times daily.    Provider, Historical   metoprolol succinate 50 mg CSpX Take 50 mg by mouth once daily.    Provider, Historical   montelukast (SINGULAIR) 10 mg tablet Take 10 mg by mouth every evening.    Provider, Historical   polyethylene glycol (GLYCOLAX) 17 gram/dose powder Take 17 g by mouth once daily.    Provider, Historical   rivaroxaban (XARELTO) 20 mg Tab Take 20 mg by mouth daily with dinner or evening meal.    Provider, Historical   rosuvastatin (CRESTOR) 10 MG tablet Take 10 mg by mouth once daily.    Provider, Historical   Saccharomyces boulardii (FLORASTOR) 250 mg capsule Take 250 mg by mouth 2 (two) times daily.    Provider, Historical   tiotropium-olodateroL (STIOLTO RESPIMAT) 2.5-2.5 mcg/actuation Mist Inhale 2 puffs into the lungs Daily. Controller    Provider, Historical     Anticoagulants/Antiplatelets: no anticoagulation    Allergies:   Review of patient's allergies indicates:   Allergen Reactions    Prilosec [omeprazole]     Augmentin [amoxicillin-pot clavulanate] Nausea And Vomiting    Codeine Rash    Sulfa (sulfonamide antibiotics) Rash           OBJECTIVE:     Vital Signs (Most Recent)  Temp: 98.3 °F (36.8 °C) (08/12/24 1540)  Pulse: 72 (08/12/24 1540)  Resp: 18 (08/12/24 1540)  BP: (!) 174/77 (08/12/24 1540)  SpO2: (!) 93 %  (08/12/24 1540)      Laboratory  Lab Results   Component Value Date    INR 1.4 (H) 08/07/2024       Lab Results   Component Value Date    WBC 11.45 08/09/2024    HGB 10.2 (L) 08/09/2024    HCT 31.8 (L) 08/09/2024    MCV 97 08/09/2024     08/09/2024      Lab Results   Component Value Date     (H) 08/09/2024     08/09/2024    K 3.9 08/09/2024     08/09/2024    CO2 24 08/09/2024    BUN 16 08/09/2024    CREATININE 1.3 08/09/2024    CALCIUM 8.8 08/09/2024    MG 1.8 08/09/2024    ALT 11 08/09/2024    AST 26 08/09/2024    ALBUMIN 2.6 (L) 08/09/2024    BILITOT 0.9 08/09/2024       ASSESSMENT/PLAN:     Medical chart and imaging studies were reviewed. Recommend obtaining repeat CT imaging to re-evaluate fluid collections. Will provide further recommendations once CT images are available.    Benjy Alberts MD PGY-2  Department of Radiology  [facility]

## 2024-08-12 NOTE — HPI
84F with h/o pAFib (on xarelto), dementia, femoral neck fracture, s/p left bipolar hemiarthroplasty with hardware placed on 6/4/24 at [facility]. (Minimal records available at this time). Hosp course c/b bacteremia 06/24 1 of 2 bottles +proteus vulgaris (R-ampicillin, cefuroxime, tetracycline); and surgical wound infection. Left hip wound cx 06/27: +PSA (I-aztreonam), +Staph hemolyticus (R-tetracycline, S-vanc), +E.faecalis (panS), +proteus vulgaris (R-ampicillin, cefuroxime, Tetracycline). Pt underwent surgical debridement (no Op notes available) 07/01, surgical cxs NG.  Wound swab cxs 07/01 No growth. Also, records reveal ESBL-e.coli UTI 06/25. It seems pt has been maintained on Iv-Erta since then, despite not covering all bacteria isolated on cxs from infected surgical wound; and per family, pt was to complete prolonged course Erta ~6wks, with impending BRENDAN. -- Pt transferred to [facility] from [facility] for further work-up of unwitnessed seizure, and anemia. Unclear what provoked seizure. EEG was negative.  Ct-a/p and Ct-hip reveal stable hardware, but note non-specific soft-tissue fluid collection at lateral hip measuring 9.4 x 8.7 x 4.8cm, as well as IM hematoma of left iliopsoas, psoas, and iliacus muscle, with potential active hemorrhage    Orthopedic surgery was consulted for evaluation of left hip pain. Pt was seen and evaluated at bedside. She reported that hip pain started after a GLF 2 months ago. Prior to this, she was ambulatory without assistance at baseline. She had a hemiarthroplasty at a hospital in [place] after the fall. Chart review revealed that she became bacteremic and had positive surgical site infection after wards which was treated with antibiotics and one trip to the OR for I&D. Unable to determine if there was hardware removal vs exchange during this I&D due to unavailability of medical record from previous hospital.  She has been nonweightbearing since the index surgery 2  months ago. She reports left hip pain worse when she rolls in bed. She denied numbness or tingling traveling to her foot. She has no other msk symptoms. CT imaging revealed subcutaneous fluid present adjacent to the greater troch of left hip and possible fluid collection along the iliopsoas that is likely from a hematoma. She was transferred to [facility] for evaluation of seizure by neurology.

## 2024-08-12 NOTE — PROGRESS NOTES
[facility]arg[facility] - Neurosurgery ([facility])  [facility] Medicine  Progress Note    Patient Name: Shaye Clemente  MRN: [id number]  Patient Class: IP- Inpatient   Admission Date: 8/6/2024  Length of Stay: 6 days  Attending Physician: Traci Mancini MD  Primary Care Provider: Skip Singh MD        Subjective:     Principal Problem:Seizure-like activity        HPI:  84-year-old female with a left hip hemiarthroplasty in June (by report had hemiarthroplasty of closed comminuted femoral neck hip fracture), paroxysmal atrial fibrillation (on Xarelto), hypothyroidism, dementia, and hyperlipidemia admitted to [facility] on August 4 with low hemoglobin.  Patient reported tarry stools for several days, but Hemoccult was negative in the emergency department.  She also noted discomfort and swelling around her left hip.  Labs in the emergency department were concerning for anemia with hemoglobin 6.5.  There was also concern for possible cystitis, and she was placed on ciprofloxacin.  CT of the head had no acute abnormalities, and CT of the left hip showed soft tissue fullness and fluid in the deep left pelvis and presacral region.  Initial EKG showed sinus rhythm.  She was transfused 2 units packed red blood cells and admitted to [facility].  With transfusion, hemoglobin increased to 9.3.  She was confused during her stay.  She was taken for CT of the abdomen and pelvis on the morning of August 5 when she began to have seizure activity.  She had fluttering of her eyes and movement of her head along with dorsiflexion of both feet.  She was given Keppra and placed on twice daily Keppra.  MRI brain had no acute intracranial abnormality.     She underwent Neurology tele-consultation, and recommendations included additional Keppra along with Q 4 hour neuro checks and routine EEG.  She was lethargic and required repeated stimulation for her to participate during examination.  She was oriented to person and time.  She was able  to follow simple commands.  Mentation appears to be slowly improving.      She underwent noncontrasted CT of the abdomen and pelvis that showed a subcutaneous fluid collection overlying the left hip which may represent resolving hematoma or postsurgical seroma.  There were also findings suspicious for intramuscular hematoma in the left iliopsoas, psoas, and iliacus.  Transfer center spoke with Orthopedic Surgery at [facility].  At present, there does not appear to be surgical intervention indicated in the left hip.  Case also discussed with Interventional Radiology at [facility].  Monitor the trend in hemoglobin and hemodynamic status.  If she remains stable, continue monitoring.  If she has a significant drop in hemoglobin or show signs of hemodynamic instability, she may need CTA of the abdomen and pelvis (bleed protocol) to determine if IR intervention is indicated. Pt. Transferred to[facility]ospital Medicine at [facility] in step-down status for EEG and Neurology evaluation of new onset seizures as well as monitoring the trend in her hemoglobin.     August 5:  Sodium 135, potassium 3.2, chloride 104, CO2 23, BUN 12, creatinine 1.7, glucose 84, calcium 8.4, magnesium 1.4, phosphorus 3.2, AST 20, ALT, white blood cells 7.13, hemoglobin 9.3, hematocrit 27.9, platelets 242  -repeat CBC on the afternoon of August 5 had hemoglobin 10.7 and hematocrit 32.7 (improved from earlier).  -CT abdomen and pelvis without contrast noted small hypoattenuating nodule in the right hepatic lobe may represent a cyst.  Diverticulosis.  Recent right total left hip arthroplasty with poorly defined subcutaneous fluid collection overlying the left hip which may represent resolving hematoma or postsurgical seroma.  Postsurgical abscess not excludable.  Findings suspicious for intramuscular hematoma of the left iliopsoas, psoas, and iliacus muscles with potential active hemorrhage.  -MRI brain was motion limited.  No acute  intracranial abnormality.  Cortical atrophy with periventricular deep white matter change consistent with chronic small-vessel ischemic disease.     August 4: Blood cultures with no growth to date, INR 1.8, iron saturation 19%, stool for occult blood negative, lactic acid 0.7, hemoglobin 6.7, hematocrit 20.5, procalcitonin 0.26  -urinalysis with 2+ protein, trace blood, trace leukocytes, 4 RBC, 8 WBC, many bacteria  -chest x-ray had no acute cardiopulmonary process identified  -CT head had no hemorrhage or other acute intracranial CT findings.  -CT left hip showed nonspecific soft tissue fullness to lateral hip measuring 9.4 x 8.7 x 4.8 cm.  Partially imaged slightly hyperattenuating fluid in the left deep pelvis and presacral region which may represent blood products.  Postsurgical changes of left hip arthroplasty.  -EKG showed normal sinus rhythm and appeared to be fairly normal.    Overview/Hospital Course:  Patient maintained stable hemoglobin since admission to [facility].  Working well with speech therapy, tolerating bite size soft diet.  TSH noted to be markedly elevated up to 19, started on Synthroid    Interval History:   8/12: Patient notes a little bit of Left leg pain.     Review of Systems   Constitutional:  Negative for activity change, appetite change, chills, fever and unexpected weight change.   HENT:  Negative for congestion and sore throat.    Respiratory:  Negative for cough and shortness of breath.    Cardiovascular:  Negative for chest pain, palpitations and leg swelling.   Gastrointestinal:  Negative for abdominal distention, abdominal pain, blood in stool, constipation, diarrhea, nausea and vomiting.   Genitourinary:  Negative for difficulty urinating, dysuria and hematuria.   Musculoskeletal:  Negative for arthralgias and myalgias.   Skin:  Negative for color change and rash.   Neurological:  Negative for dizziness, tremors and seizures.   Psychiatric/Behavioral:  Positive for confusion.       Objective:     Vital Signs (Most Recent):  Temp: 97.6 °F (36.4 °C) (08/12/24 0736)  Pulse: 82 (08/12/24 0736)  Resp: 18 (08/12/24 0736)  BP: (!) 142/90 (08/12/24 0736)  SpO2: 97 % (08/12/24 0854) Vital Signs (24h Range):  Temp:  [97.6 °F (36.4 °C)-98.7 °F (37.1 °C)] 97.6 °F (36.4 °C)  Pulse:  [75-95] 82  Resp:  [16-18] 18  SpO2:  [93 %-97 %] 97 %  BP: (142-191)/(70-90) 142/90     Weight: 82.5 kg (181 lb 14.1 oz)  Body mass index is 28.49 kg/m².    Intake/Output Summary (Last 24 hours) at 8/12/2024 1108  Last data filed at 8/11/2024 1823  Gross per 24 hour   Intake --   Output 600 ml   Net -600 ml         Physical Exam  Vitals reviewed.   Constitutional:       General: She is not in acute distress.     Appearance: She is well-developed.   HENT:      Head: Normocephalic and atraumatic.      Comments: EEG in place.   Eyes:      Extraocular Movements: Extraocular movements intact.      Pupils: Pupils are equal, round, and reactive to light.   Neck:      Vascular: No JVD.      Trachea: No tracheal deviation.   Cardiovascular:      Rate and Rhythm: Normal rate and regular rhythm.      Heart sounds: No murmur heard.     No friction rub. No gallop.   Pulmonary:      Effort: No respiratory distress.      Breath sounds: Normal breath sounds. No wheezing or rales.   Abdominal:      General: Bowel sounds are normal. There is no distension.      Palpations: Abdomen is soft. There is no mass.      Tenderness: There is no abdominal tenderness.   Musculoskeletal:         General: No deformity.      Cervical back: Neck supple.   Lymphadenopathy:      Cervical: No cervical adenopathy.   Skin:     General: Skin is warm and dry.      Findings: No rash.   Neurological:      Mental Status: She is alert. She is disoriented.      Cranial Nerves: Cranial nerves 2-12 are intact.      Deep Tendon Reflexes:      Reflex Scores:       Tricep reflexes are 2+ on the right side and 2+ on the left side.       Bicep reflexes are 2+ on the right  side and 2+ on the left side.       Patellar reflexes are 2+ on the right side and 2+ on the left side.     Comments: Alert to person, place in hospital and year but not date/month/circumstances behind hospitalization, mildly confused   Psychiatric:         Speech: Speech is slurred.             Significant Labs: All pertinent labs within the past 24 hours have been reviewed.    Significant Imaging: I have reviewed all pertinent imaging results/findings within the past 24 hours.    Assessment/Plan:      * Seizure-like activity  -Reported seizure at [facility] prior to transfer pt. Was post-ictal and remains confused  -MRI brain uremarkable  -EEG so far unremarkable for any seizure activity   -currently on Keppra 500, follow up Neurology recommendations   -seizure precautions, fall precautions   -neuro checks      Hypertension  Pressures noted to be in the 180s systolic   Continue home Toprol   Follow up with pharmacy and family regarding home medication list    Dementia  Neuro checks   Fall precautions   Seizure precautions  Memantine   Follow up with pharmacy consultation and family input for home medication list    Hypothyroidism  Starting Synthroid 88 mcg   Following up with a pharmacy consult for home medication reconciliation      LUIS CARLOS (acute kidney injury)  -unclear if baseline, creatinine initially elevated at 2.0 now improving down to 1.4,   -stopping IV fluids    Encephalopathy, metabolic  -Pt. With intermittent confusion, seizure activity. Mentation seems to be improving with time after seizure   -do not suspect true UTI as cause given very low colony count  -MRI brain negative for acute findings.   -TSH markedly elevated, see below for further treatment, could have been easily contributing factor  -Delirium precautions ordered    Hip hematoma, left  -Pt. Presented anemia, required 1 unit pRBC, Hgb now stable ~10. CT abdomen conerning for subcutaneous fluid collection overlying the left hip may  "represent resolving hematoma or postsurgical seroma andFindings suspicious for an intramuscular hematoma of the left iliopsoas, psoas and iliacus muscles with potential active hemorrhage.  -Case discussed with IR and ortho prior to transfer, plan "Monitor the trend in hemoglobin and hemodynamic status. If she remains stable, continue monitoring. If she has a significant drop in hemoglobin or show signs of hemodynamic instability, she may need CTA of the abdomen and pelvis (bleed protocol) to determine if IR intervention is indicated"          Paroxysmal atrial fibrillation  -Hx of pAfib. EKG on admit showed NSR, rhythm currently regular  -Continue home metoprolol and monitor on telemetry.  Holding Xarelto due to bleeding concerns-this was affirmed by the daughter via phone    Acute cystitis with hematuria  -appears to be Enterococcus, unclear if truly infectious given very low colony count  -switching from Levaquin over to Macrobid        VTE Risk Mitigation (From admission, onward)           Ordered     Place sequential compression device  Until discontinued         08/06/24 1811                    Discharge Planning   KATI: 8/13/2024     Code Status: DNR   Is the patient medically ready for discharge?:     Reason for patient still in hospital (select all that apply): Patient trending condition  Discharge Plan A: Skilled Nursing Facility                  Traci Mancini MD  Department of Hospital Medicine   [facility] - Neurosurgery ([facility])    "

## 2024-08-12 NOTE — PT/OT/SLP PROGRESS
"Speech Language Pathology Treatment    Patient Name:  Shaye Clemente   MRN:  [id number]  952/952 A    Admitting Diagnosis: Seizure-like activity    Recommendations:                 General Recommendations:  diet follow up and ongoing Amenyz-Xillfppx-Nughghrft therapy as appropriate  Diet recommendations:  Soft & Bite Sized Diet - IDDSI Level 6, Liquid Diet Level: Thin liquids - IDDSI Level 0   Aspiration Precautions:   1 small bite/sip at a time  Alternating bites/sips  Assistance with meals,  No straws  Meds crushed vs buried (meds should NOT be taken whole with liquids)  Feed only when awake/alert  HOB to 90 degrees  and Monitor for s/s of aspiration   General Precautions: Standard, aspiration  Communication strategies:   encourage increased volume, slow rate, and over articulation    Assessment:     Shaye Clemente is a 84 y.o. female with an SLP diagnosis of Dysphagia, Dysarthria, and Cognitive-Linguistic Impairment. Patient with increased tolerance of thin liquids. She continues to require assistance with meals. Patient with improving speech, language, and cognitive skills returning to (but not at) baseline per pt sister. ST will continue to monitor.      Subjective     RN present in room and assisted with repositioning patient.   Patient states, "I desperately want to go home."   Sister states, "She was drinking water with us the other day and doing fine."    Respiratory Status: nasal cannula    Objective:     Has the patient been evaluated by SLP for swallowing?   Yes  Keep patient NPO? No     Pt seen for ongoing diagnostic dysphagia therapy. Patient with improved LINDA and decreased wheezing sounds this date. Patient repositioned and HOB elevated. Patient assessed with clinician fed bites of apple sauce and sesar crackers and sips of water via cup and straw. Patient often dropping cups/utensils when attempting to feed self and noted to be impulsive with taking large, quick straw sips. Belching noted after consecutive " straw sips. No overt s/s of aspiration with all trials. No anterior loss of all trials. No wheezing or shortness of breath. Patient called sister on phone in room and SLP provided patient and family education on SLP recommendations, SLP role, s/s and risks of aspiration, safe swallow precautions, and POC. All questions addressed within SLP scope and patient/family v/u of all discussed. SLP communicated recommendations and sister's request for a MD/CM update today with RN, MD, and CM.         Goals:   Multidisciplinary Problems       SLP Goals          Problem: SLP    Goal Priority Disciplines Outcome   SLP Goal     SLP Progressing   Description: Speech Language Pathology Goals  Goals expected to be met by 8/15/24  1. Pt will tolerate least restrictive diet without overt S/S aspiration, Supervision  2. Pt will participate in full assessment of speech, language and cognition to determine ongoing POC needs -met  3. Educate Pt and family on aspiration precautions and SLP POC  4. Pt will independently verbalize/demonstrate 3 speech strategies to increase intelligibility to 75% or greater in known and unknown contexts.   5. Pt will demonstrate adequate topic maintenance and/or attend to therapy tasks for 1+ minutes given no cues to redirect.   6. Pt will answer safety awareness/problem solving questions with 75% acc min cues.   7. Pt will follow 2+ step directions with 75% acc given 1 repetition.   8. Pt will complete functional sequencing tasks with 75% acc min cues.                        Plan:     Patient to be seen:  3 x/week   Plan of Care expires:  09/06/24  Plan of Care reviewed with:  patient, sibling   SLP Follow-Up:  Yes       Discharge recommendations:  Low Intensity Therapy   Barriers to Discharge:  None    Time Tracking:     SLP Treatment Date:   08/12/24  Speech Start Time:  0727  Speech Stop Time:  0754     Speech Total Time (min):  27 min    Billable Minutes: Treatment Swallowing Dysfunction 10 and Self  Care/Home Management Training 17      08/12/2024

## 2024-08-12 NOTE — PT/OT/SLP PROGRESS
Physical Therapy  Continue Current Plan of Care     Patient Name:  Shaye Clemente   MRN:  [id number]  Admitting Diagnosis:  Seizure-like activity   Recent Surgery: * No surgery found *    Admit Date: 8/6/2024  Length of Stay: 6 days    Patient not seen on this date, however per chart review pt continues to benefit from acute PT services. PT to follow up as POC allows. Please continue progressive mobility as appropriate.    Will follow up: 8/13/24

## 2024-08-12 NOTE — SUBJECTIVE & OBJECTIVE
Interval History:   8/12: Patient notes a little bit of Left leg pain.     Review of Systems   Constitutional:  Negative for activity change, appetite change, chills, fever and unexpected weight change.   HENT:  Negative for congestion and sore throat.    Respiratory:  Negative for cough and shortness of breath.    Cardiovascular:  Negative for chest pain, palpitations and leg swelling.   Gastrointestinal:  Negative for abdominal distention, abdominal pain, blood in stool, constipation, diarrhea, nausea and vomiting.   Genitourinary:  Negative for difficulty urinating, dysuria and hematuria.   Musculoskeletal:  Negative for arthralgias and myalgias.   Skin:  Negative for color change and rash.   Neurological:  Negative for dizziness, tremors and seizures.   Psychiatric/Behavioral:  Positive for confusion.      Objective:     Vital Signs (Most Recent):  Temp: 97.6 °F (36.4 °C) (08/12/24 0736)  Pulse: 82 (08/12/24 0736)  Resp: 18 (08/12/24 0736)  BP: (!) 142/90 (08/12/24 0736)  SpO2: 97 % (08/12/24 0854) Vital Signs (24h Range):  Temp:  [97.6 °F (36.4 °C)-98.7 °F (37.1 °C)] 97.6 °F (36.4 °C)  Pulse:  [75-95] 82  Resp:  [16-18] 18  SpO2:  [93 %-97 %] 97 %  BP: (142-191)/(70-90) 142/90     Weight: 82.5 kg (181 lb 14.1 oz)  Body mass index is 28.49 kg/m².    Intake/Output Summary (Last 24 hours) at 8/12/2024 1108  Last data filed at 8/11/2024 1823  Gross per 24 hour   Intake --   Output 600 ml   Net -600 ml         Physical Exam  Vitals reviewed.   Constitutional:       General: She is not in acute distress.     Appearance: She is well-developed.   HENT:      Head: Normocephalic and atraumatic.      Comments: EEG in place.   Eyes:      Extraocular Movements: Extraocular movements intact.      Pupils: Pupils are equal, round, and reactive to light.   Neck:      Vascular: No JVD.      Trachea: No tracheal deviation.   Cardiovascular:      Rate and Rhythm: Normal rate and regular rhythm.      Heart sounds: No murmur  heard.     No friction rub. No gallop.   Pulmonary:      Effort: No respiratory distress.      Breath sounds: Normal breath sounds. No wheezing or rales.   Abdominal:      General: Bowel sounds are normal. There is no distension.      Palpations: Abdomen is soft. There is no mass.      Tenderness: There is no abdominal tenderness.   Musculoskeletal:         General: No deformity.      Cervical back: Neck supple.   Lymphadenopathy:      Cervical: No cervical adenopathy.   Skin:     General: Skin is warm and dry.      Findings: No rash.   Neurological:      Mental Status: She is alert. She is disoriented.      Cranial Nerves: Cranial nerves 2-12 are intact.      Deep Tendon Reflexes:      Reflex Scores:       Tricep reflexes are 2+ on the right side and 2+ on the left side.       Bicep reflexes are 2+ on the right side and 2+ on the left side.       Patellar reflexes are 2+ on the right side and 2+ on the left side.     Comments: Alert to person, place in hospital and year but not date/month/circumstances behind hospitalization, mildly confused   Psychiatric:         Speech: Speech is slurred.             Significant Labs: All pertinent labs within the past 24 hours have been reviewed.    Significant Imaging: I have reviewed all pertinent imaging results/findings within the past 24 hours.

## 2024-08-12 NOTE — PLAN OF CARE
[facility] (H[facility])  Discharge Reassessment    Primary Care Provider: Skip Singh MD    Expected Discharge Date: 8/14/2024    Reassessment (most recent)       Discharge Reassessment - 08/12/24 1241          Discharge Reassessment    Assessment Type Discharge Planning Reassessment     Did the patient's condition or plan change since previous assessment? Yes (P)      Discharge Plan discussed with: Sibling (P)      Name(s) and Number(s) Adriane (P)      Communicated KATI with patient/caregiver Yes (P)      Discharge Plan A Skilled Nursing Facility (P)      DME Needed Upon Discharge  none (P)      Why the patient remains in the hospital Requires continued medical care (P)         Post-Acute Status    Post-Acute Authorization Placement (P)      Post-Acute Placement Status Pending medical clearance/testing (P)                  Patient is not medically cleared.  ID following and pending final  recs.  Patient will discharge back to [facility] when med cleared.  They have received auth and it should be good till 8/15.  Patient and family have been updated by SW and physician.      Discharge Plan A and Plan B have been determined by review of patient's clinical status, future medical and therapeutic needs, and coverage/benefits for post-acute care in coordination with multidisciplinary team members.    Gladys Peterson, JESUS  [facility]  [phone]

## 2024-08-13 PROBLEM — M25.552 LEFT HIP PAIN: Status: ACTIVE | Noted: 2024-08-13

## 2024-08-13 LAB
POCT GLUCOSE: 83 MG/DL (ref 70–110)
POCT GLUCOSE: 85 MG/DL (ref 70–110)
POCT GLUCOSE: 86 MG/DL (ref 70–110)

## 2024-08-13 PROCEDURE — 25000003 PHARM REV CODE 250: Performed by: HOSPITALIST

## 2024-08-13 PROCEDURE — 99233 SBSQ HOSP IP/OBS HIGH 50: CPT | Mod: ,,, | Performed by: PHYSICIAN ASSISTANT

## 2024-08-13 PROCEDURE — 97530 THERAPEUTIC ACTIVITIES: CPT

## 2024-08-13 PROCEDURE — 97116 GAIT TRAINING THERAPY: CPT

## 2024-08-13 PROCEDURE — 25500020 PHARM REV CODE 255: Performed by: HOSPITALIST

## 2024-08-13 PROCEDURE — 27000207 HC ISOLATION

## 2024-08-13 PROCEDURE — 97535 SELF CARE MNGMENT TRAINING: CPT

## 2024-08-13 PROCEDURE — 63600175 PHARM REV CODE 636 W HCPCS: Performed by: STUDENT IN AN ORGANIZED HEALTH CARE EDUCATION/TRAINING PROGRAM

## 2024-08-13 PROCEDURE — 92507 TX SP LANG VOICE COMM INDIV: CPT

## 2024-08-13 PROCEDURE — 25000003 PHARM REV CODE 250: Performed by: STUDENT IN AN ORGANIZED HEALTH CARE EDUCATION/TRAINING PROGRAM

## 2024-08-13 PROCEDURE — 11000001 HC ACUTE MED/SURG PRIVATE ROOM

## 2024-08-13 RX ADMIN — MEMANTINE 5 MG: 5 TABLET ORAL at 08:08

## 2024-08-13 RX ADMIN — FERROUS SULFATE TAB EC 325 MG (65 MG FE EQUIVALENT) 1 EACH: 325 (65 FE) TABLET DELAYED RESPONSE at 08:08

## 2024-08-13 RX ADMIN — DOCUSATE SODIUM 100 MG: 100 CAPSULE, LIQUID FILLED ORAL at 08:08

## 2024-08-13 RX ADMIN — LEVOTHYROXINE SODIUM 25 MCG: 25 TABLET ORAL at 06:08

## 2024-08-13 RX ADMIN — LEVETIRACETAM 500 MG: 500 TABLET, FILM COATED ORAL at 08:08

## 2024-08-13 RX ADMIN — HYDROCODONE BITARTRATE AND ACETAMINOPHEN 1 TABLET: 5; 325 TABLET ORAL at 03:08

## 2024-08-13 RX ADMIN — IOHEXOL 75 ML: 350 INJECTION, SOLUTION INTRAVENOUS at 01:08

## 2024-08-13 RX ADMIN — MEROPENEM 2 G: 2 INJECTION, POWDER, FOR SOLUTION INTRAVENOUS at 11:08

## 2024-08-13 RX ADMIN — HYDRALAZINE HYDROCHLORIDE 25 MG: 25 TABLET ORAL at 04:08

## 2024-08-13 RX ADMIN — HYDROCODONE BITARTRATE AND ACETAMINOPHEN 1 TABLET: 5; 325 TABLET ORAL at 08:08

## 2024-08-13 RX ADMIN — CARVEDILOL 12.5 MG: 12.5 TABLET, FILM COATED ORAL at 08:08

## 2024-08-13 RX ADMIN — ATORVASTATIN CALCIUM 40 MG: 40 TABLET, FILM COATED ORAL at 08:08

## 2024-08-13 RX ADMIN — POLYETHYLENE GLYCOL 3350 17 G: 17 POWDER, FOR SOLUTION ORAL at 08:08

## 2024-08-13 RX ADMIN — MONTELUKAST 10 MG: 10 TABLET, FILM COATED ORAL at 08:08

## 2024-08-13 RX ADMIN — HYDRALAZINE HYDROCHLORIDE 25 MG: 25 TABLET ORAL at 06:08

## 2024-08-13 NOTE — SUBJECTIVE & OBJECTIVE
Interval History:   8/13: Working on plan concerning hip issue     Review of Systems   Constitutional:  Negative for activity change, appetite change, chills, fever and unexpected weight change.   HENT:  Negative for congestion and sore throat.    Respiratory:  Negative for cough and shortness of breath.    Cardiovascular:  Negative for chest pain, palpitations and leg swelling.   Gastrointestinal:  Negative for abdominal distention, abdominal pain, blood in stool, constipation, diarrhea, nausea and vomiting.   Genitourinary:  Negative for difficulty urinating, dysuria and hematuria.   Musculoskeletal:  Negative for arthralgias and myalgias.   Skin:  Negative for color change and rash.   Neurological:  Negative for dizziness, tremors and seizures.   Psychiatric/Behavioral:  Positive for confusion.      Objective:     Vital Signs (Most Recent):  Temp: 98 °F (36.7 °C) (08/13/24 1520)  Pulse: 71 (08/13/24 1520)  Resp: 18 (08/13/24 1520)  BP: (!) 192/87 (08/13/24 1520)  SpO2: 98 % (08/13/24 1520) Vital Signs (24h Range):  Temp:  [97.2 °F (36.2 °C)-98 °F (36.7 °C)] 98 °F (36.7 °C)  Pulse:  [68-75] 71  Resp:  [16-20] 18  SpO2:  [94 %-98 %] 98 %  BP: (161-195)/(55-89) 192/87     Weight: 82.5 kg (181 lb 14.1 oz)  Body mass index is 28.49 kg/m².  No intake or output data in the 24 hours ending 08/13/24 1728      Physical Exam  Vitals reviewed.   Constitutional:       General: She is not in acute distress.     Appearance: She is well-developed.   HENT:      Head: Normocephalic and atraumatic.      Comments: EEG in place.   Eyes:      Extraocular Movements: Extraocular movements intact.      Pupils: Pupils are equal, round, and reactive to light.   Neck:      Vascular: No JVD.      Trachea: No tracheal deviation.   Cardiovascular:      Rate and Rhythm: Normal rate and regular rhythm.      Heart sounds: No murmur heard.     No friction rub. No gallop.   Pulmonary:      Effort: No respiratory distress.      Breath sounds: Normal  breath sounds. No wheezing or rales.   Abdominal:      General: Bowel sounds are normal. There is no distension.      Palpations: Abdomen is soft. There is no mass.      Tenderness: There is no abdominal tenderness.   Musculoskeletal:         General: No deformity.      Cervical back: Neck supple.   Lymphadenopathy:      Cervical: No cervical adenopathy.   Skin:     General: Skin is warm and dry.      Findings: No rash.   Neurological:      Mental Status: She is alert. She is disoriented.      Cranial Nerves: Cranial nerves 2-12 are intact.      Deep Tendon Reflexes:      Reflex Scores:       Tricep reflexes are 2+ on the right side and 2+ on the left side.       Bicep reflexes are 2+ on the right side and 2+ on the left side.       Patellar reflexes are 2+ on the right side and 2+ on the left side.     Comments: Alert to person, place in hospital and year but not date/month/circumstances behind hospitalization, mildly confused   Psychiatric:         Speech: Speech is slurred.             Significant Labs: All pertinent labs within the past 24 hours have been reviewed.    Significant Imaging: I have reviewed all pertinent imaging results/findings within the past 24 hours.

## 2024-08-13 NOTE — NURSING
Nurses Note -- 4 Eyes      8/13/2024   4:41 AM      Skin assessed during: Q Shift Change      [x] No Altered Skin Integrity Present    []Prevention Measures Documented      [] Yes- Altered Skin Integrity Present or Discovered   [] LDA Added if Not in Epic (Describe Wound)   [] New Altered Skin Integrity was Present on Admit and Documented in LDA   [] Wound Image Taken    Wound Care Consulted? No    Attending Nurse:  Gustabo Gallegos RN/Staff Member:  Lynne

## 2024-08-13 NOTE — CARE UPDATE
I have reviewed the chart of Shaye Clemente who is hospitalized for the following:    Active Hospital Problems    Diagnosis    *Seizure-like activity    Left hip pain    Delirium    Wound infection    Acute hypoxemic respiratory failure     had an episode of emesis, followed by respiratory distress with tachypnea and hypoxia requiring around 5L NC to maintain sats.       Antibiotic long-term use    Post op infection    Chronic anticoagulation     On xarelto      Hypophosphatemia     Monitor with daily labs  replace      Hemorrhagic disorder due to circulating anticoagulants     Patient hip hematoma, was on xarelto  Evaluate by ortho no acute intervention       Hyponatremia     Na 135  Monitor with daily cmp  improving      Hypokalemia     K 3.2 POA  Monitor with daily cmp  replace      Hypothyroidism     On synthroid       Dementia    Hypertension    Hip hematoma, left    Encephalopathy, metabolic    LUIS CARLOS (acute kidney injury)    Paroxysmal atrial fibrillation    Acute cystitis with hematuria        Mikala Brar NP  Unit Based [facility]

## 2024-08-13 NOTE — PROGRESS NOTES
"Yossi Vargas - Neurosurgery ([facility])  Infectious Disease  Progress Note    Patient Name: Shaye Clemente  MRN: [id number]  Admission Date: 8/6/2024  Length of Stay: 7 days  Attending Physician: Traci Mancini MD  Primary Care Provider: Skip Singh MD    Isolation Status: Contact  Assessment/Plan:      ID  Post op infection  84F with h/o pAFib (on xarelto), dementia, femoral neck fracture, s/p left bipolar hemiarthroplasty with hardware placed on 6/4/24 at [facility]. (Minimal records available at this time). Hosp course c/b bacteremia 06/24 1 of 2 bottles +proteus vulgaris (R-ampicillin, cefuroxime, tetracycline); and surgical wound infection. Left hip wound cx 06/27: +PSA (I-aztreonam), +Staph hemolyticus (R-tetracycline, S-vanc), +E.faecalis (panS), +proteus vulgaris (R-ampicillin, cefuroxime, Tetracycline). Pt underwent surgical debridement (no Op notes available) 07/01, surgical cxs NG.  Wound swab cxs 07/01 No growth. Also, records reveal ESBL-e.coli UTI 06/25. It seems pt has been maintained on Iv-Erta since then, despite not covering all bacteria isolated on cxs from infected surgical wound; and per family, pt was to complete prolonged course Erta ~6wks, with impending BRENDAN. -- Pt transferred to [facility] from [facility] for further work-up of unwitnessed seizure, and anemia. Unclear what provoked seizure. EEG was negative.    Ct-a/p and Ct-hip reveal stable hardware, but note non-specific soft-tissue fluid collection at lateral hip measuring 9.4 x 8.7 x 4.8cm, as well as IM hematoma of left iliopsoas, psoas, and iliacus muscle, with potential active hemorrhage.    ID was consulted as "Wound infection. Was on ertapenem at nursing home. Should this be continued" On 08/09, switched erta to merrem while awaiting additional records.    Stable.  Blood cultures finalized negative.  IR now following and is planning on aspiration or drain placement left hip.  Remains on meropenem.  Afebrile.   Repeat " CT L hip shows fluid smaller since 8/5.  Bone scan ordered.  Patient has small amount of VRE in urine but asymptomatic - no treatment rec'd.    Recommendations:  Continue meropenem  Agree with I R consult for eval and for fluid aspiration to further guide antibiotic therapy  Follow up IR cultures  Follow up bone scan  Will need further records in order to give proper recommendation, specifically [facility] records including infectious disease and ortho surgery notes, as well as culture reports with sensitivities.       -- Discussed with ID staff and primary team   -- ID will continue to follow w/ further recs.              Anticipated Disposition: tbd    Thank you for your consult. I will follow-up with patient. Please contact us if you have any additional questions.    OCTAVIA Murphy  Infectious Disease  Yossi Vargas - Neurosurgery (Hospital)    Subjective:     Principal Problem:Seizure-like activity    HPI: Ms. Clemente is a 83 yo female with PMH of left femoral neck fracture, s/p left bipolar hemiarthroplasty on 6/4/24, paroxysmal atrial fibrillation (on Xarelto), hypothyroidism, dementia, and hyperlipidemia admitted to [facility] (from [facility]) on August 4 with low hemoglobin. While at OSH, work up was nonrevealing for source of anemia, pt had reported tarry stools, though stool hemoccult was negative. Pt reported swelling around her left hip, and CT left hip noted soft tissue swelling/fluid in deep left pelvis, presacral region. Pt was also treated empirically for cysitits with cipro. During a CT of her AP, pt had a witnessed seizure that required keppra. Neurology was consulted and MRI brain and EEG without acute findings. While at OSH, ortho surgery and interventional radiology was consulted at [facility] and surgical intervention was not planned, though recommended to continue close monitoring of H/H and if continues to drop recommending CTA of abdomen/pelvis to see if IR intervention is  "indicated. Ultimately, pt was transferred to [facility] for EEG, neurology eval 2/2 new onset seizures, as well as close monitoring in H/H.     Prior to admission to [facility], pt was sent to [facility] as there was concerns for left hip pain, redness, and swelling. Per limited records available, appears pt was bacteremic with proteus vulgaris on 6/24/24, also with Ecoli ESBL uti. It appears Dr. Dave Nascimento was following pt. Per ED notes on 7/25/24 from [facility], pt was noted that "pt underwent left hip debridement for post op left hip infection on July 1st, she had proteus in her tissues, culture later was positive for pseudomonas, staph hemolyticus, and enterococcus. Pt was only on rocephin, though it was planned to continue cefepime,  however it was discontinued and pt was started on rocephin alone."     ID was consulted d/t "Wound infection. Was on ertapenem at nursing home. Should this be continued"        Interval History:   No acute events overnight  IR consulted for left hip drainage/aspiration  Complains of left hip discomfort  Ortho Sx now following  Denies dysuria, fevers chills or sweats    Review of Systems   Constitutional:  Negative for chills, diaphoresis, fatigue and fever.   HENT: Negative.     Respiratory:  Negative for cough and shortness of breath.    Cardiovascular:  Negative for leg swelling.   Gastrointestinal:  Negative for blood in stool, diarrhea, nausea and vomiting.   Genitourinary:  Negative for difficulty urinating, dysuria, flank pain and pelvic pain.   Skin:  Negative for rash and wound.   Psychiatric/Behavioral:  Positive for confusion (though oriented). Negative for agitation.      Objective:     Vital Signs (Most Recent):  Temp: 98 °F (36.7 °C) (08/13/24 1520)  Pulse: 71 (08/13/24 1520)  Resp: 18 (08/13/24 1520)  BP: (!) 192/87 (08/13/24 1520)  SpO2: 98 % (08/13/24 1520) Vital Signs (24h Range):  Temp:  [97.2 °F (36.2 °C)-98 °F (36.7 °C)] 98 °F (36.7 °C)  Pulse:  " [68-75] 71  Resp:  [16-20] 18  SpO2:  [94 %-98 %] 98 %  BP: (161-195)/(55-89) 192/87     Weight: 82.5 kg (181 lb 14.1 oz)  Body mass index is 28.49 kg/m².    Estimated Creatinine Clearance: 36.2 mL/min (based on SCr of 1.3 mg/dL).     Physical Exam  Vitals reviewed.   Constitutional:       General: She is not in acute distress.     Appearance: Normal appearance. She is not ill-appearing.   HENT:      Head: Normocephalic.      Nose: Nose normal.      Mouth/Throat:      Mouth: Mucous membranes are moist.      Pharynx: Oropharynx is clear.   Eyes:      General:         Right eye: No discharge.         Left eye: No discharge.      Conjunctiva/sclera: Conjunctivae normal.   Cardiovascular:      Rate and Rhythm: Normal rate and regular rhythm.      Pulses: Normal pulses.      Heart sounds: Normal heart sounds. No murmur heard.  Pulmonary:      Effort: Pulmonary effort is normal. No respiratory distress.      Breath sounds: Normal breath sounds. No stridor.   Abdominal:      General: Abdomen is flat. There is no distension.      Palpations: Abdomen is soft.      Tenderness: There is no abdominal tenderness. There is no right CVA tenderness or left CVA tenderness.   Musculoskeletal:         General: Normal range of motion.      Cervical back: Normal range of motion.      Right lower leg: No edema.      Left lower leg: No edema.   Skin:     Findings: No lesion or rash.      Comments: With healed left hip incision, without surrounding swelling, redness. Without tenderness to palpation. Without drainage or underlying fluctuance.    Neurological:      Mental Status: She is alert and oriented to person, place, and time.      Comments: Mumbling speech, with intermittent confusion, but overall, alert and oriented x4    Psychiatric:         Mood and Affect: Mood normal.          Significant Labs: Blood Culture:   Recent Labs   Lab 08/04/24  1135 08/04/24  1202 08/07/24  1007   LABBLOO No growth after 5 days. No growth after 5 days.  "No growth after 5 days.     CBC:   No results for input(s): "WBC", "HGB", "HCT", "PLT" in the last 48 hours.    CMP:   No results for input(s): "NA", "K", "CL", "CO2", "GLU", "BUN", "CREATININE", "CALCIUM", "PROT", "ALBUMIN", "BILITOT", "ALKPHOS", "AST", "ALT", "ANIONGAP", "EGFRNONAA" in the last 48 hours.    Invalid input(s): "ESTGFAFRICA"    Microbiology Results (last 7 days)       Procedure Component Value Units Date/Time    Culture, Anaerobic [[id number]]     Order Status: No result Specimen: Joint Fluid from Hip, Left     AFB Culture & Smear [[id number]]     Order Status: No result Specimen: Joint Fluid from Hip, Left     Fungus culture [[id number]]     Order Status: No result Specimen: Joint Fluid from Hip, Left     Gram stain [[id number]]     Order Status: No result Specimen: Joint Fluid from Hip, Left     Aerobic culture [[id number]]     Order Status: No result Specimen: Hip     Blood culture [[id number]] Collected: 08/07/24 1007    Order Status: Completed Specimen: Blood Updated: 08/12/24 1212     Blood Culture, Routine No growth after 5 days.    Narrative:      Picc line          Urine Culture:   Recent Labs   Lab 08/04/24  1452   LABURIN ENTEROCOCCUS FAECIUM VRE  50,000 - 99,999 cfu/ml  *     Urine Studies:   Recent Labs   Lab 08/04/24  1452 08/09/24  0919   COLORU Yellow Yellow   APPEARANCEUA Clear Clear   PHUR 7.0 7.0   SPECGRAV 1.015 1.015   PROTEINUA 2+* 2+*   GLUCUA Negative Negative   KETONESU Negative Negative   BILIRUBINUA Negative Negative   OCCULTUA Trace* Trace*   NITRITE Negative Negative   UROBILINOGEN Negative  --    LEUKOCYTESUR Trace* Negative   RBCUA 4 3   WBCUA 8* 2   BACTERIA Many* Rare   SQUAMEPITHEL 4  --    HYALINECASTS 0 3*     All pertinent labs within the past 24 hours have been reviewed.    Significant Imaging: I have reviewed all pertinent imaging results/findings within the past 24 hours.  CT Pelvis With IV Contrast [[id number]] Resulted: 08/13/24 1415   Order Status: Completed " Updated: 08/13/24 1418   Narrative:     EXAMINATION:  CT PELVIS WITH IV CONTRAST    CLINICAL HISTORY:  Evaluate pelvic fluid collection;    TECHNIQUE:  Low dose axial images, sagittal and coronal reformations were obtained from the iliac crests to the pubic symphysis after the administration of 75 cc Omnipaque 350 intravenous contrast.  Oral contrast was not administered.    COMPARISON:  CT abdomen pelvis 08/05/2024.  CT hip 08/04/2024. Hip radiograph 08/12/2024.    FINDINGS:  BOWEL/MESENTERY: No evidence of bowel obstruction or inflammatory process.    REPRODUCTIVE: Hysterectomy.    URINARY BLADDER: Mildly distended.  Small foci of intraluminal air, likely related to recent instrumentation.    VASCULATURE: No abdominal aortic aneurysm. No significant atherosclerotic calcification.    BONES/EXTRAPERITONEAL SOFT TISSUES: Recent postoperative change of left hip arthroplasty.  Hardware appears intact without perihardware lucency.  Overlying irregular fluid collection measuring approximately 7.0 x 5.1 cm maximal axial dimension (axial image 54).  Surrounding stranding, tracking towards the cutaneous surface and inferiorly along the left quadriceps musculature.  Overall appearance is decreased in size compared to prior 08/05/2024.    Additional heterogeneous fluid collection tracking superiorly within the substance of the left iliopsoas, measuring 4.0 x 2.9 cm maximal axial dimension (axial image 28).  Hyperdense components suggestive of more recent blood products. Overall appearance is slightly increased in size compared to prior 08/05/2024, noting greater hypodense component compatible with aging blood products.    OTHER: Layering hyperattenuation in the partially visualized gallbladder, similar to prior.    Trace pelvic free fluid.   Impression:       Recent postoperative change of left hip arthroplasty.  Overlying irregular fluid collection appears decreased in size compared to prior CT 08/05/2024.    Presumed  intramuscular hematoma within the left iliopsoas, appears slightly increased in size.  Hyperdense components suggestive of more recent blood products.  Hemorrhagic iliopsoas bursitis less likely.    Electronically signed by resident: Hansel Saucedo  Date: 08/13/2024  Time: 13:34    Electronically signed by: Lalo Orozco MD  Date: 08/13/2024  Time: 14:15     X-Ray Hip 2 or 3 views Left with Pelvis when performed [[id number]] Resulted: 08/12/24 1453   Order Status: Completed Updated: 08/12/24 1455   Narrative:     EXAMINATION:  XR HIP WITH PELVIS WHEN PERFORMED 2 OR 3 VIEWS LEFT    CLINICAL HISTORY:  left hip fluid collection per CT read;    TECHNIQUE:  AP view of the pelvis and frog leg lateral view of the left hip were performed.    COMPARISON:  CT hip August 4, 2024    FINDINGS:  There are surgical changes of total right hip arthroplasty.  The surrounding osseous structures are intact without fracture or osseous destructive process.   Impression:       As above      Electronically signed by: Joceline Freire MD  Date: 08/12/2024  Time: 14:53   X-Ray Chest 1 View [[id number]] Resulted: 08/08/24 2126   Order Status: Completed Updated: 08/08/24 2128   Narrative:     EXAMINATION:  XR CHEST 1 VIEW    CLINICAL HISTORY:  concern for aspiration;    TECHNIQUE:  Single frontal view of the chest was performed.    COMPARISON:  08/04/2024.    FINDINGS:  There are continued coarse chronic interstitial opacities, similar prior.  There is no new focal consolidation or significant detrimental change from prior.  There are multiple surgical clips.  The pleural spaces are clear.  The cardiac silhouette is enlarged.  There are calcifications of the aortic arch.  Osseous structures demonstrate degenerative changes.   Impression:       No significant detrimental change from prior study.      Electronically signed by: Ernst Whipple  Date: 08/08/2024  Time: 21:26      Imaging History     2024    Date Procedure Name Study Review Link PACS  Link Status Accession Number Location   08/12/24 02:09 PM X-Ray Hip 2 or 3 views Left with Pelvis when performed Study Review  Images Final [id number] [facility]   08/11/24 04:04 PM Cardiac monitoring strips Study Review  Final     08/09/24 01:21 AM Cardiac monitoring strips Study Review  Final     08/08/24 08:32 PM X-Ray Chest 1 View Study Review  Images Final [id number] [facility]   08/05/24 02:04 PM Echo Saline Bubble? No; Ultrasound enhancing contrast? No Study Review  Images Final [id number] [facility]   08/05/24 12:02 PM MRI Brain Without Contrast Study Review  Images Final [id number] [facility]   08/05/24 12:00 PM CT Abdomen Pelvis  Without Contrast Study Review  Images Final [id number] [facility]   08/04/24 06:55 PM CT Hip Without Contrast Left Study Review  Images Final [id number] [facility]   08/04/24 12:36 PM X-Ray Chest AP Portable Study Review  Images Final [id number] [facility]   08/04/24 12:30 PM CT Head Without Contrast Study Review  Images Final [id number] [facility]

## 2024-08-13 NOTE — SUBJECTIVE & OBJECTIVE
Interval History:   No acute events overnight  IR consulted for left hip drainage/aspiration  Complains of left hip discomfort  Denies fevers chills or sweats    Review of Systems   Constitutional:  Negative for chills, diaphoresis, fatigue and fever.   HENT: Negative.     Respiratory:  Negative for cough and shortness of breath.    Cardiovascular:  Negative for leg swelling.   Gastrointestinal:  Negative for blood in stool, diarrhea, nausea and vomiting.   Genitourinary:  Negative for difficulty urinating, dysuria, flank pain and pelvic pain.   Skin:  Negative for rash and wound.   Psychiatric/Behavioral:  Positive for confusion (though oriented). Negative for agitation.      Objective:     Vital Signs (Most Recent):  Temp: 97.4 °F (36.3 °C) (08/12/24 1958)  Pulse: 69 (08/12/24 1958)  Resp: 18 (08/12/24 1958)  BP: (!) 170/55 (08/12/24 1958)  SpO2: 98 % (08/12/24 1958) Vital Signs (24h Range):  Temp:  [97.4 °F (36.3 °C)-98.7 °F (37.1 °C)] 97.4 °F (36.3 °C)  Pulse:  [69-83] 69  Resp:  [16-18] 18  SpO2:  [93 %-98 %] 98 %  BP: (142-182)/(55-90) 170/55     Weight: 82.5 kg (181 lb 14.1 oz)  Body mass index is 28.49 kg/m².    Estimated Creatinine Clearance: 36.2 mL/min (based on SCr of 1.3 mg/dL).     Physical Exam  Vitals reviewed.   Constitutional:       General: She is not in acute distress.     Appearance: Normal appearance. She is not ill-appearing.   HENT:      Head: Normocephalic.      Nose: Nose normal.      Mouth/Throat:      Mouth: Mucous membranes are moist.      Pharynx: Oropharynx is clear.   Eyes:      General:         Right eye: No discharge.         Left eye: No discharge.      Conjunctiva/sclera: Conjunctivae normal.   Cardiovascular:      Rate and Rhythm: Normal rate and regular rhythm.      Pulses: Normal pulses.      Heart sounds: Normal heart sounds. No murmur heard.  Pulmonary:      Effort: Pulmonary effort is normal. No respiratory distress.      Breath sounds: Normal breath sounds. No stridor.  "  Abdominal:      General: Abdomen is flat. There is no distension.      Palpations: Abdomen is soft.      Tenderness: There is no abdominal tenderness. There is no right CVA tenderness or left CVA tenderness.   Musculoskeletal:         General: Normal range of motion.      Cervical back: Normal range of motion.      Right lower leg: No edema.      Left lower leg: No edema.   Skin:     Findings: No lesion or rash.      Comments: With healed left hip incision, without surrounding swelling, redness. Without tenderness to palpation. Without drainage or underlying fluctuance.    Neurological:      Mental Status: She is alert and oriented to person, place, and time.      Comments: Mumbling speech, with intermittent confusion, but overall, alert and oriented x4    Psychiatric:         Mood and Affect: Mood normal.          Significant Labs: Blood Culture:   Recent Labs   Lab 08/04/24  1135 08/04/24  1202 08/07/24  1007   LABBLOO No growth after 5 days. No growth after 5 days. No growth after 5 days.     CBC:   No results for input(s): "WBC", "HGB", "HCT", "PLT" in the last 48 hours.    CMP:   No results for input(s): "NA", "K", "CL", "CO2", "GLU", "BUN", "CREATININE", "CALCIUM", "PROT", "ALBUMIN", "BILITOT", "ALKPHOS", "AST", "ALT", "ANIONGAP", "EGFRNONAA" in the last 48 hours.    Invalid input(s): "ESTGFAFRICA"    Microbiology Results (last 7 days)       Procedure Component Value Units Date/Time    Culture, Anaerobic [[id number]]     Order Status: No result Specimen: Joint Fluid from Hip, Left     AFB Culture & Smear [[id number]]     Order Status: No result Specimen: Joint Fluid from Hip, Left     Fungus culture [[id number]]     Order Status: No result Specimen: Joint Fluid from Hip, Left     Gram stain [[id number]]     Order Status: No result Specimen: Joint Fluid from Hip, Left     Aerobic culture [[id number]]     Order Status: No result Specimen: Hip     Blood culture [[id number]] Collected: 08/07/24 1007    Order " Status: Completed Specimen: Blood Updated: 08/12/24 1212     Blood Culture, Routine No growth after 5 days.    Narrative:      Picc line          Urine Culture:   Recent Labs   Lab 08/04/24  1452   LABURIN ENTEROCOCCUS FAECIUM VRE  50,000 - 99,999 cfu/ml  *     Urine Studies:   Recent Labs   Lab 08/04/24  1452 08/09/24  0919   COLORU Yellow Yellow   APPEARANCEUA Clear Clear   PHUR 7.0 7.0   SPECGRAV 1.015 1.015   PROTEINUA 2+* 2+*   GLUCUA Negative Negative   KETONESU Negative Negative   BILIRUBINUA Negative Negative   OCCULTUA Trace* Trace*   NITRITE Negative Negative   UROBILINOGEN Negative  --    LEUKOCYTESUR Trace* Negative   RBCUA 4 3   WBCUA 8* 2   BACTERIA Many* Rare   SQUAMEPITHEL 4  --    HYALINECASTS 0 3*     All pertinent labs within the past 24 hours have been reviewed.    Significant Imaging: I have reviewed all pertinent imaging results/findings within the past 24 hours.  X-Ray Hip 2 or 3 views Left with Pelvis when performed [[id number]] Resulted: 08/12/24 1453   Order Status: Completed Updated: 08/12/24 1455   Narrative:     EXAMINATION:  XR HIP WITH PELVIS WHEN PERFORMED 2 OR 3 VIEWS LEFT    CLINICAL HISTORY:  left hip fluid collection per CT read;    TECHNIQUE:  AP view of the pelvis and frog leg lateral view of the left hip were performed.    COMPARISON:  CT hip August 4, 2024    FINDINGS:  There are surgical changes of total right hip arthroplasty.  The surrounding osseous structures are intact without fracture or osseous destructive process.   Impression:       As above      Electronically signed by: Joceline Freire MD  Date: 08/12/2024  Time: 14:53   X-Ray Chest 1 View [[id number]] Resulted: 08/08/24 2126   Order Status: Completed Updated: 08/08/24 2128   Narrative:     EXAMINATION:  XR CHEST 1 VIEW    CLINICAL HISTORY:  concern for aspiration;    TECHNIQUE:  Single frontal view of the chest was performed.    COMPARISON:  08/04/2024.    FINDINGS:  There are continued coarse chronic interstitial  opacities, similar prior.  There is no new focal consolidation or significant detrimental change from prior.  There are multiple surgical clips.  The pleural spaces are clear.  The cardiac silhouette is enlarged.  There are calcifications of the aortic arch.  Osseous structures demonstrate degenerative changes.   Impression:       No significant detrimental change from prior study.      Electronically signed by: Ernst Whipple  Date: 08/08/2024  Time: 21:26      Imaging History     2024    Date Procedure Name Study Review Link PACS Link Status Accession Number Location   08/12/24 02:09 PM X-Ray Hip 2 or 3 views Left with Pelvis when performed Study Review  Images Final [id number] [facility]   08/11/24 04:04 PM Cardiac monitoring strips Study Review  Final     08/09/24 01:21 AM Cardiac monitoring strips Study Review  Final     08/08/24 08:32 PM X-Ray Chest 1 View Study Review  Images Final [id number] [facility]   08/05/24 02:04 PM Echo Saline Bubble? No; Ultrasound enhancing contrast? No Study Review  Images Final [id number] [facility]   08/05/24 12:02 PM MRI Brain Without Contrast Study Review  Images Final [id number] [facility]   08/05/24 12:00 PM CT Abdomen Pelvis  Without Contrast Study Review  Images Final [id number] [facility]   08/04/24 06:55 PM CT Hip Without Contrast Left Study Review  Images Final [id number] [facility]   08/04/24 12:36 PM X-Ray Chest AP Portable Study Review  Images Final [id number] [facility]   08/04/24 12:30 PM CT Head Without Contrast Study Review  Images Final [id number] [facility]

## 2024-08-13 NOTE — CONSULTS
Yossi Vargas - Neurosurgery ([facility])  Orthopedics  Consult Note      Attg Note:  I agree with the resident's assessment and plan.  I attempted aspiration but could not draw any fluid from the area leading into leave it was coagulated hematoma.  Normal hemiarthroplasty postop protocol with PT and OT.    Frandy Herrera MD      Patient Name: Shaye Clemente  MRN: [id number]  Admission Date: 8/6/2024  Hospital Length of Stay: 7 days  Attending Provider: Traci Mancini MD  Primary Care Provider: Skip Singh MD    Inpatient consult to Orthopedics  Consult performed by: Nba Contreras MD  Consult ordered by: Tarci Mancini MD        Subjective:     Principal Problem:Seizure-like activity    Chief Complaint: No chief complaint on file.       HPI:   84F with h/o pAFib (on xarelto), dementia, femoral neck fracture, s/p left bipolar hemiarthroplasty with hardware placed on 6/4/24 at [facility]. (Minimal records available at this time). Hosp course c/b bacteremia 06/24 1 of 2 bottles +proteus vulgaris (R-ampicillin, cefuroxime, tetracycline); and surgical wound infection. Left hip wound cx 06/27: +PSA (I-aztreonam), +Staph hemolyticus (R-tetracycline, S-vanc), +E.faecalis (panS), +proteus vulgaris (R-ampicillin, cefuroxime, Tetracycline). Pt underwent surgical debridement (no Op notes available) 07/01, surgical cxs NG.  Wound swab cxs 07/01 No growth. Also, records reveal ESBL-e.coli UTI 06/25. It seems pt has been maintained on Iv-Erta since then, despite not covering all bacteria isolated on cxs from infected surgical wound; and per family, pt was to complete prolonged course Erta ~6wks, with impending BRENDAN. -- Pt transferred to OC from [facility] for further work-up of unwitnessed seizure, and anemia. Unclear what provoked seizure. EEG was negative.  Ct-a/p and Ct-hip reveal stable hardware, but note non-specific soft-tissue fluid collection at lateral hip measuring 9.4 x 8.7 x 4.8cm, as well as  IM hematoma of left iliopsoas, psoas, and iliacus muscle, with potential active hemorrhage    Orthopedic surgery was consulted for evaluation of left hip pain. Pt was seen and evaluated at bedside. She reported that hip pain started after a GLF 2 months ago. Prior to this, she was ambulatory without assistance at baseline. She had a hemiarthroplasty at a hospital in [place] after the fall. Chart review revealed that she became bacteremic and had positive surgical site infection after wards which was treated with antibiotics and one trip to the OR for I&D. Unable to determine if there was hardware removal vs exchange during this I&D due to unavailability of medical record from previous hospital.  She has been nonweightbearing since the index surgery 2 months ago. She reports left hip pain worse when she rolls in bed. She denied numbness or tingling traveling to her foot. She has no other msk symptoms. CT imaging revealed subcutaneous fluid present adjacent to the greater troch of left hip and possible fluid collection along the iliopsoas that is likely from a hematoma. She was transferred to [facility] for evaluation of seizure by neurology.       Past Medical History:   Diagnosis Date    Mumps without complication     Paroxysmal atrial fibrillation        Past Surgical History:   Procedure Laterality Date    BREAST SURGERY Right     partial    HEMIARTHROPLASTY, HIP, POSTERIOR APPROACH Left 06/04/2024    HYSTERECTOMY      TONSILLECTOMY      VAGINECTOMY         Review of patient's allergies indicates:   Allergen Reactions    Prilosec [omeprazole]     Augmentin [amoxicillin-pot clavulanate] Nausea And Vomiting    Codeine Rash    Sulfa (sulfonamide antibiotics) Rash       Current Facility-Administered Medications   Medication    acetaminophen tablet 650 mg    albuterol-ipratropium 2.5 mg-0.5 mg/3 mL nebulizer solution 3 mL    aluminum-magnesium hydroxide-simethicone 200-200-20 mg/5 mL suspension 30 mL     "atorvastatin tablet 40 mg    calcium carbonate 200 mg calcium (500 mg) chewable tablet 500 mg    carvediloL tablet 12.5 mg    docusate sodium capsule 100 mg    ferrous sulfate tablet 1 each    glucagon (human recombinant) injection 1 mg    hydrALAZINE injection 5 mg    hydrALAZINE tablet 25 mg    HYDROcodone-acetaminophen 5-325 mg per tablet 1 tablet    levETIRAcetam tablet 500 mg    levothyroxine tablet 25 mcg    LORazepam injection 2 mg    melatonin tablet 6 mg    memantine tablet 5 mg    meropenem 2 g in 0.9% NaCl 100 mL IVPB (MB+)    montelukast tablet 10 mg    naloxone 0.4 mg/mL injection 0.02 mg    ondansetron injection 4 mg    polyethylene glycol packet 17 g    prochlorperazine injection Soln 5 mg     Family History    None       Tobacco Use    Smoking status: Never    Smokeless tobacco: Never   Substance and Sexual Activity    Alcohol use: Not on file    Drug use: Not on file    Sexual activity: Not on file     ROS  Constitutional: negative for fevers  Eyes: negative visual changes  ENT: negative for hearing loss  Respiratory: negative for dyspnea  Cardiovascular: negative for chest pain  Gastrointestinal: negative for abdominal pain      Objective:     Vital Signs (Most Recent):  Temp: 97.5 °F (36.4 °C) (08/12/24 2338)  Pulse: 71 (08/12/24 2338)  Resp: 16 (08/12/24 2338)  BP: (!) 180/70 (08/12/24 2338)  SpO2: 95 % (08/12/24 2338) Vital Signs (24h Range):  Temp:  [97.4 °F (36.3 °C)-98.3 °F (36.8 °C)] 97.5 °F (36.4 °C)  Pulse:  [69-83] 71  Resp:  [16-18] 16  SpO2:  [93 %-98 %] 95 %  BP: (142-182)/(55-90) 180/70     Weight: 82.5 kg (181 lb 14.1 oz)  Height: 5' 7" (170.2 cm)  Body mass index is 28.49 kg/m².      Intake/Output Summary (Last 24 hours) at 8/13/2024 0012  Last data filed at 8/12/2024 0800  Gross per 24 hour   Intake --   Output 500 ml   Net -500 ml        Ortho/SPM Exam  Gen:  No acute distress, well-developed, well nourished.  CV:  Peripherally well-perfused.   Respiratory:  Normal respiratory " "effort. No accessory muscle use.   Head/Neck:  Normocephalic.  Atraumatic. Sclera anicteric. TM. Neck supple.  Neuro: No FND. Awake. Alert. Oriented to person, place, time, and situation.  Abdomen: Soft, NTND.      MSK:  Right Upper Extremity  Inspection  - Skin intact throughout, no open wounds  - No swelling  - No ecchymosis, erythema, or signs of cellulitis  Palpation  - NonTTP throughout, no palpable abnormality   Range of motion  - AROM and PROM of the shoulder, elbow, wrist, and hand intact  Stability  - No evidence of joint dislocation or abnormal laxity   Neurovascular  - AIN/PIN/Radial/Median/Ulnar Nerves assessed in isolation without deficit  - Able to give thumbs up, make "OK" sign, cross IF/LF, abduct/adduct fingers, make fist  - SILT throughout  - Compartments soft  - Radial artery 2+  - Muscle tone normal    Left Upper Extremity  Inspection  - Skin intact throughout, no open wounds  - No swelling  - No ecchymosis, erythema, or signs of cellulitis  Palpation  - NonTTP throughout, no palpable abnormality   Range of motion  - AROM and PROM of the shoulder, elbow, wrist, and hand intact  Stability  - No evidence of joint dislocation or abnormal laxity  Neurovascular  - AIN/PIN/Radial/Median/Ulnar Nerves assessed in isolation without deficit  - Able to give thumbs up, make "OK" sign, cross IF/LF, abduct/adduct fingers, make fist  - SILT throughout  - Compartments soft  - Radial artery 2+  - Muscle tone normal      Right Lower Extremity  Inspection  - Skin intact throughout, no open wounds  - No swelling  - No ecchymosis, erythema, or signs of cellulitis  Palpation  - NonTTP throughout, no palpable abnormality   Range of motion  - AROM and PROM of the hip, knee, ankle, and foot intact  Stability  - No evidence of joint dislocation or abnormal laxity,   Neurovascular  - TA/EHL/Gastroc/FHL assessed in isolation without deficit  - SILT throughout  - Compartments soft  - DP 2+   - Negative Log roll  - Negative " Stinchfield  - Muscle tone normal    Left Lower Extremity  Inspection  - Skin intact throughout, no open wounds  - No swelling  - No ecchymosis, erythema, or signs of cellulitis  Palpation  - mild TTP at the hip  Range of motion  - AROM and PROM of the ankle, and foot intact  - ROM of the hip and knee limited due to pain  Neurovascular  - TA/EHL/Gastroc/FHL assessed in isolation without deficit  - SILT throughout  - Compartments soft  - DP 2+   - Positive Log roll  - Positive Stinchfield  - Muscle tone normal    Significant Imaging: I have reviewed and interpreted all pertinent imaging results/findings.  Xr pelvis revealed L hemiarthroplasty  Assessment/Plan:     Left hip pain  Shaye Clemente is a 84 y.o. female presenting with left hip pain 2 months status post left hip maite arthroplasty at [facility].  She developed bacteremia and surgical site infection and had a and I and D of left hip 6 weeks ago. Records of surgery and treatment from previous hospital was not available for review. She continues to say endorsed left hip pain with rolling in bed.  She has not been able to put weight on the hip.  She is currently admitted for workup of seizures.  Imaging revealed presence of subcutaneous fluid over the lateral proximal thigh and possible hematoma collection around the iliopsoas tendon.  Inflammatory markers are elevated.  We will repeat CT pelvis to re-evaluate size of  fluid collection.  Consult IR aspiration of the fluid for analysis and culture.      - Antibiotics:  Per ID  - WBAT LLE  - Pain control: multimodal pain management  - PT/OT:  Recommend weightbear as tolerated  - NPO at midnight          Thank you for your consult.     Nba Contreras MD  Orthopedics  Yossi Vargas - Neurosurgery ([facility])

## 2024-08-13 NOTE — PROGRESS NOTES
Yossi Vargas - Neurosurgery ([facility])  [facility]  Progress Note    Patient Name: Shaye Clemente  MRN: [id number]  Patient Class: IP- Inpatient   Admission Date: 8/6/2024  Length of Stay: 7 days  Attending Physician: Traci Mancini MD  Primary Care Provider: Skip Singh MD        Subjective:     Principal Problem:Seizure-like activity        HPI:  84-year-old female with a left hip hemiarthroplasty in June (by report had hemiarthroplasty of closed comminuted femoral neck hip fracture), paroxysmal atrial fibrillation (on Xarelto), hypothyroidism, dementia, and hyperlipidemia admitted to [facility] on August 4 with low hemoglobin.  Patient reported tarry stools for several days, but Hemoccult was negative in the emergency department.  She also noted discomfort and swelling around her left hip.  Labs in the emergency department were concerning for anemia with hemoglobin 6.5.  There was also concern for possible cystitis, and she was placed on ciprofloxacin.  CT of the head had no acute abnormalities, and CT of the left hip showed soft tissue fullness and fluid in the deep left pelvis and presacral region.  Initial EKG showed sinus rhythm.  She was transfused 2 units packed red blood cells and admitted to [facility].  With transfusion, hemoglobin increased to 9.3.  She was confused during her stay.  She was taken for CT of the abdomen and pelvis on the morning of August 5 when she began to have seizure activity.  She had fluttering of her eyes and movement of her head along with dorsiflexion of both feet.  She was given Keppra and placed on twice daily Keppra.  MRI brain had no acute intracranial abnormality.     She underwent Neurology tele-consultation, and recommendations included additional Keppra along with Q 4 hour neuro checks and routine EEG.  She was lethargic and required repeated stimulation for her to participate during examination.  She was oriented to person and time.  She was able  to follow simple commands.  Mentation appears to be slowly improving.      She underwent noncontrasted CT of the abdomen and pelvis that showed a subcutaneous fluid collection overlying the left hip which may represent resolving hematoma or postsurgical seroma.  There were also findings suspicious for intramuscular hematoma in the left iliopsoas, psoas, and iliacus.  Transfer center spoke with Orthopedic Surgery at [facility].  At present, there does not appear to be surgical intervention indicated in the left hip.  Case also discussed with Interventional Radiology at [facility].  Monitor the trend in hemoglobin and hemodynamic status.  If she remains stable, continue monitoring.  If she has a significant drop in hemoglobin or show signs of hemodynamic instability, she may need CTA of the abdomen and pelvis (bleed protocol) to determine if IR intervention is indicated. Pt. Transferred to[facility]ospital Medicine at [facility] in step-down status for EEG and Neurology evaluation of new onset seizures as well as monitoring the trend in her hemoglobin.     August 5:  Sodium 135, potassium 3.2, chloride 104, CO2 23, BUN 12, creatinine 1.7, glucose 84, calcium 8.4, magnesium 1.4, phosphorus 3.2, AST 20, ALT, white blood cells 7.13, hemoglobin 9.3, hematocrit 27.9, platelets 242  -repeat CBC on the afternoon of August 5 had hemoglobin 10.7 and hematocrit 32.7 (improved from earlier).  -CT abdomen and pelvis without contrast noted small hypoattenuating nodule in the right hepatic lobe may represent a cyst.  Diverticulosis.  Recent right total left hip arthroplasty with poorly defined subcutaneous fluid collection overlying the left hip which may represent resolving hematoma or postsurgical seroma.  Postsurgical abscess not excludable.  Findings suspicious for intramuscular hematoma of the left iliopsoas, psoas, and iliacus muscles with potential active hemorrhage.  -MRI brain was motion limited.  No acute  intracranial abnormality.  Cortical atrophy with periventricular deep white matter change consistent with chronic small-vessel ischemic disease.     August 4: Blood cultures with no growth to date, INR 1.8, iron saturation 19%, stool for occult blood negative, lactic acid 0.7, hemoglobin 6.7, hematocrit 20.5, procalcitonin 0.26  -urinalysis with 2+ protein, trace blood, trace leukocytes, 4 RBC, 8 WBC, many bacteria  -chest x-ray had no acute cardiopulmonary process identified  -CT head had no hemorrhage or other acute intracranial CT findings.  -CT left hip showed nonspecific soft tissue fullness to lateral hip measuring 9.4 x 8.7 x 4.8 cm.  Partially imaged slightly hyperattenuating fluid in the left deep pelvis and presacral region which may represent blood products.  Postsurgical changes of left hip arthroplasty.  -EKG showed normal sinus rhythm and appeared to be fairly normal.    Overview/Hospital Course:  Patient maintained stable hemoglobin since admission to [facility].  Working well with speech therapy, tolerating bite size soft diet.  TSH noted to be markedly elevated up to 19, started on Synthroid    Interval History:   8/13: Working on plan concerning hip issue     Review of Systems   Constitutional:  Negative for activity change, appetite change, chills, fever and unexpected weight change.   HENT:  Negative for congestion and sore throat.    Respiratory:  Negative for cough and shortness of breath.    Cardiovascular:  Negative for chest pain, palpitations and leg swelling.   Gastrointestinal:  Negative for abdominal distention, abdominal pain, blood in stool, constipation, diarrhea, nausea and vomiting.   Genitourinary:  Negative for difficulty urinating, dysuria and hematuria.   Musculoskeletal:  Negative for arthralgias and myalgias.   Skin:  Negative for color change and rash.   Neurological:  Negative for dizziness, tremors and seizures.   Psychiatric/Behavioral:  Positive for confusion.       Objective:     Vital Signs (Most Recent):  Temp: 98 °F (36.7 °C) (08/13/24 1520)  Pulse: 71 (08/13/24 1520)  Resp: 18 (08/13/24 1520)  BP: (!) 192/87 (08/13/24 1520)  SpO2: 98 % (08/13/24 1520) Vital Signs (24h Range):  Temp:  [97.2 °F (36.2 °C)-98 °F (36.7 °C)] 98 °F (36.7 °C)  Pulse:  [68-75] 71  Resp:  [16-20] 18  SpO2:  [94 %-98 %] 98 %  BP: (161-195)/(55-89) 192/87     Weight: 82.5 kg (181 lb 14.1 oz)  Body mass index is 28.49 kg/m².  No intake or output data in the 24 hours ending 08/13/24 1728      Physical Exam  Vitals reviewed.   Constitutional:       General: She is not in acute distress.     Appearance: She is well-developed.   HENT:      Head: Normocephalic and atraumatic.      Comments: EEG in place.   Eyes:      Extraocular Movements: Extraocular movements intact.      Pupils: Pupils are equal, round, and reactive to light.   Neck:      Vascular: No JVD.      Trachea: No tracheal deviation.   Cardiovascular:      Rate and Rhythm: Normal rate and regular rhythm.      Heart sounds: No murmur heard.     No friction rub. No gallop.   Pulmonary:      Effort: No respiratory distress.      Breath sounds: Normal breath sounds. No wheezing or rales.   Abdominal:      General: Bowel sounds are normal. There is no distension.      Palpations: Abdomen is soft. There is no mass.      Tenderness: There is no abdominal tenderness.   Musculoskeletal:         General: No deformity.      Cervical back: Neck supple.   Lymphadenopathy:      Cervical: No cervical adenopathy.   Skin:     General: Skin is warm and dry.      Findings: No rash.   Neurological:      Mental Status: She is alert. She is disoriented.      Cranial Nerves: Cranial nerves 2-12 are intact.      Deep Tendon Reflexes:      Reflex Scores:       Tricep reflexes are 2+ on the right side and 2+ on the left side.       Bicep reflexes are 2+ on the right side and 2+ on the left side.       Patellar reflexes are 2+ on the right side and 2+ on the left  side.     Comments: Alert to person, place in hospital and year but not date/month/circumstances behind hospitalization, mildly confused   Psychiatric:         Speech: Speech is slurred.             Significant Labs: All pertinent labs within the past 24 hours have been reviewed.    Significant Imaging: I have reviewed all pertinent imaging results/findings within the past 24 hours.    Assessment/Plan:      * Seizure-like activity  -Reported seizure at [facility] prior to transfer pt. Was post-ictal and remains confused  -MRI brain uremarkable  -EEG so far unremarkable for any seizure activity   -currently on Keppra 500, follow up Neurology recommendations   -seizure precautions, fall precautions   -neuro checks      Hypertension  Pressures noted to be in the 180s systolic   Continue home Toprol   Follow up with pharmacy and family regarding home medication list    Dementia  Neuro checks   Fall precautions   Seizure precautions  Memantine   Follow up with pharmacy consultation and family input for home medication list    Hypothyroidism  Starting Synthroid 88 mcg   Following up with a pharmacy consult for home medication reconciliation      LUIS CARLOS (acute kidney injury)  -unclear if baseline, creatinine initially elevated at 2.0 now improving down to 1.4,   -stopping IV fluids    Encephalopathy, metabolic  -Pt. With intermittent confusion, seizure activity. Mentation seems to be improving with time after seizure   -do not suspect true UTI as cause given very low colony count  -MRI brain negative for acute findings.   -TSH markedly elevated, see below for further treatment, could have been easily contributing factor  -Delirium precautions ordered    Hip hematoma, left  -Pt. Presented anemia, required 1 unit pRBC, Hgb now stable ~10. CT abdomen conerning for subcutaneous fluid collection overlying the left hip may represent resolving hematoma or postsurgical seroma andFindings suspicious for an intramuscular hematoma  "of the left iliopsoas, psoas and iliacus muscles with potential active hemorrhage.  -Case discussed with IR and ortho prior to transfer, plan "Monitor the trend in hemoglobin and hemodynamic status. If she remains stable, continue monitoring. If she has a significant drop in hemoglobin or show signs of hemodynamic instability, she may need CTA of the abdomen and pelvis (bleed protocol) to determine if IR intervention is indicated"          Paroxysmal atrial fibrillation  -Hx of pAfib. EKG on admit showed NSR, rhythm currently regular  -Continue home metoprolol and monitor on telemetry.  Holding Xarelto due to bleeding concerns-this was affirmed by the daughter via phone    Acute cystitis with hematuria  -appears to be Enterococcus, unclear if truly infectious given very low colony count  -switching from Levaquin over to Macrobid        VTE Risk Mitigation (From admission, onward)           Ordered     Place sequential compression device  Until discontinued         08/06/24 1811                    Discharge Planning   KATI: 8/14/2024     Code Status: DNR   Is the patient medically ready for discharge?:     Reason for patient still in hospital (select all that apply): Patient trending condition  Discharge Plan A: Skilled Nursing Facility                  Traci Mancini MD  Department of Hospital Medicine   [facility] - Neurosurgery ([facility])    "

## 2024-08-13 NOTE — PT/OT/SLP PROGRESS
Physical Therapy Treatment    Patient Name: Shaye Clemente   MRN: [id number]    Therapy tech utilized for session to maximize physical performance and safety  Recommendations:     Discharge Recommendations: Moderate Intensity Therapy  Discharge Equipment Recommendations: hospital bed, lift device, wheelchair  Barriers to Discharge: Increased level of assist  Safest Mobility Level with Nursing: Stand pivot with maximal assistance    Assessment:     Shaye Clemente is a 84 y.o. female admitted with a medical diagnosis of Seizure-like activity. She presents with the following impairments/functional limitations: weakness, impaired endurance, impaired self care skills, impaired functional mobility, gait instability, impaired balance, pain, decreased safety awareness, decreased lower extremity function, impaired fine motor, impaired cognition, orthopedic precautions, decreased coordination. Pt presenting supine and agreeable to completing therapy session. Pt's session notably limited by increased L hip pain once seated at EOB with increased time spent cueing for deep breathing. Pt continues to require significant assistance to complete all visualized functional mobility 2/2 decreased motor initiation, generalized weakness, pain, decreased activity tolerance, and perceived fear of mobility. Pt verbalizing therapy goals of hoping to ambulate again. Pt remains appropriate for moderate intensity therapy following discharge once medically stable. Pt would continue to benefit from skilled acute PT in order to address current deficits and progress functional mobility.     Rehab Prognosis: Good; patient continues to benefit from acute skilled PT services to address these deficits and reach maximum level of function.  Recent Surgery: * No surgery found *      Plan:     During this hospitalization, patient to be seen 4 x/week to address the identified rehab impairments via gait training, therapeutic activities, therapeutic exercises,  "neuromuscular re-education and progress toward the following goals:    Plan of Care Expires:  09/07/24    Subjective     Chief Complaint: L hip pain  Patient/Family Comments/Goals: "I just want to be able to walk again."  Pain/Comfort:  Pain Rating 1:  (unrated, but significant)  Location - Side 1: Left  Location - Orientation 1: generalized  Location 1: hip  Pain Addressed 1: Reposition, Distraction  Pain Rating Post-Intervention 1:  (unrated)    Objective:     Communicated with RN prior to session. Patient found supine with PureWick, oxygen upon PT entry to room.     General Precautions: Standard, fall, contact  Orthopedic Precautions: LLE weight bearing as tolerated  Braces: N/A    Functional Mobility:  Bed Mobility:  Verbal cues for sequencing and technique  Supine to Sit: maximal assistance for LE management and trunk management with HOB elevated and using bed rail  Sit to Supine: moderate assistance of 2 persons for LE management and trunk management with HOB flat  Transfers:    Sit to Stand: x2 reps from EOB; moderate assistance of 2 persons with rolling walker with cues for hand placement and foot placement  Verbal cues for increased use of momentum, improved anterior weight shift, and increased BLE motor activation  Gait: Patient ambulated 2 steps to L along EOB with rolling walker and moderate assistance of 2 persons.   Patient demonstrates unsteady gait, decreased step length, wide base of support, decreased weight shift, decreased foot clearance, flexed posture, decreased jus, and inconsistent bilateral foot placement.  Patient required cues for upright posture, gluteal activation, sequencing, increased step size, foot placement, and increased foot clearance.  All lines remained intact throughout ambulation trial, gait belt utilized.  Balance:   Static Sitting: Poor, able to maintain for 10 minute(s) with CGA-mod 2/2 posterior lean  Verbal and tactile cues provided for upright posture, increased " cervical extension, maintenance of midline orientation, anterior trunk lean, core activation, command following, and improved used of BUE for support  Dynamic Sitting: Poor: Patient unable to accept challenge or move without loss of balance, moderate assistance  Patient performed 2 set(s) of 5 repetitions of  the following seated exercises: long arc quads and marches for left LE. Patient required skilled PT for instruction of exercises and appropriate cues to perform exercises safely and appropriately.    Pt completed 2x5 modified sit ups while seated at EOB with emphasis on core activation and eccentric control  Static Standing: Poor, able to maintain for 2 minute(s) with moderate assistance  Verbal cues for upright posture, increased hip extension, increased knee extension, and maintenance of midline orientation  Dynamic Standing: Poor: Patient unable to accept challenge or move without loss of balance, moderate assistance of 2 persons    AM-PAC 6 CLICK MOBILITY  Turning over in bed (including adjusting bedclothes, sheets and blankets)?: 2  Sitting down on and standing up from a chair with arms (e.g., wheelchair, bedside commode, etc.): 2  Moving from lying on back to sitting on the side of the bed?: 2  Moving to and from a bed to a chair (including a wheelchair)?: 2  Need to walk in hospital room?: 1  Climbing 3-5 steps with a railing?: 1  Basic Mobility Total Score: 10     Therapeutic Activities and Exercises:  Patient educated on role of acute care PT and PT POC, safety while in hospital including calling nurse for mobility, and call light usage  Pt educated on the effects of bed rest and the importance of OOB activity. Pt encouraged to sit UIC majority of day as tolerated and continue daily transfers with nursing assist. Pt verbalized understanding.  Pt educated on importance of maximal participation in therapy session in order to reduce negative effects of prolonged sedentary positioning.   Answered all  questions within PT scope of practice and addressed functional mobility concerns.    Patient left HOB elevated with all lines intact, call button in reach, and RN notified.    GOALS:   Multidisciplinary Problems       Physical Therapy Goals          Problem: Physical Therapy    Goal Priority Disciplines Outcome Goal Variances Interventions   Physical Therapy Goal     PT, PT/OT Progressing     Description: Goals to be met by: 24     Patient will increase functional independence with mobility by performin. Supine to sit with MInimal Assistance  2. Sit to supine with MInimal Assistance  3. Sit to stand transfer with Minimal Assistance  4. Bed to chair transfer with Minimal Assistance using LRAD as needed  5. Gait  x 150 feet with Minimal Assistance using LRAD as needed.   6. Ascend/descend 3 stair with no Handrails and Minimal Assistance  7. Lower extremity exercise program x15 reps per handout, with assistance as needed    Hospital Bed - Patient requires a hospital bed for positioning of the body in ways that are not feasible with an ordinary bed. The patient requires special positioning for pain relief, limited mobility, and/or being unable to independently make changes in body position without the use of a hospital bed. Pillows and wedges will not be adequate for resolving these positional issues.     Wheelchair - Patient has a mobility limitation that significantly impairs their ability to participate in one or more mobility related activities of daily living in customary locations in the home. The mobility limitation cannot be sufficiently resolved by the use of a cane or walker. The use of a manual wheelchair will greatly improve the patient's ability to participate in MRADLs. The patient will use the wheelchair on a regular basis at home. They have expressed their willingness to use a manual wheelchair in the home, and have a caregiver who is available and willing to assist with the wheelchair if  needed.                         Time Tracking:     PT Received On: 08/13/24  PT Start Time: 1425     PT Stop Time: 1440  PT Total Time (min): 15 min     Billable Minutes: Therapeutic Activity 15      Treatment Type: Treatment  PT/PTA: PT     Number of PTA visits since last PT visit: 0     08/13/2024

## 2024-08-13 NOTE — PT/OT/SLP PROGRESS
"Speech Language Pathology Treatment    Patient Name:  Shaye Clemente   MRN:  [id number]  Admitting Diagnosis: Seizure-like activity    Recommendations:                 General Recommendations:  Dysphagia therapy, Speech/language therapy, and Cognitive-linguistic therapy  Diet recommendations:  Soft & Bite Sized Diet - IDDSI Level 6, Liquid Diet Level: Thin liquids - IDDSI Level 0   Aspiration Precautions: Feed only when awake/alert, Meds whole buried in puree, No straws, Small bites/sips, and Standard aspiration precautions   General Precautions: Standard, aspiration  Communication strategies:  none    Assessment:     Shaye Clemente is a 84 y.o. female with an SLP diagnosis of Dysphagia and Dysarthria.      Subjective     " I am ready to go home"  Patient goals: go home     Pain/Comfort:  Pain Rating 1: 7/10  Location 1: hip  Pain Addressed 1: Reposition, Pre-medicate for activity, Nurse notified, Distraction  Pain Rating Post-Intervention 1: 7/10    Respiratory Status: Room air    Objective:     Has the patient been evaluated by SLP for swallowing?   Yes  Keep patient NPO? No   Current Respiratory Status:        Nursing reported pt is npo for IR procedure today. Pt awake/alert. She reported hip pain but stated it had improved. Nursing reported pt was premedicated. Nursing also stated pt took pills whole with water without difficulty this am. Pt was oriented with good recall of recent information and plan of care. Pt ox3 but asking about when she was admitted. Pt with good topic maintenance during session. Reviewed speech strategies and swallowing precautions. Pt expressed good understanding of information. No family present.     Goals:   Multidisciplinary Problems       SLP Goals          Problem: SLP    Goal Priority Disciplines Outcome   SLP Goal     SLP Progressing   Description: Speech Language Pathology Goals  Goals expected to be met by 8/15/24  1. Pt will tolerate least restrictive diet without overt S/S aspiration, " Supervision  2. Pt will participate in full assessment of speech, language and cognition to determine ongoing POC needs -met  3. Educate Pt and family on aspiration precautions and SLP POC  4. Pt will independently verbalize/demonstrate 3 speech strategies to increase intelligibility to 75% or greater in known and unknown contexts.   5. Pt will demonstrate adequate topic maintenance and/or attend to therapy tasks for 1+ minutes given no cues to redirect.   6. Pt will answer safety awareness/problem solving questions with 75% acc min cues.   7. Pt will follow 2+ step directions with 75% acc given 1 repetition.   8. Pt will complete functional sequencing tasks with 75% acc min cues.                        Plan:     Patient to be seen:  3 x/week   Plan of Care expires:  09/06/24  Plan of Care reviewed with:  patient   SLP Follow-Up:  Yes       Discharge recommendations:  Low Intensity Therapy       Time Tracking:     SLP Treatment Date:   08/13/24  Speech Start Time:  0926  Speech Stop Time:  0943     Speech Total Time (min):  17 min    Billable Minutes: Speech Therapy Individual 9 and Self Care/Home Management Training 8    08/13/2024

## 2024-08-13 NOTE — PLAN OF CARE
Problem: Adult Inpatient Plan of Care  Goal: Plan of Care Review  Outcome: Progressing     Problem: Adult Inpatient Plan of Care  Goal: Absence of Hospital-Acquired Illness or Injury  Outcome: Progressing     Problem: Adult Inpatient Plan of Care  Goal: Optimal Comfort and Wellbeing  Outcome: Progressing     Problem: Adult Inpatient Plan of Care  Goal: Readiness for Transition of Care  Outcome: Progressing   Review POC; agrees with plan.  Had CT this shift and showed decreased fluid amount in left hip; to have Bone Scan at some point and possibly IR to drain fluid in left hip.  Doctors are discussing.  Patient had IV placed per IV Midline nurse this shift.  Merrem schedule adjusted per pharmacy once line obtained.  Pain controlled with PRN PO Hydrocodone.  Blood sugars WDL.

## 2024-08-13 NOTE — ASSESSMENT & PLAN NOTE
"84F with h/o pAFib (on xarelto), dementia, femoral neck fracture, s/p left bipolar hemiarthroplasty with hardware placed on 6/4/24 at [facility] [place]. (Minimal records available at this time). Hosp course c/b bacteremia 06/24 1 of 2 bottles +proteus vulgaris (R-ampicillin, cefuroxime, tetracycline); and surgical wound infection. Left hip wound cx 06/27: +PSA (I-aztreonam), +Staph hemolyticus (R-tetracycline, S-vanc), +E.faecalis (panS), +proteus vulgaris (R-ampicillin, cefuroxime, Tetracycline). Pt underwent surgical debridement (no Op notes available) 07/01, surgical cxs NG.  Wound swab cxs 07/01 No growth. Also, records reveal ESBL-e.coli UTI 06/25. It seems pt has been maintained on Iv-Erta since then, despite not covering all bacteria isolated on cxs from infected surgical wound; and per family, pt was to complete prolonged course Erta ~6wks, with impending BRENDAN. -- Pt transferred to [facility] from [facility] for further work-up of unwitnessed seizure, and anemia. Unclear what provoked seizure. EEG was negative.    Ct-a/p and Ct-hip reveal stable hardware, but note non-specific soft-tissue fluid collection at lateral hip measuring 9.4 x 8.7 x 4.8cm, as well as IM hematoma of left iliopsoas, psoas, and iliacus muscle, with potential active hemorrhage.    ID was consulted as "Wound infection. Was on ertapenem at nursing home. Should this be continued" On 08/09, switched erta to merrem while awaiting additional records.    Stable.  Blood cultures finalized negative.  IR now following and is planning on aspiration or drain placement left hip.  Remains on meropenem.  Afebrile.   Repeat CT L hip shows fluid smaller since 8/5.  Bone scan ordered.    Recommendations:  Continue meropenem  Agree with I R consult for eval and for fluid aspiration to further guide antibiotic therapy  Follow up IR cultures  Follow up bone scan  Will need further records in order to give proper recommendation, specifically [facility] including infectious disease and ortho surgery notes, as well as culture reports with sensitivities.       -- Discussed with ID staff and primary team   -- ID will continue to follow w/ further recs.

## 2024-08-13 NOTE — SUBJECTIVE & OBJECTIVE
Interval History:   No acute events overnight  IR consulted for left hip drainage/aspiration  Complains of left hip discomfort  Ortho Sx now following  Denies dysuria, fevers chills or sweats    Review of Systems   Constitutional:  Negative for chills, diaphoresis, fatigue and fever.   HENT: Negative.     Respiratory:  Negative for cough and shortness of breath.    Cardiovascular:  Negative for leg swelling.   Gastrointestinal:  Negative for blood in stool, diarrhea, nausea and vomiting.   Genitourinary:  Negative for difficulty urinating, dysuria, flank pain and pelvic pain.   Skin:  Negative for rash and wound.   Psychiatric/Behavioral:  Positive for confusion (though oriented). Negative for agitation.      Objective:     Vital Signs (Most Recent):  Temp: 98 °F (36.7 °C) (08/13/24 1520)  Pulse: 71 (08/13/24 1520)  Resp: 18 (08/13/24 1520)  BP: (!) 192/87 (08/13/24 1520)  SpO2: 98 % (08/13/24 1520) Vital Signs (24h Range):  Temp:  [97.2 °F (36.2 °C)-98 °F (36.7 °C)] 98 °F (36.7 °C)  Pulse:  [68-75] 71  Resp:  [16-20] 18  SpO2:  [94 %-98 %] 98 %  BP: (161-195)/(55-89) 192/87     Weight: 82.5 kg (181 lb 14.1 oz)  Body mass index is 28.49 kg/m².    Estimated Creatinine Clearance: 36.2 mL/min (based on SCr of 1.3 mg/dL).     Physical Exam  Vitals reviewed.   Constitutional:       General: She is not in acute distress.     Appearance: Normal appearance. She is not ill-appearing.   HENT:      Head: Normocephalic.      Nose: Nose normal.      Mouth/Throat:      Mouth: Mucous membranes are moist.      Pharynx: Oropharynx is clear.   Eyes:      General:         Right eye: No discharge.         Left eye: No discharge.      Conjunctiva/sclera: Conjunctivae normal.   Cardiovascular:      Rate and Rhythm: Normal rate and regular rhythm.      Pulses: Normal pulses.      Heart sounds: Normal heart sounds. No murmur heard.  Pulmonary:      Effort: Pulmonary effort is normal. No respiratory distress.      Breath sounds: Normal  "breath sounds. No stridor.   Abdominal:      General: Abdomen is flat. There is no distension.      Palpations: Abdomen is soft.      Tenderness: There is no abdominal tenderness. There is no right CVA tenderness or left CVA tenderness.   Musculoskeletal:         General: Normal range of motion.      Cervical back: Normal range of motion.      Right lower leg: No edema.      Left lower leg: No edema.   Skin:     Findings: No lesion or rash.      Comments: With healed left hip incision, without surrounding swelling, redness. Without tenderness to palpation. Without drainage or underlying fluctuance.    Neurological:      Mental Status: She is alert and oriented to person, place, and time.      Comments: Mumbling speech, with intermittent confusion, but overall, alert and oriented x4    Psychiatric:         Mood and Affect: Mood normal.          Significant Labs: Blood Culture:   Recent Labs   Lab 08/04/24  1135 08/04/24  1202 08/07/24  1007   LABBLOO No growth after 5 days. No growth after 5 days. No growth after 5 days.     CBC:   No results for input(s): "WBC", "HGB", "HCT", "PLT" in the last 48 hours.    CMP:   No results for input(s): "NA", "K", "CL", "CO2", "GLU", "BUN", "CREATININE", "CALCIUM", "PROT", "ALBUMIN", "BILITOT", "ALKPHOS", "AST", "ALT", "ANIONGAP", "EGFRNONAA" in the last 48 hours.    Invalid input(s): "[place]"    Microbiology Results (last 7 days)       Procedure Component Value Units Date/Time    Culture, Anaerobic [[id number]]     Order Status: No result Specimen: Joint Fluid from Hip, Left     AFB Culture & Smear [[id number]]     Order Status: No result Specimen: Joint Fluid from Hip, Left     Fungus culture [[id number]]     Order Status: No result Specimen: Joint Fluid from Hip, Left     Gram stain [[id number]]     Order Status: No result Specimen: Joint Fluid from Hip, Left     Aerobic culture [[id number]]     Order Status: No result Specimen: Hip     Blood culture [[id number]] Collected: " 08/07/24 1007    Order Status: Completed Specimen: Blood Updated: 08/12/24 1212     Blood Culture, Routine No growth after 5 days.    Narrative:      Picc line          Urine Culture:   Recent Labs   Lab 08/04/24  1452   LABURIN ENTEROCOCCUS FAECIUM VRE  50,000 - 99,999 cfu/ml  *     Urine Studies:   Recent Labs   Lab 08/04/24  1452 08/09/24  0919   COLORU Yellow Yellow   APPEARANCEUA Clear Clear   PHUR 7.0 7.0   SPECGRAV 1.015 1.015   PROTEINUA 2+* 2+*   GLUCUA Negative Negative   KETONESU Negative Negative   BILIRUBINUA Negative Negative   OCCULTUA Trace* Trace*   NITRITE Negative Negative   UROBILINOGEN Negative  --    LEUKOCYTESUR Trace* Negative   RBCUA 4 3   WBCUA 8* 2   BACTERIA Many* Rare   SQUAMEPITHEL 4  --    HYALINECASTS 0 3*     All pertinent labs within the past 24 hours have been reviewed.    Significant Imaging: I have reviewed all pertinent imaging results/findings within the past 24 hours.  CT Pelvis With IV Contrast [[id number]] Resulted: 08/13/24 1415   Order Status: Completed Updated: 08/13/24 1418   Narrative:     EXAMINATION:  CT PELVIS WITH IV CONTRAST    CLINICAL HISTORY:  Evaluate pelvic fluid collection;    TECHNIQUE:  Low dose axial images, sagittal and coronal reformations were obtained from the iliac crests to the pubic symphysis after the administration of 75 cc Omnipaque 350 intravenous contrast.  Oral contrast was not administered.    COMPARISON:  CT abdomen pelvis 08/05/2024.  CT hip 08/04/2024. Hip radiograph 08/12/2024.    FINDINGS:  BOWEL/MESENTERY: No evidence of bowel obstruction or inflammatory process.    REPRODUCTIVE: Hysterectomy.    URINARY BLADDER: Mildly distended.  Small foci of intraluminal air, likely related to recent instrumentation.    VASCULATURE: No abdominal aortic aneurysm. No significant atherosclerotic calcification.    BONES/EXTRAPERITONEAL SOFT TISSUES: Recent postoperative change of left hip arthroplasty.  Hardware appears intact without perihardware  lucency.  Overlying irregular fluid collection measuring approximately 7.0 x 5.1 cm maximal axial dimension (axial image 54).  Surrounding stranding, tracking towards the cutaneous surface and inferiorly along the left quadriceps musculature.  Overall appearance is decreased in size compared to prior 08/05/2024.    Additional heterogeneous fluid collection tracking superiorly within the substance of the left iliopsoas, measuring 4.0 x 2.9 cm maximal axial dimension (axial image 28).  Hyperdense components suggestive of more recent blood products. Overall appearance is slightly increased in size compared to prior 08/05/2024, noting greater hypodense component compatible with aging blood products.    OTHER: Layering hyperattenuation in the partially visualized gallbladder, similar to prior.    Trace pelvic free fluid.   Impression:       Recent postoperative change of left hip arthroplasty.  Overlying irregular fluid collection appears decreased in size compared to prior CT 08/05/2024.    Presumed intramuscular hematoma within the left iliopsoas, appears slightly increased in size.  Hyperdense components suggestive of more recent blood products.  Hemorrhagic iliopsoas bursitis less likely.    Electronically signed by resident: Hansel Saucedo  Date: 08/13/2024  Time: 13:34    Electronically signed by: Lalo Orozco MD  Date: 08/13/2024  Time: 14:15     X-Ray Hip 2 or 3 views Left with Pelvis when performed [[id number]] Resulted: 08/12/24 1453   Order Status: Completed Updated: 08/12/24 1455   Narrative:     EXAMINATION:  XR HIP WITH PELVIS WHEN PERFORMED 2 OR 3 VIEWS LEFT    CLINICAL HISTORY:  left hip fluid collection per CT read;    TECHNIQUE:  AP view of the pelvis and frog leg lateral view of the left hip were performed.    COMPARISON:  CT hip August 4, 2024    FINDINGS:  There are surgical changes of total right hip arthroplasty.  The surrounding osseous structures are intact without fracture or osseous  destructive process.   Impression:       As above      Electronically signed by: Joceline Freire MD  Date: 08/12/2024  Time: 14:53   X-Ray Chest 1 View [[id number]] Resulted: 08/08/24 2126   Order Status: Completed Updated: 08/08/24 2128   Narrative:     EXAMINATION:  XR CHEST 1 VIEW    CLINICAL HISTORY:  concern for aspiration;    TECHNIQUE:  Single frontal view of the chest was performed.    COMPARISON:  08/04/2024.    FINDINGS:  There are continued coarse chronic interstitial opacities, similar prior.  There is no new focal consolidation or significant detrimental change from prior.  There are multiple surgical clips.  The pleural spaces are clear.  The cardiac silhouette is enlarged.  There are calcifications of the aortic arch.  Osseous structures demonstrate degenerative changes.   Impression:       No significant detrimental change from prior study.      Electronically signed by: Ernst Whipple  Date: 08/08/2024  Time: 21:26      Imaging History     2024    Date Procedure Name Study Review Link PACS Link Status Accession Number Location   08/12/24 02:09 PM X-Ray Hip 2 or 3 views Left with Pelvis when performed Study Review  Images Final [id number] [facility]   08/11/24 04:04 PM Cardiac monitoring strips Study Review  Final     08/09/24 01:21 AM Cardiac monitoring strips Study Review  Final     08/08/24 08:32 PM X-Ray Chest 1 View Study Review  Images Final [id number] [facility]   08/05/24 02:04 PM Echo Saline Bubble? No; Ultrasound enhancing contrast? No Study Review  Images Final [id number] [facility]   08/05/24 12:02 PM MRI Brain Without Contrast Study Review  Images Final [id number] [facility]   08/05/24 12:00 PM CT Abdomen Pelvis  Without Contrast Study Review  Images Final [id number] [facility]   08/04/24 06:55 PM CT Hip Without Contrast Left Study Review  Images Final [id number] [facility]   08/04/24 12:36 PM X-Ray Chest AP Portable Study Review  Images Final [id number] [facility]   08/04/24 12:30 PM CT Head Without Contrast Study Review  Images Final  [id number] [facility]

## 2024-08-13 NOTE — PROGRESS NOTES
"[facility] - Neurosurgery ([facility])  Infectious Disease  Progress Note    Patient Name: Shaye Clemente  MRN: [id number]  Admission Date: 8/6/2024  Length of Stay: 6 days  Attending Physician: Traci Mancini MD  Primary Care Provider: Skip Singh MD    Isolation Status: Contact  Assessment/Plan:      ID  Post op infection  84F with h/o pAFib (on xarelto), dementia, femoral neck fracture, s/p left bipolar hemiarthroplasty with hardware placed on 6/4/24 at [facility]. (Minimal records available at this time). Hosp course c/b bacteremia 06/24 1 of 2 bottles +proteus vulgaris (R-ampicillin, cefuroxime, tetracycline); and surgical wound infection. Left hip wound cx 06/27: +PSA (I-aztreonam), +Staph hemolyticus (R-tetracycline, S-vanc), +E.faecalis (panS), +proteus vulgaris (R-ampicillin, cefuroxime, Tetracycline). Pt underwent surgical debridement (no Op notes available) 07/01, surgical cxs NG.  Wound swab cxs 07/01 No growth. Also, records reveal ESBL-e.coli UTI 06/25. It seems pt has been maintained on Iv-Erta since then, despite not covering all bacteria isolated on cxs from infected surgical wound; and per family, pt was to complete prolonged course Erta ~6wks, with impending BRENDAN. -- Pt transferred to [facility] from [facility] for further work-up of unwitnessed seizure, and anemia. Unclear what provoked seizure. EEG was negative.    Ct-a/p and Ct-hip reveal stable hardware, but note non-specific soft-tissue fluid collection at lateral hip measuring 9.4 x 8.7 x 4.8cm, as well as IM hematoma of left iliopsoas, psoas, and iliacus muscle, with potential active hemorrhage.    ID was consulted as "Wound infection. Was on ertapenem at nursing home. Should this be continued" On 08/09, switched erta to merrem while awaiting additional records.    Stable.  Blood cultures finalized negative.  IR now following and is planning on aspiration or drain placement left hip.  Remains on meropenem.  " Afebrile    Recommendations:  Continue meropenem  Agree with I R consult for eval and for fluid aspiration to further guide antibiotic therapy  Follow up IR cultures  Follow up bone scan  Will need further records in order to give proper recommendation, specifically [facility] records including infectious disease and ortho surgery notes, as well as culture reports with sensitivities.       -- Discussed with ID staff and primary team   -- ID will continue to follow w/ further recs.              Anticipated Disposition: tbd    Thank you for your consult. I will follow-up with patient. Please contact us if you have any additional questions.    OCTAVIA Murphy  Infectious Disease  [facility] - Neurosurgery (Hospital)    Subjective:     Principal Problem:Seizure-like activity    HPI: Ms. Clemente is a 83 yo female with PMH of left femoral neck fracture, s/p left bipolar hemiarthroplasty on 6/4/24, paroxysmal atrial fibrillation (on Xarelto), hypothyroidism, dementia, and hyperlipidemia admitted to [facility] (from [facility]) on August 4 with low hemoglobin. While at OSH, work up was nonrevealing for source of anemia, pt had reported tarry stools, though stool hemoccult was negative. Pt reported swelling around her left hip, and CT left hip noted soft tissue swelling/fluid in deep left pelvis, presacral region. Pt was also treated empirically for cysitits with cipro. During a CT of her AP, pt had a witnessed seizure that required keppra. Neurology was consulted and MRI brain and EEG without acute findings. While at OSH, ortho surgery and interventional radiology was consulted at [facility] and surgical intervention was not planned, though recommended to continue close monitoring of H/H and if continues to drop recommending CTA of abdomen/pelvis to see if IR intervention is indicated. Ultimately, pt was transferred to [facility] for EEG, neurology eval 2/2 new onset seizures, as well as close monitoring in H/H.  "    Prior to admission to [facility], pt was sent to [facility] as there was concerns for left hip pain, redness, and swelling. Per limited records available, appears pt was bacteremic with proteus vulgaris on 6/24/24, also with Ecoli ESBL uti. It appears Dr. Dave Nascimento was following pt. Per ED notes on 7/25/24 from [facility], pt was noted that "pt underwent left hip debridement for post op left hip infection on July 1st, she had proteus in her tissues, culture later was positive for pseudomonas, staph hemolyticus, and enterococcus. Pt was only on rocephin, though it was planned to continue cefepime,  however it was discontinued and pt was started on rocephin alone."     ID was consulted d/t "Wound infection. Was on ertapenem at nursing home. Should this be continued"        Interval History:   No acute events overnight  IR consulted for left hip drainage/aspiration  Complains of left hip discomfort  Denies fevers chills or sweats    Review of Systems   Constitutional:  Negative for chills, diaphoresis, fatigue and fever.   HENT: Negative.     Respiratory:  Negative for cough and shortness of breath.    Cardiovascular:  Negative for leg swelling.   Gastrointestinal:  Negative for blood in stool, diarrhea, nausea and vomiting.   Genitourinary:  Negative for difficulty urinating, dysuria, flank pain and pelvic pain.   Skin:  Negative for rash and wound.   Psychiatric/Behavioral:  Positive for confusion (though oriented). Negative for agitation.      Objective:     Vital Signs (Most Recent):  Temp: 97.4 °F (36.3 °C) (08/12/24 1958)  Pulse: 69 (08/12/24 1958)  Resp: 18 (08/12/24 1958)  BP: (!) 170/55 (08/12/24 1958)  SpO2: 98 % (08/12/24 1958) Vital Signs (24h Range):  Temp:  [97.4 °F (36.3 °C)-98.7 °F (37.1 °C)] 97.4 °F (36.3 °C)  Pulse:  [69-83] 69  Resp:  [16-18] 18  SpO2:  [93 %-98 %] 98 %  BP: (142-182)/(55-90) 170/55     Weight: 82.5 kg (181 lb 14.1 oz)  Body mass index is 28.49 " "kg/m².    Estimated Creatinine Clearance: 36.2 mL/min (based on SCr of 1.3 mg/dL).     Physical Exam  Vitals reviewed.   Constitutional:       General: She is not in acute distress.     Appearance: Normal appearance. She is not ill-appearing.   HENT:      Head: Normocephalic.      Nose: Nose normal.      Mouth/Throat:      Mouth: Mucous membranes are moist.      Pharynx: Oropharynx is clear.   Eyes:      General:         Right eye: No discharge.         Left eye: No discharge.      Conjunctiva/sclera: Conjunctivae normal.   Cardiovascular:      Rate and Rhythm: Normal rate and regular rhythm.      Pulses: Normal pulses.      Heart sounds: Normal heart sounds. No murmur heard.  Pulmonary:      Effort: Pulmonary effort is normal. No respiratory distress.      Breath sounds: Normal breath sounds. No stridor.   Abdominal:      General: Abdomen is flat. There is no distension.      Palpations: Abdomen is soft.      Tenderness: There is no abdominal tenderness. There is no right CVA tenderness or left CVA tenderness.   Musculoskeletal:         General: Normal range of motion.      Cervical back: Normal range of motion.      Right lower leg: No edema.      Left lower leg: No edema.   Skin:     Findings: No lesion or rash.      Comments: With healed left hip incision, without surrounding swelling, redness. Without tenderness to palpation. Without drainage or underlying fluctuance.    Neurological:      Mental Status: She is alert and oriented to person, place, and time.      Comments: Mumbling speech, with intermittent confusion, but overall, alert and oriented x4    Psychiatric:         Mood and Affect: Mood normal.          Significant Labs: Blood Culture:   Recent Labs   Lab 08/04/24  1135 08/04/24  1202 08/07/24  1007   LABBLOO No growth after 5 days. No growth after 5 days. No growth after 5 days.     CBC:   No results for input(s): "WBC", "HGB", "HCT", "PLT" in the last 48 hours.    CMP:   No results for input(s): " ""NA", "K", "CL", "CO2", "GLU", "BUN", "CREATININE", "CALCIUM", "PROT", "ALBUMIN", "BILITOT", "ALKPHOS", "AST", "ALT", "ANIONGAP", "EGFRNONAA" in the last 48 hours.    Invalid input(s): "[place]"    Microbiology Results (last 7 days)       Procedure Component Value Units Date/Time    Culture, Anaerobic [[id number]]     Order Status: No result Specimen: Joint Fluid from Hip, Left     AFB Culture & Smear [[id number]]     Order Status: No result Specimen: Joint Fluid from Hip, Left     Fungus culture [[id number]]     Order Status: No result Specimen: Joint Fluid from Hip, Left     Gram stain [[id number]]     Order Status: No result Specimen: Joint Fluid from Hip, Left     Aerobic culture [[id number]]     Order Status: No result Specimen: Hip     Blood culture [[id number]] Collected: 08/07/24 1007    Order Status: Completed Specimen: Blood Updated: 08/12/24 1212     Blood Culture, Routine No growth after 5 days.    Narrative:      Picc line          Urine Culture:   Recent Labs   Lab 08/04/24  1452   LABURIN ENTEROCOCCUS FAECIUM VRE  50,000 - 99,999 cfu/ml  *     Urine Studies:   Recent Labs   Lab 08/04/24  1452 08/09/24  0919   COLORU Yellow Yellow   APPEARANCEUA Clear Clear   PHUR 7.0 7.0   SPECGRAV 1.015 1.015   PROTEINUA 2+* 2+*   GLUCUA Negative Negative   KETONESU Negative Negative   BILIRUBINUA Negative Negative   OCCULTUA Trace* Trace*   NITRITE Negative Negative   UROBILINOGEN Negative  --    LEUKOCYTESUR Trace* Negative   RBCUA 4 3   WBCUA 8* 2   BACTERIA Many* Rare   SQUAMEPITHEL 4  --    HYALINECASTS 0 3*     All pertinent labs within the past 24 hours have been reviewed.    Significant Imaging: I have reviewed all pertinent imaging results/findings within the past 24 hours.  X-Ray Hip 2 or 3 views Left with Pelvis when performed [[id number]] Resulted: 08/12/24 1453   Order Status: Completed Updated: 08/12/24 1455   Narrative:     EXAMINATION:  XR HIP WITH PELVIS WHEN PERFORMED 2 OR 3 VIEWS " LEFT    CLINICAL HISTORY:  left hip fluid collection per CT read;    TECHNIQUE:  AP view of the pelvis and frog leg lateral view of the left hip were performed.    COMPARISON:  CT hip August 4, 2024    FINDINGS:  There are surgical changes of total right hip arthroplasty.  The surrounding osseous structures are intact without fracture or osseous destructive process.   Impression:       As above      Electronically signed by: Joceline Freire MD  Date: 08/12/2024  Time: 14:53   X-Ray Chest 1 View [[id number]] Resulted: 08/08/24 2126   Order Status: Completed Updated: 08/08/24 2128   Narrative:     EXAMINATION:  XR CHEST 1 VIEW    CLINICAL HISTORY:  concern for aspiration;    TECHNIQUE:  Single frontal view of the chest was performed.    COMPARISON:  08/04/2024.    FINDINGS:  There are continued coarse chronic interstitial opacities, similar prior.  There is no new focal consolidation or significant detrimental change from prior.  There are multiple surgical clips.  The pleural spaces are clear.  The cardiac silhouette is enlarged.  There are calcifications of the aortic arch.  Osseous structures demonstrate degenerative changes.   Impression:       No significant detrimental change from prior study.      Electronically signed by: Ernst Whipple  Date: 08/08/2024  Time: 21:26      Imaging History     2024    Date Procedure Name Study Review Link PACS Link Status Accession Number Location   08/12/24 02:09 PM X-Ray Hip 2 or 3 views Left with Pelvis when performed Study Review  Images Final [id number] [facility]   08/11/24 04:04 PM Cardiac monitoring strips Study Review  Final     08/09/24 01:21 AM Cardiac monitoring strips Study Review  Final     08/08/24 08:32 PM X-Ray Chest 1 View Study Review  Images Final [id number] [facility]   08/05/24 02:04 PM Echo Saline Bubble? No; Ultrasound enhancing contrast? No Study Review  Images Final [id number] [facility]   08/05/24 12:02 PM MRI Brain Without Contrast Study Review  Images Final [id number] [facility]    08/05/24 12:00 PM CT Abdomen Pelvis  Without Contrast Study Review  Images Final [id number] [facility]   08/04/24 06:55 PM CT Hip Without Contrast Left Study Review  Images Final [id number] [facility]   08/04/24 12:36 PM X-Ray Chest AP Portable Study Review  Images Final [id number] [facility]   08/04/24 12:30 PM CT Head Without Contrast Study Review  Images Final [id number] [facility]

## 2024-08-13 NOTE — SUBJECTIVE & OBJECTIVE
Past Medical History:   Diagnosis Date    Mumps without complication     Paroxysmal atrial fibrillation        Past Surgical History:   Procedure Laterality Date    BREAST SURGERY Right     partial    HEMIARTHROPLASTY, HIP, POSTERIOR APPROACH Left 06/04/2024    HYSTERECTOMY      TONSILLECTOMY      VAGINECTOMY         Review of patient's allergies indicates:   Allergen Reactions    Prilosec [omeprazole]     Augmentin [amoxicillin-pot clavulanate] Nausea And Vomiting    Codeine Rash    Sulfa (sulfonamide antibiotics) Rash       Current Facility-Administered Medications   Medication    acetaminophen tablet 650 mg    albuterol-ipratropium 2.5 mg-0.5 mg/3 mL nebulizer solution 3 mL    aluminum-magnesium hydroxide-simethicone 200-200-20 mg/5 mL suspension 30 mL    atorvastatin tablet 40 mg    calcium carbonate 200 mg calcium (500 mg) chewable tablet 500 mg    carvediloL tablet 12.5 mg    docusate sodium capsule 100 mg    ferrous sulfate tablet 1 each    glucagon (human recombinant) injection 1 mg    hydrALAZINE injection 5 mg    hydrALAZINE tablet 25 mg    HYDROcodone-acetaminophen 5-325 mg per tablet 1 tablet    levETIRAcetam tablet 500 mg    levothyroxine tablet 25 mcg    LORazepam injection 2 mg    melatonin tablet 6 mg    memantine tablet 5 mg    meropenem 2 g in 0.9% NaCl 100 mL IVPB (MB+)    montelukast tablet 10 mg    naloxone 0.4 mg/mL injection 0.02 mg    ondansetron injection 4 mg    polyethylene glycol packet 17 g    prochlorperazine injection Soln 5 mg     Family History    None       Tobacco Use    Smoking status: Never    Smokeless tobacco: Never   Substance and Sexual Activity    Alcohol use: Not on file    Drug use: Not on file    Sexual activity: Not on file     ROS  Constitutional: negative for fevers  Eyes: negative visual changes  ENT: negative for hearing loss  Respiratory: negative for dyspnea  Cardiovascular: negative for chest pain  Gastrointestinal: negative for abdominal pain      Objective:  "    Vital Signs (Most Recent):  Temp: 97.5 °F (36.4 °C) (08/12/24 2338)  Pulse: 71 (08/12/24 2338)  Resp: 16 (08/12/24 2338)  BP: (!) 180/70 (08/12/24 2338)  SpO2: 95 % (08/12/24 2338) Vital Signs (24h Range):  Temp:  [97.4 °F (36.3 °C)-98.3 °F (36.8 °C)] 97.5 °F (36.4 °C)  Pulse:  [69-83] 71  Resp:  [16-18] 16  SpO2:  [93 %-98 %] 95 %  BP: (142-182)/(55-90) 180/70     Weight: 82.5 kg (181 lb 14.1 oz)  Height: 5' 7" (170.2 cm)  Body mass index is 28.49 kg/m².      Intake/Output Summary (Last 24 hours) at 8/13/2024 0012  Last data filed at 8/12/2024 0800  Gross per 24 hour   Intake --   Output 500 ml   Net -500 ml        Ortho/SPM Exam  Gen:  No acute distress, well-developed, well nourished.  CV:  Peripherally well-perfused.   Respiratory:  Normal respiratory effort. No accessory muscle use.   Head/Neck:  Normocephalic.  Atraumatic. Sclera anicteric. TM. Neck supple.  Neuro: No FND. Awake. Alert. Oriented to person, place, time, and situation.  Abdomen: Soft, NTND.      MSK:  Right Upper Extremity  Inspection  - Skin intact throughout, no open wounds  - No swelling  - No ecchymosis, erythema, or signs of cellulitis  Palpation  - NonTTP throughout, no palpable abnormality   Range of motion  - AROM and PROM of the shoulder, elbow, wrist, and hand intact  Stability  - No evidence of joint dislocation or abnormal laxity   Neurovascular  - AIN/PIN/Radial/Median/Ulnar Nerves assessed in isolation without deficit  - Able to give thumbs up, make "OK" sign, cross IF/LF, abduct/adduct fingers, make fist  - SILT throughout  - Compartments soft  - Radial artery 2+  - Muscle tone normal    Left Upper Extremity  Inspection  - Skin intact throughout, no open wounds  - No swelling  - No ecchymosis, erythema, or signs of cellulitis  Palpation  - NonTTP throughout, no palpable abnormality   Range of motion  - AROM and PROM of the shoulder, elbow, wrist, and hand intact  Stability  - No evidence of joint dislocation or abnormal " "laxity  Neurovascular  - AIN/PIN/Radial/Median/Ulnar Nerves assessed in isolation without deficit  - Able to give thumbs up, make "OK" sign, cross IF/LF, abduct/adduct fingers, make fist  - SILT throughout  - Compartments soft  - Radial artery 2+  - Muscle tone normal      Right Lower Extremity  Inspection  - Skin intact throughout, no open wounds  - No swelling  - No ecchymosis, erythema, or signs of cellulitis  Palpation  - NonTTP throughout, no palpable abnormality   Range of motion  - AROM and PROM of the hip, knee, ankle, and foot intact  Stability  - No evidence of joint dislocation or abnormal laxity,   Neurovascular  - TA/EHL/Gastroc/FHL assessed in isolation without deficit  - SILT throughout  - Compartments soft  - DP 2+   - Negative Log roll  - Negative Stinchfield  - Muscle tone normal    Left Lower Extremity  Inspection  - Skin intact throughout, no open wounds  - No swelling  - No ecchymosis, erythema, or signs of cellulitis  Palpation  - mild TTP at the hip  Range of motion  - AROM and PROM of the ankle, and foot intact  - ROM of the hip and knee limited due to pain  Neurovascular  - TA/EHL/Gastroc/FHL assessed in isolation without deficit  - SILT throughout  - Compartments soft  - DP 2+   - Positive Log roll  - Positive Stinchfield  - Muscle tone normal    Significant Imaging: I have reviewed and interpreted all pertinent imaging results/findings.  Xr pelvis revealed L hemiarthroplasty  "

## 2024-08-13 NOTE — ASSESSMENT & PLAN NOTE
Shaye Clemente is a 84 y.o. female presenting with left hip pain 2 months status post left hip maite arthroplasty at [facility].  She developed bacteremia and surgical site infection and had a and I and D of left hip 6 weeks ago. Records of surgery and treatment from previous hospital was not available for review. She continues to say endorsed left hip pain with rolling in bed.  She has not been able to put weight on the hip.  She is currently admitted for workup of seizures.  Imaging revealed presence of subcutaneous fluid over the lateral proximal thigh and possible hematoma collection around the iliopsoas tendon.  Inflammatory markers are elevated.  We will repeat CT pelvis to re-evaluate size of  fluid collection.  Consult IR aspiration of the fluid for analysis and culture.      - Antibiotics:  Per ID  - WBAT LLE  - Pain control: multimodal pain management  - PT/OT:  Recommend weightbear as tolerated  - NPO at midnight

## 2024-08-13 NOTE — ASSESSMENT & PLAN NOTE
"84F with h/o pAFib (on xarelto), dementia, femoral neck fracture, s/p left bipolar hemiarthroplasty with hardware placed on 6/4/24 at [facility]. (Minimal records available at this time). Hosp course c/b bacteremia 06/24 1 of 2 bottles +proteus vulgaris (R-ampicillin, cefuroxime, tetracycline); and surgical wound infection. Left hip wound cx 06/27: +PSA (I-aztreonam), +Staph hemolyticus (R-tetracycline, S-vanc), +E.faecalis (panS), +proteus vulgaris (R-ampicillin, cefuroxime, Tetracycline). Pt underwent surgical debridement (no Op notes available) 07/01, surgical cxs NG.  Wound swab cxs 07/01 No growth. Also, records reveal ESBL-e.coli UTI 06/25. It seems pt has been maintained on Iv-Erta since then, despite not covering all bacteria isolated on cxs from infected surgical wound; and per family, pt was to complete prolonged course Erta ~6wks, with impending BRENDAN. -- Pt transferred to [facility] from [facility] for further work-up of unwitnessed seizure, and anemia. Unclear what provoked seizure. EEG was negative.    Ct-a/p and Ct-hip reveal stable hardware, but note non-specific soft-tissue fluid collection at lateral hip measuring 9.4 x 8.7 x 4.8cm, as well as IM hematoma of left iliopsoas, psoas, and iliacus muscle, with potential active hemorrhage.    ID was consulted as "Wound infection. Was on ertapenem at nursing home. Should this be continued" On 08/09, switched erta to merrem while awaiting additional records.    Stable.  Blood cultures finalized negative.  IR now following and is planning on aspiration or drain placement left hip.  Remains on meropenem.  Afebrile    Recommendations:  Continue meropenem  Agree with I R consult for eval and for fluid aspiration to further guide antibiotic therapy  Follow up IR cultures  Follow up bone scan  Will need further records in order to give proper recommendation, specifically [facility] records including infectious disease and ortho surgery notes, as " well as culture reports with sensitivities.       -- Discussed with ID staff and primary team   -- ID will continue to follow w/ further recs.

## 2024-08-14 LAB
ALBUMIN SERPL BCP-MCNC: 2.2 G/DL (ref 3.5–5.2)
ALP SERPL-CCNC: 119 U/L (ref 55–135)
ALT SERPL W/O P-5'-P-CCNC: 11 U/L (ref 10–44)
ANION GAP SERPL CALC-SCNC: 7 MMOL/L (ref 8–16)
AST SERPL-CCNC: 27 U/L (ref 10–40)
BASOPHILS # BLD AUTO: 0.07 K/UL (ref 0–0.2)
BASOPHILS NFR BLD: 0.8 % (ref 0–1.9)
BILIRUB SERPL-MCNC: 0.6 MG/DL (ref 0.1–1)
BUN SERPL-MCNC: 22 MG/DL (ref 8–23)
CALCIUM SERPL-MCNC: 8.8 MG/DL (ref 8.7–10.5)
CHLORIDE SERPL-SCNC: 101 MMOL/L (ref 95–110)
CO2 SERPL-SCNC: 23 MMOL/L (ref 23–29)
CREAT SERPL-MCNC: 1.2 MG/DL (ref 0.5–1.4)
CRP SERPL-MCNC: 110.9 MG/L (ref 0–8.2)
DIFFERENTIAL METHOD BLD: ABNORMAL
EOSINOPHIL # BLD AUTO: 1.7 K/UL (ref 0–0.5)
EOSINOPHIL NFR BLD: 20.5 % (ref 0–8)
ERYTHROCYTE [DISTWIDTH] IN BLOOD BY AUTOMATED COUNT: 20.6 % (ref 11.5–14.5)
EST. GFR  (NO RACE VARIABLE): 44.9 ML/MIN/1.73 M^2
GLUCOSE SERPL-MCNC: 72 MG/DL (ref 70–110)
HCT VFR BLD AUTO: 31.4 % (ref 37–48.5)
HGB BLD-MCNC: 10 G/DL (ref 12–16)
IMM GRANULOCYTES # BLD AUTO: 0.19 K/UL (ref 0–0.04)
IMM GRANULOCYTES NFR BLD AUTO: 2.3 % (ref 0–0.5)
LYMPHOCYTES # BLD AUTO: 1.2 K/UL (ref 1–4.8)
LYMPHOCYTES NFR BLD: 14 % (ref 18–48)
MCH RBC QN AUTO: 31 PG (ref 27–31)
MCHC RBC AUTO-ENTMCNC: 31.8 G/DL (ref 32–36)
MCV RBC AUTO: 97 FL (ref 82–98)
MONOCYTES # BLD AUTO: 0.8 K/UL (ref 0.3–1)
MONOCYTES NFR BLD: 9.9 % (ref 4–15)
NEUTROPHILS # BLD AUTO: 4.4 K/UL (ref 1.8–7.7)
NEUTROPHILS NFR BLD: 52.5 % (ref 38–73)
NRBC BLD-RTO: 0 /100 WBC
PLATELET # BLD AUTO: 200 K/UL (ref 150–450)
PMV BLD AUTO: 9.6 FL (ref 9.2–12.9)
POCT GLUCOSE: 97 MG/DL (ref 70–110)
POTASSIUM SERPL-SCNC: 3.5 MMOL/L (ref 3.5–5.1)
PROT SERPL-MCNC: 8.3 G/DL (ref 6–8.4)
RBC # BLD AUTO: 3.23 M/UL (ref 4–5.4)
SODIUM SERPL-SCNC: 131 MMOL/L (ref 136–145)
WBC # BLD AUTO: 8.4 K/UL (ref 3.9–12.7)

## 2024-08-14 PROCEDURE — 80053 COMPREHEN METABOLIC PANEL: CPT | Performed by: HOSPITALIST

## 2024-08-14 PROCEDURE — 27000207 HC ISOLATION

## 2024-08-14 PROCEDURE — 63600175 PHARM REV CODE 636 W HCPCS: Performed by: STUDENT IN AN ORGANIZED HEALTH CARE EDUCATION/TRAINING PROGRAM

## 2024-08-14 PROCEDURE — 99232 SBSQ HOSP IP/OBS MODERATE 35: CPT | Mod: ,,, | Performed by: PHYSICIAN ASSISTANT

## 2024-08-14 PROCEDURE — 0JJW3ZZ INSPECTION OF LOWER EXTREMITY SUBCUTANEOUS TISSUE AND FASCIA, PERCUTANEOUS APPROACH: ICD-10-PCS | Performed by: STUDENT IN AN ORGANIZED HEALTH CARE EDUCATION/TRAINING PROGRAM

## 2024-08-14 PROCEDURE — 85025 COMPLETE CBC W/AUTO DIFF WBC: CPT | Performed by: HOSPITALIST

## 2024-08-14 PROCEDURE — 36415 COLL VENOUS BLD VENIPUNCTURE: CPT | Performed by: HOSPITALIST

## 2024-08-14 PROCEDURE — 25000003 PHARM REV CODE 250: Performed by: STUDENT IN AN ORGANIZED HEALTH CARE EDUCATION/TRAINING PROGRAM

## 2024-08-14 PROCEDURE — 25000003 PHARM REV CODE 250: Performed by: HOSPITALIST

## 2024-08-14 PROCEDURE — 86140 C-REACTIVE PROTEIN: CPT | Performed by: HOSPITALIST

## 2024-08-14 PROCEDURE — 11000001 HC ACUTE MED/SURG PRIVATE ROOM

## 2024-08-14 PROCEDURE — 97530 THERAPEUTIC ACTIVITIES: CPT | Mod: CQ

## 2024-08-14 PROCEDURE — 97116 GAIT TRAINING THERAPY: CPT | Mod: CQ

## 2024-08-14 RX ORDER — LIDOCAINE HYDROCHLORIDE 10 MG/ML
INJECTION, SOLUTION INFILTRATION; PERINEURAL
Status: COMPLETED | OUTPATIENT
Start: 2024-08-14 | End: 2024-08-14

## 2024-08-14 RX ADMIN — ATORVASTATIN CALCIUM 40 MG: 40 TABLET, FILM COATED ORAL at 09:08

## 2024-08-14 RX ADMIN — DOCUSATE SODIUM 100 MG: 100 CAPSULE, LIQUID FILLED ORAL at 09:08

## 2024-08-14 RX ADMIN — MEROPENEM 2 G: 2 INJECTION, POWDER, FOR SOLUTION INTRAVENOUS at 11:08

## 2024-08-14 RX ADMIN — CARVEDILOL 12.5 MG: 12.5 TABLET, FILM COATED ORAL at 09:08

## 2024-08-14 RX ADMIN — LIDOCAINE HYDROCHLORIDE 5 ML: 10 INJECTION, SOLUTION INFILTRATION; PERINEURAL at 10:08

## 2024-08-14 RX ADMIN — LEVETIRACETAM 500 MG: 500 TABLET, FILM COATED ORAL at 09:08

## 2024-08-14 RX ADMIN — MEMANTINE 5 MG: 5 TABLET ORAL at 09:08

## 2024-08-14 RX ADMIN — HYDROCODONE BITARTRATE AND ACETAMINOPHEN 1 TABLET: 5; 325 TABLET ORAL at 09:08

## 2024-08-14 RX ADMIN — POLYETHYLENE GLYCOL 3350 17 G: 17 POWDER, FOR SOLUTION ORAL at 09:08

## 2024-08-14 RX ADMIN — FERROUS SULFATE TAB EC 325 MG (65 MG FE EQUIVALENT) 1 EACH: 325 (65 FE) TABLET DELAYED RESPONSE at 09:08

## 2024-08-14 RX ADMIN — MONTELUKAST 10 MG: 10 TABLET, FILM COATED ORAL at 09:08

## 2024-08-14 NOTE — ASSESSMENT & PLAN NOTE
-appears to be Enterococcus, unclear if truly infectious given very low colony count  -ID does not recommend treating at this time.

## 2024-08-14 NOTE — CONSULTS
Radiology H&P      SUBJECTIVE:     Chief Complaint: anemia    History of Present Illness:  Shaye Clemente is a 84 y.o. female with PMHx including left hip hemiarthroplasty for closed comminuted femoral neck fx, paroxysmal afib (on Xarelto), hypothyroidism, dementia, and HLD who initially presented for anemia.    CT imaging 8/5/24 revealed subcutaneous fluid collection overlying the left hip as well as intramuscular hematoma of the left iliopsoas, psoas and iliacus muscles with potential active hemorrhage. IR consult was placed for possible drainage of subcutaneous fluid collection.      Past Medical History:   Diagnosis Date    Mumps without complication     Paroxysmal atrial fibrillation      Past Surgical History:   Procedure Laterality Date    BREAST SURGERY Right     partial    HEMIARTHROPLASTY, HIP, POSTERIOR APPROACH Left 06/04/2024    HYSTERECTOMY      TONSILLECTOMY      VAGINECTOMY         Home Meds:   Prior to Admission medications    Medication Sig Start Date End Date Taking? Authorizing Provider   0.9% NaCl PgBk 100 mL with ertapenem 1 gram SolR 1 g Inject 1 g into the vein once daily.  8/12/24  Provider, Historical   albuterol-ipratropium (DUO-NEB) 2.5 mg-0.5 mg/3 mL nebulizer solution Take 3 mLs by nebulization every 3 (three) hours as needed for Wheezing. Rescue    Provider, Historical   amino acids-protein hydrolys (PRO-STAT AW)  gram-kcal/30 mL Liqd Take 30 mLs by mouth 2 (two) times a day. For wound healing.    Provider, Historical   betamethasone valerate 0.1% (VALISONE) 0.1 % Crea Apply 0.1 % topically 2 (two) times daily. Apply to posterior trunk/back    Provider, Historical   CALCIUM CARBONATE ORAL Take 1 tablet by mouth 2 (two) times daily as needed for Heartburn.    Provider, Historical   diphenhydrAMINE (BENADRYL) 25 mg capsule Take 25 mg by mouth every 6 (six) hours as needed for Itching.    Provider, Historical   docusate sodium (COLACE) 100 MG capsule Take 100 mg by mouth 2 (two) times  daily.    Provider, Historical   DULoxetine (CYMBALTA) 30 MG capsule Take 30 mg by mouth 2 (two) times daily.    Provider, Historical   ferrous sulfate (FEOSOL) 325 mg (65 mg iron) Tab tablet Take 325 mg by mouth daily with breakfast.    Provider, Historical   HYDROcodone-acetaminophen (NORCO) 5-325 mg per tablet Take 1 tablet by mouth every 6 (six) hours as needed for Pain.    Provider, Historical   ibandronate (BONIVA) 150 mg tablet Take 150 mg by mouth every 30 days. 7/15/24   Provider, Historical   levothyroxine (SYNTHROID) 25 MCG tablet Take 25 mcg by mouth before breakfast.    Provider, Historical   memantine (NAMENDA) 10 MG Tab Take 10 mg by mouth 2 (two) times daily.    Provider, Historical   metoprolol succinate 50 mg CSpX Take 50 mg by mouth once daily.    Provider, Historical   montelukast (SINGULAIR) 10 mg tablet Take 10 mg by mouth every evening.    Provider, Historical   polyethylene glycol (GLYCOLAX) 17 gram/dose powder Take 17 g by mouth once daily.    Provider, Historical   rivaroxaban (XARELTO) 20 mg Tab Take 20 mg by mouth daily with dinner or evening meal.    Provider, Historical   rosuvastatin (CRESTOR) 10 MG tablet Take 10 mg by mouth once daily.    Provider, Historical   Saccharomyces boulardii (FLORASTOR) 250 mg capsule Take 250 mg by mouth 2 (two) times daily.    Provider, Historical   tiotropium-olodateroL (STIOLTO RESPIMAT) 2.5-2.5 mcg/actuation Mist Inhale 2 puffs into the lungs Daily. Controller    Provider, Historical     Anticoagulants/Antiplatelets: no anticoagulation    Allergies:   Review of patient's allergies indicates:   Allergen Reactions    Prilosec [omeprazole]     Augmentin [amoxicillin-pot clavulanate] Nausea And Vomiting    Codeine Rash    Sulfa (sulfonamide antibiotics) Rash           OBJECTIVE:     Vital Signs (Most Recent)  Temp: 97.3 °F (36.3 °C) (08/14/24 0833)  Pulse: 65 (08/14/24 1044)  Resp: 17 (08/14/24 1044)  BP: (!) 140/65 (08/14/24 1044)  SpO2: 99 % (08/14/24  1044)    Physical Exam  Constitutional:       General: She is not in acute distress.  HENT:      Mouth/Throat:      Mouth: Mucous membranes are moist.   Eyes:      Extraocular Movements: Extraocular movements intact.   Pulmonary:      Effort: Pulmonary effort is normal. No respiratory distress.      Comments: Nasal cannula placed  Abdominal:      General: Abdomen is flat. There is no distension.      Tenderness: There is no abdominal tenderness.   Musculoskeletal:         General: No swelling or tenderness.   Skin:     General: Skin is warm.      Coloration: Skin is not jaundiced.   Neurological:      General: No focal deficit present.      Mental Status: She is alert and oriented to person, place, and time.   Psychiatric:         Mood and Affect: Mood normal.          ASA: 3  Mallampati: 3    Laboratory  Lab Results   Component Value Date    INR 1.4 (H) 08/07/2024       Lab Results   Component Value Date    WBC 11.45 08/09/2024    HGB 10.2 (L) 08/09/2024    HCT 31.8 (L) 08/09/2024    MCV 97 08/09/2024     08/09/2024      Lab Results   Component Value Date     (H) 08/09/2024     08/09/2024    K 3.9 08/09/2024     08/09/2024    CO2 24 08/09/2024    BUN 16 08/09/2024    CREATININE 1.3 08/09/2024    CALCIUM 8.8 08/09/2024    MG 1.8 08/09/2024    ALT 11 08/09/2024    AST 26 08/09/2024    ALBUMIN 2.6 (L) 08/09/2024    BILITOT 0.9 08/09/2024       ASSESSMENT/PLAN:     Will proceed with aspiration of the left lateral soft tissue fluid collection surrounding the hip under US-guidance.  Local lidocaine only to be used.     Nargis Herrera MD  Radiology, PGY IV  [facility]

## 2024-08-14 NOTE — PLAN OF CARE
Patient staffed in rounds.  Patient getting an abscess drained today and is not ready for discharge.  DEON spoke with Gladys at [facility].  They can no longer hold the patient's bed, but will have beds later this week and early next week.  They will need to resubmit for auth.  DEON provided update on medical condition.      Gladys Peterson LMSW  [facility]  [phone]

## 2024-08-14 NOTE — PT/OT/SLP PROGRESS
"Physical Therapy Treatment    Patient Name:  Shaye Clemente   MRN:  [id number]    Recommendations:     Discharge Recommendations: Moderate Intensity Therapy  Discharge Equipment Recommendations: hospital bed, lift device, wheelchair  Barriers to discharge:  Increased level of assistance     Assessment:     Shaye Clemente is a 84 y.o. female admitted with a medical diagnosis of Seizure-like activity.  She presents with the following impairments/functional limitations: weakness, impaired endurance, impaired self care skills, impaired functional mobility, gait instability, impaired balance, pain, decreased safety awareness, decreased lower extremity function, impaired fine motor, impaired cognition, orthopedic precautions, decreased coordination.    Pt met with HOB elevated and agreeable to session. Pt tolerates session fairly with emphasis on bed mobility, transfers, and gait training. Pt making progress towards therapy goals as indicated by decreased assistance required with bed mobility compared to previous session. Pt remains limited by LLE pain and self limiting behaviors. Pt will continue to benefit from skilled therapy services.    Rehab Prognosis: Good; patient would benefit from acute skilled PT services to address these deficits and reach maximum level of function.    Recent Surgery: * No surgery found *      Plan:     During this hospitalization, patient to be seen 4 x/week to address the identified rehab impairments via gait training, therapeutic activities, therapeutic exercises, neuromuscular re-education and progress toward the following goals:    Plan of Care Expires:  09/07/24    Subjective     Chief Complaint: "I didn't do good"  Patient/Family Comments/goals: "I want to go home tomorrow"   Pain/Comfort:  Pain Rating 1: 0/10 (pain not rated)  Location - Side 1: Left  Location - Orientation 1: generalized  Location 1: hip  Pain Addressed 1: Reposition, Distraction  Pain Rating Post-Intervention 1: other (see " comments) (pain not rated)      Objective:     Communicated with RN (Ramsey) prior to session.  Patient found HOB elevated with PureWick, oxygen upon PTA entry to room.     General Precautions: Standard, fall, contact  Orthopedic Precautions: LLE weight bearing as tolerated  Braces: N/A  Respiratory Status: Nasal cannula, flow 2 L/min    Cognition:   Orientation:  Affect: pleasant and cooperative  Alertness: eyes open 100 % of session  Insight: good insight into deficits  Attention: attends to task  Command Following: patient follows 90 % of 1-step commands  Communication:  Sequencing: Impaired: cues required sequencing of steps and weight shifting       Functional Mobility:  Bed Mobility:     Rolling Left:  minimum assistance  Scooting: anteriorly to EOB minimum assistance  Supine to Sit: moderate assistance for Trunk  Sit to Supine: maximal assistance x2 persons for Trunk/BLEs  Transfers:     Sit to Stand:  x5 trials from EOB minimum assistance  x2 persons  X1 trial with SIMIN HHA and SIMIN knees blocked  X4 trials with RW  Pre Gait:  X2 trials of marching in place (~5 times each LE)  Pt with decreased foot clearance off the floor  Pt cued for step sequence and weight shifting  Gait:   Pt ambulates 4 steps L with minimum assistance and rolling walker  Deviations noted: decreased weight shift, decreased foot clearance, decreased step length, flexed posture, decreased hip extension  Verbal and tactile cues provided for step sequence, weight shifting, upright posture, hip extension, scapular retraction  All lines remained intact and gait belt utilized.    Balance:  Sitting Balance at EOB:  Level of Assist Required: Moderate Assistance  Deviations noted: R posterolateral lean, offloading left hip  Total Time Sitting EOB: ~5 minutes    Standing Balance:  Level of Assistance Required: Minimum Assistance  Deviations Noted: Flexed posture, decreased hip extension, decreased BUE support  Verbal and tactile cues provided to  fully extend elbows onto RW and facilitate hip extension and scapular retraction.   Total Time Standing: ~1 minute        AM-PAC 6 CLICK MOBILITY  Turning over in bed (including adjusting bedclothes, sheets and blankets)?: 3  Sitting down on and standing up from a chair with arms (e.g., wheelchair, bedside commode, etc.): 3  Moving from lying on back to sitting on the side of the bed?: 3  Moving to and from a bed to a chair (including a wheelchair)?: 2  Need to walk in hospital room?: 1  Climbing 3-5 steps with a railing?: 1  Basic Mobility Total Score: 13       Treatment & Education:  Patient provided with daily orientation and goals of this PT session.     Pt educated to call for assistance and to transfer with hospital staff only.  Also, pt was educated on the effects of prolonged immobility and the importance of performing OOB activity and exercises to promote healing and reduce recovery time.    Patient left HOB elevated with all lines intact, call button in reach, bed alarm on, and RN notified..    GOALS:   Multidisciplinary Problems       Physical Therapy Goals          Problem: Physical Therapy    Goal Priority Disciplines Outcome Goal Variances Interventions   Physical Therapy Goal     PT, PT/OT Progressing     Description: Goals to be met by: 24     Patient will increase functional independence with mobility by performin. Supine to sit with MInimal Assistance  2. Sit to supine with MInimal Assistance  3. Sit to stand transfer with Minimal Assistance  4. Bed to chair transfer with Minimal Assistance using LRAD as needed  5. Gait  x 150 feet with Minimal Assistance using LRAD as needed.   6. Ascend/descend 3 stair with no Handrails and Minimal Assistance  7. Lower extremity exercise program x15 reps per handout, with assistance as needed    Hospital Bed - Patient requires a hospital bed for positioning of the body in ways that are not feasible with an ordinary bed. The patient requires special  positioning for pain relief, limited mobility, and/or being unable to independently make changes in body position without the use of a hospital bed. Pillows and wedges will not be adequate for resolving these positional issues.     Wheelchair - Patient has a mobility limitation that significantly impairs their ability to participate in one or more mobility related activities of daily living in customary locations in the home. The mobility limitation cannot be sufficiently resolved by the use of a cane or walker. The use of a manual wheelchair will greatly improve the patient's ability to participate in MRADLs. The patient will use the wheelchair on a regular basis at home. They have expressed their willingness to use a manual wheelchair in the home, and have a caregiver who is available and willing to assist with the wheelchair if needed.                         Time Tracking:     PT Received On: 08/14/24  PT Start Time: 1439     PT Stop Time: 1502  PT Total Time (min): 23 min     Billable Minutes: Gait Training 8 and Therapeutic Activity 15    Treatment Type: Treatment  PT/PTA: PTA     Number of PTA visits since last PT visit: 1 08/14/2024

## 2024-08-14 NOTE — PROGRESS NOTES
Yossi Vargas - Neurosurgery ([facility])  [facility] Medicine  Progress Note    Patient Name: Shaye Clemente  MRN: [id number]  Patient Class: IP- Inpatient   Admission Date: 8/6/2024  Length of Stay: 8 days  Attending Physician: Traci Mancini MD  Primary Care Provider: Skip Singh MD        Subjective:     Principal Problem:Seizure-like activity        HPI:  84-year-old female with a left hip hemiarthroplasty in June (by report had hemiarthroplasty of closed comminuted femoral neck hip fracture), paroxysmal atrial fibrillation (on Xarelto), hypothyroidism, dementia, and hyperlipidemia admitted to [facility] on August 4 with low hemoglobin.  Patient reported tarry stools for several days, but Hemoccult was negative in the emergency department.  She also noted discomfort and swelling around her left hip.  Labs in the emergency department were concerning for anemia with hemoglobin 6.5.  There was also concern for possible cystitis, and she was placed on ciprofloxacin.  CT of the head had no acute abnormalities, and CT of the left hip showed soft tissue fullness and fluid in the deep left pelvis and presacral region.  Initial EKG showed sinus rhythm.  She was transfused 2 units packed red blood cells and admitted to [facility].  With transfusion, hemoglobin increased to 9.3.  She was confused during her stay.  She was taken for CT of the abdomen and pelvis on the morning of August 5 when she began to have seizure activity.  She had fluttering of her eyes and movement of her head along with dorsiflexion of both feet.  She was given Keppra and placed on twice daily Keppra.  MRI brain had no acute intracranial abnormality.     She underwent Neurology tele-consultation, and recommendations included additional Keppra along with Q 4 hour neuro checks and routine EEG.  She was lethargic and required repeated stimulation for her to participate during examination.  She was oriented to person and time.  She was able  to follow simple commands.  Mentation appears to be slowly improving.      She underwent noncontrasted CT of the abdomen and pelvis that showed a subcutaneous fluid collection overlying the left hip which may represent resolving hematoma or postsurgical seroma.  There were also findings suspicious for intramuscular hematoma in the left iliopsoas, psoas, and iliacus.  Transfer center spoke with Orthopedic Surgery at [facility].  At present, there does not appear to be surgical intervention indicated in the left hip.  Case also discussed with Interventional Radiology at [facility].  Monitor the trend in hemoglobin and hemodynamic status.  If she remains stable, continue monitoring.  If she has a significant drop in hemoglobin or show signs of hemodynamic instability, she may need CTA of the abdomen and pelvis (bleed protocol) to determine if IR intervention is indicated. Pt. Transferred toHospital Medicine at [facility] in step-down status for EEG and Neurology evaluation of new onset seizures as well as monitoring the trend in her hemoglobin.     August 5:  Sodium 135, potassium 3.2, chloride 104, CO2 23, BUN 12, creatinine 1.7, glucose 84, calcium 8.4, magnesium 1.4, phosphorus 3.2, AST 20, ALT, white blood cells 7.13, hemoglobin 9.3, hematocrit 27.9, platelets 242  -repeat CBC on the afternoon of August 5 had hemoglobin 10.7 and hematocrit 32.7 (improved from earlier).  -CT abdomen and pelvis without contrast noted small hypoattenuating nodule in the right hepatic lobe may represent a cyst.  Diverticulosis.  Recent right total left hip arthroplasty with poorly defined subcutaneous fluid collection overlying the left hip which may represent resolving hematoma or postsurgical seroma.  Postsurgical abscess not excludable.  Findings suspicious for intramuscular hematoma of the left iliopsoas, psoas, and iliacus muscles with potential active hemorrhage.  -MRI brain was motion limited.  No acute  intracranial abnormality.  Cortical atrophy with periventricular deep white matter change consistent with chronic small-vessel ischemic disease.     August 4: Blood cultures with no growth to date, INR 1.8, iron saturation 19%, stool for occult blood negative, lactic acid 0.7, hemoglobin 6.7, hematocrit 20.5, procalcitonin 0.26  -urinalysis with 2+ protein, trace blood, trace leukocytes, 4 RBC, 8 WBC, many bacteria  -chest x-ray had no acute cardiopulmonary process identified  -CT head had no hemorrhage or other acute intracranial CT findings.  -CT left hip showed nonspecific soft tissue fullness to lateral hip measuring 9.4 x 8.7 x 4.8 cm.  Partially imaged slightly hyperattenuating fluid in the left deep pelvis and presacral region which may represent blood products.  Postsurgical changes of left hip arthroplasty.  -EKG showed normal sinus rhythm and appeared to be fairly normal.    Overview/Hospital Course:  Patient maintained stable hemoglobin since admission to [facility].  Working well with speech therapy, tolerating bite size soft diet.  TSH noted to be markedly elevated up to 19, started on Synthroid    Interval History:   8/14: Patient to IR to attempt aspiration but appears its just residual blood from her prior bleeding issues.     Review of Systems   Constitutional:  Negative for activity change, appetite change, chills, fever and unexpected weight change.   HENT:  Negative for congestion and sore throat.    Respiratory:  Negative for cough and shortness of breath.    Cardiovascular:  Negative for chest pain, palpitations and leg swelling.   Gastrointestinal:  Negative for abdominal distention, abdominal pain, blood in stool, constipation, diarrhea, nausea and vomiting.   Genitourinary:  Negative for difficulty urinating, dysuria and hematuria.   Musculoskeletal:  Negative for arthralgias and myalgias.   Skin:  Negative for color change and rash.   Neurological:  Negative for dizziness, tremors and  seizures.   Psychiatric/Behavioral:  Positive for confusion.      Objective:     Vital Signs (Most Recent):  Temp: 97.3 °F (36.3 °C) (08/14/24 0833)  Pulse: 62 (08/14/24 1059)  Resp: 17 (08/14/24 1059)  BP: (!) 127/59 (08/14/24 1059)  SpO2: 98 % (08/14/24 1059) Vital Signs (24h Range):  Temp:  [97.3 °F (36.3 °C)-98.5 °F (36.9 °C)] 97.3 °F (36.3 °C)  Pulse:  [61-73] 62  Resp:  [14-20] 17  SpO2:  [96 %-99 %] 98 %  BP: (127-192)/(57-87) 127/59     Weight: 82.5 kg (181 lb 14.1 oz)  Body mass index is 28.49 kg/m².    Intake/Output Summary (Last 24 hours) at 8/14/2024 1356  Last data filed at 8/14/2024 1315  Gross per 24 hour   Intake --   Output 925 ml   Net -925 ml         Physical Exam  Vitals reviewed.   Constitutional:       General: She is not in acute distress.     Appearance: She is well-developed.   HENT:      Head: Normocephalic and atraumatic.      Comments: EEG in place.   Eyes:      Extraocular Movements: Extraocular movements intact.      Pupils: Pupils are equal, round, and reactive to light.   Neck:      Vascular: No JVD.      Trachea: No tracheal deviation.   Cardiovascular:      Rate and Rhythm: Normal rate and regular rhythm.      Heart sounds: No murmur heard.     No friction rub. No gallop.   Pulmonary:      Effort: No respiratory distress.      Breath sounds: Normal breath sounds. No wheezing or rales.   Abdominal:      General: Bowel sounds are normal. There is no distension.      Palpations: Abdomen is soft. There is no mass.      Tenderness: There is no abdominal tenderness.   Musculoskeletal:         General: No deformity.      Cervical back: Neck supple.   Lymphadenopathy:      Cervical: No cervical adenopathy.   Skin:     General: Skin is warm and dry.      Findings: No rash.   Neurological:      Mental Status: She is alert. She is disoriented.      Cranial Nerves: Cranial nerves 2-12 are intact.      Deep Tendon Reflexes:      Reflex Scores:       Tricep reflexes are 2+ on the right side and  2+ on the left side.       Bicep reflexes are 2+ on the right side and 2+ on the left side.       Patellar reflexes are 2+ on the right side and 2+ on the left side.     Comments: Alert to person, place in hospital and year but not date/month/circumstances behind hospitalization, mildly confused   Psychiatric:         Speech: Speech is slurred.             Significant Labs: All pertinent labs within the past 24 hours have been reviewed.    Significant Imaging: I have reviewed all pertinent imaging results/findings within the past 24 hours.    Assessment/Plan:      * Seizure-like activity  -Reported seizure at [facility] prior to transfer pt. Was post-ictal and remains confused  -MRI brain uremarkable  -EEG so far unremarkable for any seizure activity   -currently on Keppra 500, follow up Neurology recommendations   -seizure precautions, fall precautions   -neuro checks      Delirium  Intermittent.       Antibiotic long-term use  Currently on Meropenem per ID recs.       Hemorrhagic disorder due to circulating anticoagulants  Retropsoas bleeding on admission requiring multiple units of blood product.       Chronic anticoagulation  On xarelto at home      Hypertension  Pressures noted to be in the 180s systolic   Continue home Toprol   Follow up with pharmacy and family regarding home medication list    Dementia  Neuro checks   Fall precautions   Seizure precautions  Memantine   Follow up with pharmacy consultation and family input for home medication list    Hypothyroidism  Starting Synthroid 88 mcg   Following up with a pharmacy consult for home medication reconciliation      LUIS CARLOS (acute kidney injury)  -unclear if baseline, creatinine initially elevated at 2.0 now improving down to 1.4,   -stopping IV fluids    Encephalopathy, metabolic  -Pt. With intermittent confusion, seizure activity. Mentation seems to be improving with time after seizure   -do not suspect true UTI as cause given very low colony  "count  -MRI brain negative for acute findings.   -TSH markedly elevated, see below for further treatment, could have been easily contributing factor  -Delirium precautions ordered    Hip hematoma, left  -Pt. Presented anemia, required 1 unit pRBC, Hgb now stable ~10. CT abdomen conerning for subcutaneous fluid collection overlying the left hip may represent resolving hematoma or postsurgical seroma andFindings suspicious for an intramuscular hematoma of the left iliopsoas, psoas and iliacus muscles with potential active hemorrhage.  -Case discussed with IR and ortho prior to transfer, plan "Monitor the trend in hemoglobin and hemodynamic status. If she remains stable, continue monitoring. If she has a significant drop in hemoglobin or show signs of hemodynamic instability, she may need CTA of the abdomen and pelvis (bleed protocol) to determine if IR intervention is indicated"          Paroxysmal atrial fibrillation  -Hx of pAfib. EKG on admit showed NSR, rhythm currently regular  -Continue home metoprolol and monitor on telemetry.  Holding Xarelto due to bleeding concerns-this was affirmed by the daughter via phone    Acute cystitis with hematuria  -appears to be Enterococcus, unclear if truly infectious given very low colony count  -ID does not recommend treating at this time.         VTE Risk Mitigation (From admission, onward)           Ordered     Place sequential compression device  Until discontinued         08/06/24 1811                    Discharge Planning   KATI: 8/16/2024     Code Status: DNR   Is the patient medically ready for discharge?:     Reason for patient still in hospital (select all that apply): Patient trending condition  Discharge Plan A: Skilled Nursing Facility                  Traci Mancini MD  Department of Hospital Medicine   [facility] - Neurosurgery ([facility])    "

## 2024-08-14 NOTE — SUBJECTIVE & OBJECTIVE
Interval History:   No acute events overnight  IR consulted for left hip- drainage/aspiration attempted and was unsuccessful.  Reports minimal left hip discomfort  Ortho Sx following  Denies dysuria, fevers chills or sweats    Review of Systems   Constitutional:  Negative for chills, diaphoresis, fatigue and fever.   HENT: Negative.     Respiratory:  Negative for cough and shortness of breath.    Cardiovascular:  Negative for leg swelling.   Gastrointestinal:  Negative for blood in stool, diarrhea, nausea and vomiting.   Genitourinary:  Negative for difficulty urinating, dysuria, flank pain and pelvic pain.   Skin:  Negative for rash and wound.   Psychiatric/Behavioral:  Positive for confusion (though oriented). Negative for agitation.      Objective:     Vital Signs (Most Recent):  Temp: 97.5 °F (36.4 °C) (08/14/24 1533)  Pulse: 73 (08/14/24 1533)  Resp: 18 (08/14/24 1533)  BP: 127/60 (08/14/24 1533)  SpO2: 98 % (08/14/24 1533) Vital Signs (24h Range):  Temp:  [97.3 °F (36.3 °C)-98.5 °F (36.9 °C)] 97.5 °F (36.4 °C)  Pulse:  [61-73] 73  Resp:  [14-20] 18  SpO2:  [96 %-99 %] 98 %  BP: (127-179)/(57-81) 127/60     Weight: 82.5 kg (181 lb 14.1 oz)  Body mass index is 28.49 kg/m².    Estimated Creatinine Clearance: 39.3 mL/min (based on SCr of 1.2 mg/dL).     Physical Exam  Vitals reviewed.   Constitutional:       General: She is not in acute distress.     Appearance: Normal appearance. She is not ill-appearing.   HENT:      Head: Normocephalic.      Nose: Nose normal.      Mouth/Throat:      Mouth: Mucous membranes are moist.      Pharynx: Oropharynx is clear.   Eyes:      General:         Right eye: No discharge.         Left eye: No discharge.      Conjunctiva/sclera: Conjunctivae normal.   Cardiovascular:      Rate and Rhythm: Normal rate and regular rhythm.      Pulses: Normal pulses.      Heart sounds: Normal heart sounds. No murmur heard.  Pulmonary:      Effort: Pulmonary effort is normal. No respiratory  distress.      Breath sounds: Normal breath sounds. No stridor.   Abdominal:      General: Abdomen is flat. There is no distension.      Palpations: Abdomen is soft.      Tenderness: There is no abdominal tenderness. There is no right CVA tenderness or left CVA tenderness.   Musculoskeletal:         General: Normal range of motion.      Cervical back: Normal range of motion.      Right lower leg: No edema.      Left lower leg: No edema.   Skin:     Findings: No lesion or rash.      Comments: With healed left hip incision, without surrounding swelling, redness. Without tenderness to palpation. Without drainage or underlying fluctuance.    Neurological:      Mental Status: She is alert and oriented to person, place, and time.      Comments: Mumbling speech resolved and more awake/ alert/ conversant and oriented x4    Psychiatric:         Mood and Affect: Mood normal.          Significant Labs: Blood Culture:   Recent Labs   Lab 08/04/24  1135 08/04/24  1202 08/07/24  1007   LABBLOO No growth after 5 days. No growth after 5 days. No growth after 5 days.     CBC:   Recent Labs   Lab 08/14/24  0925   WBC 8.40   HGB 10.0*   HCT 31.4*          CMP:   Recent Labs   Lab 08/14/24  0925   *   K 3.5      CO2 23   GLU 72   BUN 22   CREATININE 1.2   CALCIUM 8.8   PROT 8.3   ALBUMIN 2.2*   BILITOT 0.6   ALKPHOS 119   AST 27   ALT 11   ANIONGAP 7*       Microbiology Results (last 7 days)       Procedure Component Value Units Date/Time    Culture, Anaerobic [[id number]]     Order Status: No result Specimen: Joint Fluid from Hip, Left     AFB Culture & Smear [[id number]]     Order Status: No result Specimen: Joint Fluid from Hip, Left     Fungus culture [[id number]]     Order Status: No result Specimen: Joint Fluid from Hip, Left     Gram stain [[id number]]     Order Status: No result Specimen: Joint Fluid from Hip, Left     Aerobic culture [[id number]]     Order Status: No result Specimen: Hip     Blood culture  [[id number]] Collected: 08/07/24 1007    Order Status: Completed Specimen: Blood Updated: 08/12/24 1212     Blood Culture, Routine No growth after 5 days.    Narrative:      Picc line          Urine Culture:   Recent Labs   Lab 08/04/24  1452   LABURIN ENTEROCOCCUS FAECIUM VRE  50,000 - 99,999 cfu/ml  *     Urine Studies:   Recent Labs   Lab 08/04/24  1452 08/09/24  0919   COLORU Yellow Yellow   APPEARANCEUA Clear Clear   PHUR 7.0 7.0   SPECGRAV 1.015 1.015   PROTEINUA 2+* 2+*   GLUCUA Negative Negative   KETONESU Negative Negative   BILIRUBINUA Negative Negative   OCCULTUA Trace* Trace*   NITRITE Negative Negative   UROBILINOGEN Negative  --    LEUKOCYTESUR Trace* Negative   RBCUA 4 3   WBCUA 8* 2   BACTERIA Many* Rare   SQUAMEPITHEL 4  --    HYALINECASTS 0 3*     All pertinent labs within the past 24 hours have been reviewed.    Significant Imaging: I have reviewed all pertinent imaging results/findings within the past 24 hours.  CT Pelvis With IV Contrast [[id number]] Resulted: 08/13/24 1415   Order Status: Completed Updated: 08/13/24 1418   Narrative:     EXAMINATION:  CT PELVIS WITH IV CONTRAST    CLINICAL HISTORY:  Evaluate pelvic fluid collection;    TECHNIQUE:  Low dose axial images, sagittal and coronal reformations were obtained from the iliac crests to the pubic symphysis after the administration of 75 cc Omnipaque 350 intravenous contrast.  Oral contrast was not administered.    COMPARISON:  CT abdomen pelvis 08/05/2024.  CT hip 08/04/2024. Hip radiograph 08/12/2024.    FINDINGS:  BOWEL/MESENTERY: No evidence of bowel obstruction or inflammatory process.    REPRODUCTIVE: Hysterectomy.    URINARY BLADDER: Mildly distended.  Small foci of intraluminal air, likely related to recent instrumentation.    VASCULATURE: No abdominal aortic aneurysm. No significant atherosclerotic calcification.    BONES/EXTRAPERITONEAL SOFT TISSUES: Recent postoperative change of left hip arthroplasty.  Hardware appears intact  without perihardware lucency.  Overlying irregular fluid collection measuring approximately 7.0 x 5.1 cm maximal axial dimension (axial image 54).  Surrounding stranding, tracking towards the cutaneous surface and inferiorly along the left quadriceps musculature.  Overall appearance is decreased in size compared to prior 08/05/2024.    Additional heterogeneous fluid collection tracking superiorly within the substance of the left iliopsoas, measuring 4.0 x 2.9 cm maximal axial dimension (axial image 28).  Hyperdense components suggestive of more recent blood products. Overall appearance is slightly increased in size compared to prior 08/05/2024, noting greater hypodense component compatible with aging blood products.    OTHER: Layering hyperattenuation in the partially visualized gallbladder, similar to prior.    Trace pelvic free fluid.   Impression:       Recent postoperative change of left hip arthroplasty.  Overlying irregular fluid collection appears decreased in size compared to prior CT 08/05/2024.    Presumed intramuscular hematoma within the left iliopsoas, appears slightly increased in size.  Hyperdense components suggestive of more recent blood products.  Hemorrhagic iliopsoas bursitis less likely.    Electronically signed by resident: Hansel Saucedo  Date: 08/13/2024  Time: 13:34    Electronically signed by: Lalo Orozco MD  Date: 08/13/2024  Time: 14:15     X-Ray Hip 2 or 3 views Left with Pelvis when performed [[id number]] Resulted: 08/12/24 1453   Order Status: Completed Updated: 08/12/24 1455   Narrative:     EXAMINATION:  XR HIP WITH PELVIS WHEN PERFORMED 2 OR 3 VIEWS LEFT    CLINICAL HISTORY:  left hip fluid collection per CT read;    TECHNIQUE:  AP view of the pelvis and frog leg lateral view of the left hip were performed.    COMPARISON:  CT hip August 4, 2024    FINDINGS:  There are surgical changes of total right hip arthroplasty.  The surrounding osseous structures are intact without fracture  or osseous destructive process.   Impression:       As above      Electronically signed by: Joceline Freire MD  Date: 08/12/2024  Time: 14:53   X-Ray Chest 1 View [[id number]] Resulted: 08/08/24 2126   Order Status: Completed Updated: 08/08/24 2128   Narrative:     EXAMINATION:  XR CHEST 1 VIEW    CLINICAL HISTORY:  concern for aspiration;    TECHNIQUE:  Single frontal view of the chest was performed.    COMPARISON:  08/04/2024.    FINDINGS:  There are continued coarse chronic interstitial opacities, similar prior.  There is no new focal consolidation or significant detrimental change from prior.  There are multiple surgical clips.  The pleural spaces are clear.  The cardiac silhouette is enlarged.  There are calcifications of the aortic arch.  Osseous structures demonstrate degenerative changes.   Impression:       No significant detrimental change from prior study.      Electronically signed by: Ernst Whipple  Date: 08/08/2024  Time: 21:26      Imaging History     2024    Date Procedure Name Study Review Link PACS Link Status Accession Number Location   08/12/24 02:09 PM X-Ray Hip 2 or 3 views Left with Pelvis when performed Study Review  Images Final [id number] [facility]   08/11/24 04:04 PM Cardiac monitoring strips Study Review  Final     08/09/24 01:21 AM Cardiac monitoring strips Study Review  Final     08/08/24 08:32 PM X-Ray Chest 1 View Study Review  Images Final [id number] [facility]   08/05/24 02:04 PM Echo Saline Bubble? No; Ultrasound enhancing contrast? No Study Review  Images Final [id number] [facility]   08/05/24 12:02 PM MRI Brain Without Contrast Study Review  Images Final [id number] [facility]   08/05/24 12:00 PM CT Abdomen Pelvis  Without Contrast Study Review  Images Final [id number] [facility]   08/04/24 06:55 PM CT Hip Without Contrast Left Study Review  Images Final [id number] [facility]   08/04/24 12:36 PM X-Ray Chest AP Portable Study Review  Images Final [id number] [facility]   08/04/24 12:30 PM CT Head Without Contrast Study Review  Images  Final [id number] [facility]

## 2024-08-14 NOTE — PROCEDURES
"  Pre Op Diagnosis: left hip collection  Post Op Diagnosis: Same    Procedure: aspiration    Procedure performed by: Tucker    Written Informed Consent Obtained: Yes  Specimen Removed: NO  Estimated Blood Loss: Minimal    Findings:   Under US guidance an 18 g needle was advanced into left hip fluid collection. Aspiration of collection was performed, however no fluid could be obtained. The needle was repositioned several times with the same results. Finding favored to reflect coagulated hematoma.      Patient tolerated procedure well.    Lalo Ceballos MD ([facility])  Interventional Radiology  [phone]      "

## 2024-08-14 NOTE — PLAN OF CARE
Provider assessed patient and found no fluid for abscess aspiration and/or labs. Patient AAOx3, no distress noted, respirations even and unlabored. Allergies reviewed. Patient transferred to bed 952 via patient transporter. Patient stable for transfer. Phone report called to ROMINA Mustafa

## 2024-08-14 NOTE — ASSESSMENT & PLAN NOTE
"84F with h/o pAFib (on xarelto), dementia, femoral neck fracture, s/p left bipolar hemiarthroplasty with hardware placed on 6/4/24 at [facility]. (Minimal records available at this time). Hosp course c/b bacteremia 06/24 1 of 2 bottles +proteus vulgaris (R-ampicillin, cefuroxime, tetracycline); and surgical wound infection. Left hip wound cx 06/27: +PSA (I-aztreonam), +Staph hemolyticus (R-tetracycline, S-vanc), +E.faecalis (panS), +proteus vulgaris (R-ampicillin, cefuroxime, Tetracycline). Pt underwent surgical debridement (no Op notes available) 07/01, surgical cxs NG.  Wound swab cxs 07/01 No growth. Also, records reveal ESBL-e.coli UTI 06/25. It seems pt has been maintained on Iv-Erta since then, despite not covering all bacteria isolated on cxs from infected surgical wound; and per family, pt was to complete prolonged course Erta ~6wks, with impending BRENDAN. -- Pt transferred to [facility] from [facility] for further work-up of unwitnessed seizure, and anemia. Unclear what provoked seizure. EEG was negative.    Ct-a/p and Ct-hip reveal stable hardware, but note non-specific soft-tissue fluid collection at lateral hip measuring 9.4 x 8.7 x 4.8cm, as well as IM hematoma of left iliopsoas, psoas, and iliacus muscle, with potential active hemorrhage.    ID was consulted as "Wound infection. Was on ertapenem at nursing home. Should this be continued" On 08/09, switched erta to merrem while awaiting additional records.    Stable.  Blood cultures finalized negative.  IR unsuccessfully attempted left hip.  Remains on meropenem.  Afebrile.   Repeat CT L hip shows fluid smaller since 8/5.  Bone scan ordered and pending.  Her speech and alertness has improved especially today.    Recommendations:  Continue meropenem  Follow up bone scan  Will need further records in order to give proper recommendation, specifically [facility] records including infectious disease and ortho surgery notes, as well as culture " reports with sensitivities.   Spoke with patient's mother who reports she was followed by infectious disease, Dr. Zuhair Nascimento in [place].    -- ID will continue to follow w/ further recs.

## 2024-08-14 NOTE — PLAN OF CARE
Patient AAOx3, no distress noted, respirations even and unlabored, will continue to monitor. VSS. Acceptance of education, consents signed, H/P done. Labs reviewed. Patient in IR Room 190 for abscess aspiration procedure. Warm blankets applied to patient. Patient prepped and draped in sterile fashion.

## 2024-08-14 NOTE — SUBJECTIVE & OBJECTIVE
Interval History:   8/14: Patient to IR to attempt aspiration but appears its just residual blood from her prior bleeding issues.     Review of Systems   Constitutional:  Negative for activity change, appetite change, chills, fever and unexpected weight change.   HENT:  Negative for congestion and sore throat.    Respiratory:  Negative for cough and shortness of breath.    Cardiovascular:  Negative for chest pain, palpitations and leg swelling.   Gastrointestinal:  Negative for abdominal distention, abdominal pain, blood in stool, constipation, diarrhea, nausea and vomiting.   Genitourinary:  Negative for difficulty urinating, dysuria and hematuria.   Musculoskeletal:  Negative for arthralgias and myalgias.   Skin:  Negative for color change and rash.   Neurological:  Negative for dizziness, tremors and seizures.   Psychiatric/Behavioral:  Positive for confusion.      Objective:     Vital Signs (Most Recent):  Temp: 97.3 °F (36.3 °C) (08/14/24 0833)  Pulse: 62 (08/14/24 1059)  Resp: 17 (08/14/24 1059)  BP: (!) 127/59 (08/14/24 1059)  SpO2: 98 % (08/14/24 1059) Vital Signs (24h Range):  Temp:  [97.3 °F (36.3 °C)-98.5 °F (36.9 °C)] 97.3 °F (36.3 °C)  Pulse:  [61-73] 62  Resp:  [14-20] 17  SpO2:  [96 %-99 %] 98 %  BP: (127-192)/(57-87) 127/59     Weight: 82.5 kg (181 lb 14.1 oz)  Body mass index is 28.49 kg/m².    Intake/Output Summary (Last 24 hours) at 8/14/2024 1356  Last data filed at 8/14/2024 1315  Gross per 24 hour   Intake --   Output 925 ml   Net -925 ml         Physical Exam  Vitals reviewed.   Constitutional:       General: She is not in acute distress.     Appearance: She is well-developed.   HENT:      Head: Normocephalic and atraumatic.      Comments: EEG in place.   Eyes:      Extraocular Movements: Extraocular movements intact.      Pupils: Pupils are equal, round, and reactive to light.   Neck:      Vascular: No JVD.      Trachea: No tracheal deviation.   Cardiovascular:      Rate and Rhythm: Normal  rate and regular rhythm.      Heart sounds: No murmur heard.     No friction rub. No gallop.   Pulmonary:      Effort: No respiratory distress.      Breath sounds: Normal breath sounds. No wheezing or rales.   Abdominal:      General: Bowel sounds are normal. There is no distension.      Palpations: Abdomen is soft. There is no mass.      Tenderness: There is no abdominal tenderness.   Musculoskeletal:         General: No deformity.      Cervical back: Neck supple.   Lymphadenopathy:      Cervical: No cervical adenopathy.   Skin:     General: Skin is warm and dry.      Findings: No rash.   Neurological:      Mental Status: She is alert. She is disoriented.      Cranial Nerves: Cranial nerves 2-12 are intact.      Deep Tendon Reflexes:      Reflex Scores:       Tricep reflexes are 2+ on the right side and 2+ on the left side.       Bicep reflexes are 2+ on the right side and 2+ on the left side.       Patellar reflexes are 2+ on the right side and 2+ on the left side.     Comments: Alert to person, place in hospital and year but not date/month/circumstances behind hospitalization, mildly confused   Psychiatric:         Speech: Speech is slurred.             Significant Labs: All pertinent labs within the past 24 hours have been reviewed.    Significant Imaging: I have reviewed all pertinent imaging results/findings within the past 24 hours.

## 2024-08-14 NOTE — PROGRESS NOTES
"[facility] ([facility])  Infectious Disease  Progress Note    Patient Name: Shaye Clemente  MRN: [id number]  Admission Date: 8/6/2024  Length of Stay: 8 days  Attending Physician: Traci Mancini MD  Primary Care Provider: Skip Singh MD    Isolation Status: Contact  Assessment/Plan:      ID  Post op infection  84F with h/o pAFib (on xarelto), dementia, femoral neck fracture, s/p left bipolar hemiarthroplasty with hardware placed on 6/4/24 at [facility]. (Minimal records available at this time). Hosp course c/b bacteremia 06/24 1 of 2 bottles +proteus vulgaris (R-ampicillin, cefuroxime, tetracycline); and surgical wound infection. Left hip wound cx 06/27: +PSA (I-aztreonam), +Staph hemolyticus (R-tetracycline, S-vanc), +E.faecalis (panS), +proteus vulgaris (R-ampicillin, cefuroxime, Tetracycline). Pt underwent surgical debridement (no Op notes available) 07/01, surgical cxs NG.  Wound swab cxs 07/01 No growth. Also, records reveal ESBL-e.coli UTI 06/25. It seems pt has been maintained on Iv-Erta since then, despite not covering all bacteria isolated on cxs from infected surgical wound; and per family, pt was to complete prolonged course Erta ~6wks, with impending BRENDAN. -- Pt transferred to [facility] from [facility] for further work-up of unwitnessed seizure, and anemia. Unclear what provoked seizure. EEG was negative.    Ct-a/p and Ct-hip reveal stable hardware, but note non-specific soft-tissue fluid collection at lateral hip measuring 9.4 x 8.7 x 4.8cm, as well as IM hematoma of left iliopsoas, psoas, and iliacus muscle, with potential active hemorrhage.    ID was consulted as "Wound infection. Was on ertapenem at nursing home. Should this be continued" On 08/09, switched erta to merrem while awaiting additional records.    Stable.  Blood cultures finalized negative.  IR unsuccessfully attempted left hip.  Remains on meropenem.  Afebrile.   Repeat CT L hip shows fluid smaller since 8/5. "  Bone scan ordered and pending.  Her speech and alertness has improved especially today.    Recommendations:  Continue meropenem  Follow up bone scan  Will need further records in order to give proper recommendation, specifically [facility] records including infectious disease and ortho surgery notes, as well as culture reports with sensitivities.   Spoke with patient's mother who reports she was followed by infectious disease, Dr. Zuhair Nascimento in [place].    -- ID will continue to follow w/ further recs.              Anticipated Disposition: tbd    Thank you for your consult. I will follow-up with patient. Please contact us if you have any additional questions.    OCTAVIA Murphy  Infectious Disease  [facility] - Neurosurgery (Hospital)    Subjective:     Principal Problem:Seizure-like activity    HPI: Ms. Clemente is a 85 yo female with PMH of left femoral neck fracture, s/p left bipolar hemiarthroplasty on 6/4/24, paroxysmal atrial fibrillation (on Xarelto), hypothyroidism, dementia, and hyperlipidemia admitted to [facility] (from [facility]) on August 4 with low hemoglobin. While at OSH, work up was nonrevealing for source of anemia, pt had reported tarry stools, though stool hemoccult was negative. Pt reported swelling around her left hip, and CT left hip noted soft tissue swelling/fluid in deep left pelvis, presacral region. Pt was also treated empirically for cysitits with cipro. During a CT of her AP, pt had a witnessed seizure that required keppra. Neurology was consulted and MRI brain and EEG without acute findings. While at OSH, ortho surgery and interventional radiology was consulted at [facility] and surgical intervention was not planned, though recommended to continue close monitoring of H/H and if continues to drop recommending CTA of abdomen/pelvis to see if IR intervention is indicated. Ultimately, pt was transferred to [facility] for EEG, neurology eval 2/2 new onset seizures, as well as  "close monitoring in H/H.     Prior to admission to [facility], pt was sent to [facility] as there was concerns for left hip pain, redness, and swelling. Per limited records available, appears pt was bacteremic with proteus vulgaris on 6/24/24, also with Ecoli ESBL uti. It appears Dr. Dave Nascimento was following pt. Per ED notes on 7/25/24 from [facility], pt was noted that "pt underwent left hip debridement for post op left hip infection on July 1st, she had proteus in her tissues, culture later was positive for pseudomonas, staph hemolyticus, and enterococcus. Pt was only on rocephin, though it was planned to continue cefepime,  however it was discontinued and pt was started on rocephin alone."     ID was consulted d/t "Wound infection. Was on ertapenem at nursing home. Should this be continued"        Interval History:   No acute events overnight  IR consulted for left hip- drainage/aspiration attempted and was unsuccessful.  Reports minimal left hip discomfort  Ortho Sx following  Denies dysuria, fevers chills or sweats    Review of Systems   Constitutional:  Negative for chills, diaphoresis, fatigue and fever.   HENT: Negative.     Respiratory:  Negative for cough and shortness of breath.    Cardiovascular:  Negative for leg swelling.   Gastrointestinal:  Negative for blood in stool, diarrhea, nausea and vomiting.   Genitourinary:  Negative for difficulty urinating, dysuria, flank pain and pelvic pain.   Skin:  Negative for rash and wound.   Psychiatric/Behavioral:  Positive for confusion (though oriented). Negative for agitation.      Objective:     Vital Signs (Most Recent):  Temp: 97.5 °F (36.4 °C) (08/14/24 1533)  Pulse: 73 (08/14/24 1533)  Resp: 18 (08/14/24 1533)  BP: 127/60 (08/14/24 1533)  SpO2: 98 % (08/14/24 1533) Vital Signs (24h Range):  Temp:  [97.3 °F (36.3 °C)-98.5 °F (36.9 °C)] 97.5 °F (36.4 °C)  Pulse:  [61-73] 73  Resp:  [14-20] 18  SpO2:  [96 %-99 %] 98 %  BP: (127-179)/(57-81) " 127/60     Weight: 82.5 kg (181 lb 14.1 oz)  Body mass index is 28.49 kg/m².    Estimated Creatinine Clearance: 39.3 mL/min (based on SCr of 1.2 mg/dL).     Physical Exam  Vitals reviewed.   Constitutional:       General: She is not in acute distress.     Appearance: Normal appearance. She is not ill-appearing.   HENT:      Head: Normocephalic.      Nose: Nose normal.      Mouth/Throat:      Mouth: Mucous membranes are moist.      Pharynx: Oropharynx is clear.   Eyes:      General:         Right eye: No discharge.         Left eye: No discharge.      Conjunctiva/sclera: Conjunctivae normal.   Cardiovascular:      Rate and Rhythm: Normal rate and regular rhythm.      Pulses: Normal pulses.      Heart sounds: Normal heart sounds. No murmur heard.  Pulmonary:      Effort: Pulmonary effort is normal. No respiratory distress.      Breath sounds: Normal breath sounds. No stridor.   Abdominal:      General: Abdomen is flat. There is no distension.      Palpations: Abdomen is soft.      Tenderness: There is no abdominal tenderness. There is no right CVA tenderness or left CVA tenderness.   Musculoskeletal:         General: Normal range of motion.      Cervical back: Normal range of motion.      Right lower leg: No edema.      Left lower leg: No edema.   Skin:     Findings: No lesion or rash.      Comments: With healed left hip incision, without surrounding swelling, redness. Without tenderness to palpation. Without drainage or underlying fluctuance.    Neurological:      Mental Status: She is alert and oriented to person, place, and time.      Comments: Mumbling speech resolved and more awake/ alert/ conversant and oriented x4    Psychiatric:         Mood and Affect: Mood normal.          Significant Labs: Blood Culture:   Recent Labs   Lab 08/04/24  1135 08/04/24  1202 08/07/24  1007   LABBLOO No growth after 5 days. No growth after 5 days. No growth after 5 days.     CBC:   Recent Labs   Lab 08/14/24  0925   WBC 8.40    HGB 10.0*   HCT 31.4*          CMP:   Recent Labs   Lab 08/14/24  0925   *   K 3.5      CO2 23   GLU 72   BUN 22   CREATININE 1.2   CALCIUM 8.8   PROT 8.3   ALBUMIN 2.2*   BILITOT 0.6   ALKPHOS 119   AST 27   ALT 11   ANIONGAP 7*       Microbiology Results (last 7 days)       Procedure Component Value Units Date/Time    Culture, Anaerobic [[id number]]     Order Status: No result Specimen: Joint Fluid from Hip, Left     AFB Culture & Smear [[id number]]     Order Status: No result Specimen: Joint Fluid from Hip, Left     Fungus culture [[id number]]     Order Status: No result Specimen: Joint Fluid from Hip, Left     Gram stain [[id number]]     Order Status: No result Specimen: Joint Fluid from Hip, Left     Aerobic culture [[id number]]     Order Status: No result Specimen: Hip     Blood culture [[id number]] Collected: 08/07/24 1007    Order Status: Completed Specimen: Blood Updated: 08/12/24 1212     Blood Culture, Routine No growth after 5 days.    Narrative:      Picc line          Urine Culture:   Recent Labs   Lab 08/04/24  1452   LABURIN ENTEROCOCCUS FAECIUM VRE  50,000 - 99,999 cfu/ml  *     Urine Studies:   Recent Labs   Lab 08/04/24  1452 08/09/24  0919   COLORU Yellow Yellow   APPEARANCEUA Clear Clear   PHUR 7.0 7.0   SPECGRAV 1.015 1.015   PROTEINUA 2+* 2+*   GLUCUA Negative Negative   KETONESU Negative Negative   BILIRUBINUA Negative Negative   OCCULTUA Trace* Trace*   NITRITE Negative Negative   UROBILINOGEN Negative  --    LEUKOCYTESUR Trace* Negative   RBCUA 4 3   WBCUA 8* 2   BACTERIA Many* Rare   SQUAMEPITHEL 4  --    HYALINECASTS 0 3*     All pertinent labs within the past 24 hours have been reviewed.    Significant Imaging: I have reviewed all pertinent imaging results/findings within the past 24 hours.  CT Pelvis With IV Contrast [[id number]] Resulted: 08/13/24 1415   Order Status: Completed Updated: 08/13/24 1418   Narrative:     EXAMINATION:  CT PELVIS WITH IV  CONTRAST    CLINICAL HISTORY:  Evaluate pelvic fluid collection;    TECHNIQUE:  Low dose axial images, sagittal and coronal reformations were obtained from the iliac crests to the pubic symphysis after the administration of 75 cc Omnipaque 350 intravenous contrast.  Oral contrast was not administered.    COMPARISON:  CT abdomen pelvis 08/05/2024.  CT hip 08/04/2024. Hip radiograph 08/12/2024.    FINDINGS:  BOWEL/MESENTERY: No evidence of bowel obstruction or inflammatory process.    REPRODUCTIVE: Hysterectomy.    URINARY BLADDER: Mildly distended.  Small foci of intraluminal air, likely related to recent instrumentation.    VASCULATURE: No abdominal aortic aneurysm. No significant atherosclerotic calcification.    BONES/EXTRAPERITONEAL SOFT TISSUES: Recent postoperative change of left hip arthroplasty.  Hardware appears intact without perihardware lucency.  Overlying irregular fluid collection measuring approximately 7.0 x 5.1 cm maximal axial dimension (axial image 54).  Surrounding stranding, tracking towards the cutaneous surface and inferiorly along the left quadriceps musculature.  Overall appearance is decreased in size compared to prior 08/05/2024.    Additional heterogeneous fluid collection tracking superiorly within the substance of the left iliopsoas, measuring 4.0 x 2.9 cm maximal axial dimension (axial image 28).  Hyperdense components suggestive of more recent blood products. Overall appearance is slightly increased in size compared to prior 08/05/2024, noting greater hypodense component compatible with aging blood products.    OTHER: Layering hyperattenuation in the partially visualized gallbladder, similar to prior.    Trace pelvic free fluid.   Impression:       Recent postoperative change of left hip arthroplasty.  Overlying irregular fluid collection appears decreased in size compared to prior CT 08/05/2024.    Presumed intramuscular hematoma within the left iliopsoas, appears slightly increased  in size.  Hyperdense components suggestive of more recent blood products.  Hemorrhagic iliopsoas bursitis less likely.    Electronically signed by resident: Hansel Saucedo  Date: 08/13/2024  Time: 13:34    Electronically signed by: Lalo Orozco MD  Date: 08/13/2024  Time: 14:15     X-Ray Hip 2 or 3 views Left with Pelvis when performed [[id number]] Resulted: 08/12/24 1453   Order Status: Completed Updated: 08/12/24 1455   Narrative:     EXAMINATION:  XR HIP WITH PELVIS WHEN PERFORMED 2 OR 3 VIEWS LEFT    CLINICAL HISTORY:  left hip fluid collection per CT read;    TECHNIQUE:  AP view of the pelvis and frog leg lateral view of the left hip were performed.    COMPARISON:  CT hip August 4, 2024    FINDINGS:  There are surgical changes of total right hip arthroplasty.  The surrounding osseous structures are intact without fracture or osseous destructive process.   Impression:       As above      Electronically signed by: Joceline Freire MD  Date: 08/12/2024  Time: 14:53   X-Ray Chest 1 View [[id number]] Resulted: 08/08/24 2126   Order Status: Completed Updated: 08/08/24 2128   Narrative:     EXAMINATION:  XR CHEST 1 VIEW    CLINICAL HISTORY:  concern for aspiration;    TECHNIQUE:  Single frontal view of the chest was performed.    COMPARISON:  08/04/2024.    FINDINGS:  There are continued coarse chronic interstitial opacities, similar prior.  There is no new focal consolidation or significant detrimental change from prior.  There are multiple surgical clips.  The pleural spaces are clear.  The cardiac silhouette is enlarged.  There are calcifications of the aortic arch.  Osseous structures demonstrate degenerative changes.   Impression:       No significant detrimental change from prior study.      Electronically signed by: Ernst Whipple  Date: 08/08/2024  Time: 21:26      Imaging History     2024    Date Procedure Name Study Review Link PACS Link Status Accession Number Location   08/12/24 02:09 PM X-Ray Hip 2 or 3  views Left with Pelvis when performed Study Review  Images Final [id number] [facility]   08/11/24 04:04 PM Cardiac monitoring strips Study Review  Final     08/09/24 01:21 AM Cardiac monitoring strips Study Review  Final     08/08/24 08:32 PM X-Ray Chest 1 View Study Review  Images Final [id number] [facility]   08/05/24 02:04 PM Echo Saline Bubble? No; Ultrasound enhancing contrast? No Study Review  Images Final [id number] [facility]   08/05/24 12:02 PM MRI Brain Without Contrast Study Review  Images Final [id number] [facility]   08/05/24 12:00 PM CT Abdomen Pelvis  Without Contrast Study Review  Images Final [id number] [facility]   08/04/24 06:55 PM CT Hip Without Contrast Left Study Review  Images Final [id number] [facility]   08/04/24 12:36 PM X-Ray Chest AP Portable Study Review  Images Final [id number] [facility]   08/04/24 12:30 PM CT Head Without Contrast Study Review  Images Final [id number] [facility]

## 2024-08-15 LAB
ALBUMIN SERPL BCP-MCNC: 2.1 G/DL (ref 3.5–5.2)
ALP SERPL-CCNC: 126 U/L (ref 55–135)
ALT SERPL W/O P-5'-P-CCNC: 10 U/L (ref 10–44)
ANION GAP SERPL CALC-SCNC: 5 MMOL/L (ref 8–16)
AST SERPL-CCNC: 26 U/L (ref 10–40)
BASOPHILS # BLD AUTO: 0.06 K/UL (ref 0–0.2)
BASOPHILS NFR BLD: 0.7 % (ref 0–1.9)
BILIRUB SERPL-MCNC: 0.5 MG/DL (ref 0.1–1)
BUN SERPL-MCNC: 25 MG/DL (ref 8–23)
CALCIUM SERPL-MCNC: 8.7 MG/DL (ref 8.7–10.5)
CHLORIDE SERPL-SCNC: 104 MMOL/L (ref 95–110)
CO2 SERPL-SCNC: 26 MMOL/L (ref 23–29)
CREAT SERPL-MCNC: 1.5 MG/DL (ref 0.5–1.4)
DIFFERENTIAL METHOD BLD: ABNORMAL
EOSINOPHIL # BLD AUTO: 1.1 K/UL (ref 0–0.5)
EOSINOPHIL NFR BLD: 13.8 % (ref 0–8)
ERYTHROCYTE [DISTWIDTH] IN BLOOD BY AUTOMATED COUNT: 20.4 % (ref 11.5–14.5)
EST. GFR  (NO RACE VARIABLE): 34.4 ML/MIN/1.73 M^2
GLUCOSE SERPL-MCNC: 86 MG/DL (ref 70–110)
HCT VFR BLD AUTO: 27.3 % (ref 37–48.5)
HGB BLD-MCNC: 8.9 G/DL (ref 12–16)
IMM GRANULOCYTES # BLD AUTO: 0.13 K/UL (ref 0–0.04)
IMM GRANULOCYTES NFR BLD AUTO: 1.6 % (ref 0–0.5)
LYMPHOCYTES # BLD AUTO: 1.7 K/UL (ref 1–4.8)
LYMPHOCYTES NFR BLD: 20.2 % (ref 18–48)
MCH RBC QN AUTO: 31.7 PG (ref 27–31)
MCHC RBC AUTO-ENTMCNC: 32.6 G/DL (ref 32–36)
MCV RBC AUTO: 97 FL (ref 82–98)
MONOCYTES # BLD AUTO: 1.1 K/UL (ref 0.3–1)
MONOCYTES NFR BLD: 13.6 % (ref 4–15)
NEUTROPHILS # BLD AUTO: 4.1 K/UL (ref 1.8–7.7)
NEUTROPHILS NFR BLD: 50.1 % (ref 38–73)
NRBC BLD-RTO: 0 /100 WBC
PLATELET # BLD AUTO: 207 K/UL (ref 150–450)
PMV BLD AUTO: 10.3 FL (ref 9.2–12.9)
POCT GLUCOSE: 104 MG/DL (ref 70–110)
POCT GLUCOSE: 95 MG/DL (ref 70–110)
POTASSIUM SERPL-SCNC: 3.4 MMOL/L (ref 3.5–5.1)
PROT SERPL-MCNC: 7.8 G/DL (ref 6–8.4)
RBC # BLD AUTO: 2.81 M/UL (ref 4–5.4)
SODIUM SERPL-SCNC: 135 MMOL/L (ref 136–145)
WBC # BLD AUTO: 8.16 K/UL (ref 3.9–12.7)

## 2024-08-15 PROCEDURE — 80053 COMPREHEN METABOLIC PANEL: CPT | Performed by: HOSPITALIST

## 2024-08-15 PROCEDURE — 85025 COMPLETE CBC W/AUTO DIFF WBC: CPT | Performed by: HOSPITALIST

## 2024-08-15 PROCEDURE — 97530 THERAPEUTIC ACTIVITIES: CPT

## 2024-08-15 PROCEDURE — 25000003 PHARM REV CODE 250: Performed by: STUDENT IN AN ORGANIZED HEALTH CARE EDUCATION/TRAINING PROGRAM

## 2024-08-15 PROCEDURE — 25000003 PHARM REV CODE 250: Performed by: HOSPITALIST

## 2024-08-15 PROCEDURE — 99232 SBSQ HOSP IP/OBS MODERATE 35: CPT | Mod: ,,, | Performed by: PHYSICIAN ASSISTANT

## 2024-08-15 PROCEDURE — 36415 COLL VENOUS BLD VENIPUNCTURE: CPT | Performed by: HOSPITALIST

## 2024-08-15 PROCEDURE — 63600175 PHARM REV CODE 636 W HCPCS: Performed by: STUDENT IN AN ORGANIZED HEALTH CARE EDUCATION/TRAINING PROGRAM

## 2024-08-15 PROCEDURE — 11000001 HC ACUTE MED/SURG PRIVATE ROOM

## 2024-08-15 PROCEDURE — A9503 TC99M MEDRONATE: HCPCS | Performed by: HOSPITALIST

## 2024-08-15 PROCEDURE — 97535 SELF CARE MNGMENT TRAINING: CPT

## 2024-08-15 PROCEDURE — 27000207 HC ISOLATION

## 2024-08-15 RX ORDER — TC 99M MEDRONATE 20 MG/10ML
21.16 INJECTION, POWDER, LYOPHILIZED, FOR SOLUTION INTRAVENOUS
Status: COMPLETED | OUTPATIENT
Start: 2024-08-15 | End: 2024-08-15

## 2024-08-15 RX ORDER — POTASSIUM CHLORIDE 20 MEQ/1
40 TABLET, EXTENDED RELEASE ORAL ONCE
Status: COMPLETED | OUTPATIENT
Start: 2024-08-15 | End: 2024-08-15

## 2024-08-15 RX ADMIN — MEROPENEM 2 G: 2 INJECTION, POWDER, FOR SOLUTION INTRAVENOUS at 01:08

## 2024-08-15 RX ADMIN — TECHNETIUM TC 99M MEDRONATE 21.16 MILLICURIE: 25 INJECTION, POWDER, FOR SOLUTION INTRAVENOUS at 11:08

## 2024-08-15 RX ADMIN — CARVEDILOL 12.5 MG: 12.5 TABLET, FILM COATED ORAL at 09:08

## 2024-08-15 RX ADMIN — FERROUS SULFATE TAB EC 325 MG (65 MG FE EQUIVALENT) 1 EACH: 325 (65 FE) TABLET DELAYED RESPONSE at 08:08

## 2024-08-15 RX ADMIN — POLYETHYLENE GLYCOL 3350 17 G: 17 POWDER, FOR SOLUTION ORAL at 08:08

## 2024-08-15 RX ADMIN — POTASSIUM CHLORIDE 40 MEQ: 1500 TABLET, EXTENDED RELEASE ORAL at 04:08

## 2024-08-15 RX ADMIN — LEVETIRACETAM 500 MG: 500 TABLET, FILM COATED ORAL at 08:08

## 2024-08-15 RX ADMIN — MEMANTINE 5 MG: 5 TABLET ORAL at 09:08

## 2024-08-15 RX ADMIN — MEMANTINE 5 MG: 5 TABLET ORAL at 08:08

## 2024-08-15 RX ADMIN — LEVETIRACETAM 500 MG: 500 TABLET, FILM COATED ORAL at 09:08

## 2024-08-15 RX ADMIN — ATORVASTATIN CALCIUM 40 MG: 40 TABLET, FILM COATED ORAL at 09:08

## 2024-08-15 RX ADMIN — MONTELUKAST 10 MG: 10 TABLET, FILM COATED ORAL at 09:08

## 2024-08-15 RX ADMIN — HYDROCODONE BITARTRATE AND ACETAMINOPHEN 1 TABLET: 5; 325 TABLET ORAL at 04:08

## 2024-08-15 RX ADMIN — DOCUSATE SODIUM 100 MG: 100 CAPSULE, LIQUID FILLED ORAL at 09:08

## 2024-08-15 RX ADMIN — DOCUSATE SODIUM 100 MG: 100 CAPSULE, LIQUID FILLED ORAL at 08:08

## 2024-08-15 RX ADMIN — LEVOTHYROXINE SODIUM 25 MCG: 25 TABLET ORAL at 06:08

## 2024-08-15 RX ADMIN — CARVEDILOL 12.5 MG: 12.5 TABLET, FILM COATED ORAL at 08:08

## 2024-08-15 NOTE — ASSESSMENT & PLAN NOTE
-Pt. Presented anemia, required 1 unit pRBC, Hgb now stable ~10. CT abdomen conerning for subcutaneous fluid collection overlying the left hip may represent resolving hematoma or postsurgical seroma andFindings suspicious for an intramuscular hematoma of the left iliopsoas, psoas and iliacus muscles with potential active hemorrhage.  -conservative management per Orthopedics Interventional Radiology at this time   -stable hemoglobin   -no successful aspiration from left hip via IR procedure on 08/14  -bone scan shows postoperative changes of left hip arthroplasty pitted diffuse increase in the soft tissue and delayed uptake around the arthroplasty due to postoperative changes and inflammatory process.   -on merrem per ID

## 2024-08-15 NOTE — ASSESSMENT & PLAN NOTE
"84F with h/o pAFib (on xarelto), dementia, femoral neck fracture, s/p left bipolar hemiarthroplasty with hardware placed on 6/4/24 at [facility]. (Minimal records available at this time). Hosp course c/b bacteremia 06/24 1 of 2 bottles +proteus vulgaris (R-ampicillin, cefuroxime, tetracycline); and surgical wound infection. Left hip wound cx 06/27: +PSA (I-aztreonam), +Staph hemolyticus (R-tetracycline, S-vanc), +E.faecalis (panS), +proteus vulgaris (R-ampicillin, cefuroxime, Tetracycline). Pt underwent surgical debridement (no Op notes available) 07/01, surgical cxs NG.  Wound swab cxs 07/01 No growth. Also, records reveal ESBL-e.coli UTI 06/25. It seems pt has been maintained on Iv-Erta since then, despite not covering all bacteria isolated on cxs from infected surgical wound; and per family, pt was to complete prolonged course Erta ~6wks, with impending BRENDAN. -- Pt transferred to [facility] from [facility] for further work-up of unwitnessed seizure, and anemia. Unclear what provoked seizure. EEG was negative.    Ct-a/p and Ct-hip reveal stable hardware, but note non-specific soft-tissue fluid collection at lateral hip measuring 9.4 x 8.7 x 4.8cm, as well as IM hematoma of left iliopsoas, psoas, and iliacus muscle, with potential active hemorrhage.    ID was consulted as "Wound infection. Was on ertapenem at nursing home. Should this be continued" On 08/09, switched erta to merrem while awaiting additional records.    Stable.  Blood cultures finalized negative.  IR unsuccessfully attempted left hip.  Remains on meropenem.  Afebrile.   Repeat CT L hip shows fluid smaller since 8/5.  Bone scan:here are scintigraphic findings to suggest postoperative changes of left hip arthroplasty pitted diffuse increase in the soft tissue and delayed uptake around the arthroplasty due to postoperative changes and inflammatory process.  If infection is strongly suspected, a follow-up indium labeled WBC study is recommended. "  Her speech and alertness has improved. CRP improved from 287 to 110.  She denies any systemic signs of infection.    Recommendations:  Continue meropenem  Suspect may need re-treatment for left hip infection  Will need further records in order to give proper recommendation, specifically [facility] records including infectious disease and ortho surgery notes, as well as culture reports with sensitivities.   Spoke with patient's mother who reports she was followed by infectious disease, Dr. Zuhair Nascimento in [place].    -- ID will continue to follow w/ further recs.

## 2024-08-15 NOTE — ASSESSMENT & PLAN NOTE
-Pt. Presented w/ intermittent confusion, seizure activity. M-do not suspect true UTI as cause given very low colony count  -MRI brain negative for acute findings.   -TSH markedly elevated, see below for further treatment, could have been easily contributing factor  -markedly improved now

## 2024-08-15 NOTE — NURSING
Nurses Note -- 4 Eyes      8/15/2024   2:41 AM      Skin assessed during: Q Shift Change      [x] No Altered Skin Integrity Present    []Prevention Measures Documented      [] Yes- Altered Skin Integrity Present or Discovered   [] LDA Added if Not in Epic (Describe Wound)   [] New Altered Skin Integrity was Present on Admit and Documented in LDA   [] Wound Image Taken    Wound Care Consulted? No    Attending Nurse:  April     Second RN/Staff Member:  ROMINA Batista

## 2024-08-15 NOTE — SUBJECTIVE & OBJECTIVE
Interval History:   No acute events overnight  IR consulted for left hip- drainage/aspiration attempted and was unsuccessful 8/14.  Reports not feeling well today  Left hip bone scan done.  Denies dysuria, fevers chills or sweats    Review of Systems   Constitutional:  Negative for chills, diaphoresis, fatigue and fever.   HENT: Negative.     Respiratory:  Negative for cough and shortness of breath.    Cardiovascular:  Negative for leg swelling.   Gastrointestinal:  Negative for blood in stool, diarrhea, nausea and vomiting.   Genitourinary:  Negative for difficulty urinating, dysuria, flank pain and pelvic pain.   Skin:  Negative for rash and wound.   Psychiatric/Behavioral:  Positive for confusion (though oriented). Negative for agitation.      Objective:     Vital Signs (Most Recent):  Temp: 97.7 °F (36.5 °C) (08/15/24 1618)  Pulse: 68 (08/15/24 1618)  Resp: 18 (08/15/24 1628)  BP: (!) 149/68 (08/15/24 1618)  SpO2: (!) 94 % (08/15/24 1618) Vital Signs (24h Range):  Temp:  [97.7 °F (36.5 °C)-98.3 °F (36.8 °C)] 97.7 °F (36.5 °C)  Pulse:  [68-75] 68  Resp:  [18-20] 18  SpO2:  [94 %-99 %] 94 %  BP: (138-183)/(64-73) 149/68     Weight: 82.5 kg (181 lb 14.1 oz)  Body mass index is 28.49 kg/m².    Estimated Creatinine Clearance: 31.4 mL/min (A) (based on SCr of 1.5 mg/dL (H)).     Physical Exam  Vitals reviewed.   Constitutional:       General: She is not in acute distress.     Appearance: Normal appearance. She is ill-appearing. She is not toxic-appearing or diaphoretic.   HENT:      Head: Normocephalic.      Nose: Nose normal.      Mouth/Throat:      Mouth: Mucous membranes are moist.      Pharynx: Oropharynx is clear.   Eyes:      General:         Right eye: No discharge.         Left eye: No discharge.      Conjunctiva/sclera: Conjunctivae normal.   Cardiovascular:      Rate and Rhythm: Normal rate and regular rhythm.      Pulses: Normal pulses.      Heart sounds: Normal heart sounds. No murmur heard.  Pulmonary:       Effort: Pulmonary effort is normal. No respiratory distress.      Breath sounds: Normal breath sounds. No stridor.   Abdominal:      General: Abdomen is flat. There is no distension.      Palpations: Abdomen is soft.      Tenderness: There is no abdominal tenderness. There is no right CVA tenderness or left CVA tenderness.   Musculoskeletal:         General: Normal range of motion.      Cervical back: Normal range of motion.      Right lower leg: No edema.      Left lower leg: No edema.   Skin:     Findings: No lesion or rash.      Comments: With healed left hip incision, without surrounding swelling, redness. Without tenderness to palpation. Without drainage or underlying fluctuance.    Neurological:      Mental Status: She is alert and oriented to person, place, and time.      Comments: Mumbling speech resolved and more awake/ alert/ conversant and oriented x4    Psychiatric:         Mood and Affect: Mood normal.          Significant Labs: Blood Culture:   Recent Labs   Lab 08/04/24  1135 08/04/24  1202 08/07/24  1007   LABBLOO No growth after 5 days. No growth after 5 days. No growth after 5 days.     CBC:   Recent Labs   Lab 08/14/24  0925 08/15/24  0507   WBC 8.40 8.16   HGB 10.0* 8.9*   HCT 31.4* 27.3*    207       CMP:   Recent Labs   Lab 08/14/24  0925 08/15/24  0507   * 135*   K 3.5 3.4*    104   CO2 23 26   GLU 72 86   BUN 22 25*   CREATININE 1.2 1.5*   CALCIUM 8.8 8.7   PROT 8.3 7.8   ALBUMIN 2.2* 2.1*   BILITOT 0.6 0.5   ALKPHOS 119 126   AST 27 26   ALT 11 10   ANIONGAP 7* 5*       Microbiology Results (last 7 days)       Procedure Component Value Units Date/Time    Culture, Anaerobic [[id number]]     Order Status: No result Specimen: Joint Fluid from Hip, Left     AFB Culture & Smear [[id number]]     Order Status: No result Specimen: Joint Fluid from Hip, Left     Fungus culture [[id number]]     Order Status: No result Specimen: Joint Fluid from Hip, Left     Gram stain  [[id number]]     Order Status: No result Specimen: Joint Fluid from Hip, Left     Aerobic culture [[id number]]     Order Status: No result Specimen: Hip     Blood culture [[id number]] Collected: 08/07/24 1007    Order Status: Completed Specimen: Blood Updated: 08/12/24 1212     Blood Culture, Routine No growth after 5 days.    Narrative:      Picc line          Urine Culture:   Recent Labs   Lab 08/04/24  1452   LABURIN ENTEROCOCCUS FAECIUM VRE  50,000 - 99,999 cfu/ml  *     Urine Studies:   Recent Labs   Lab 08/04/24  1452 08/09/24  0919   COLORU Yellow Yellow   APPEARANCEUA Clear Clear   PHUR 7.0 7.0   SPECGRAV 1.015 1.015   PROTEINUA 2+* 2+*   GLUCUA Negative Negative   KETONESU Negative Negative   BILIRUBINUA Negative Negative   OCCULTUA Trace* Trace*   NITRITE Negative Negative   UROBILINOGEN Negative  --    LEUKOCYTESUR Trace* Negative   RBCUA 4 3   WBCUA 8* 2   BACTERIA Many* Rare   SQUAMEPITHEL 4  --    HYALINECASTS 0 3*     All pertinent labs within the past 24 hours have been reviewed.    Significant Imaging: I have reviewed all pertinent imaging results/findings within the past 24 hours.  NM Bone Scan 3 Phase Hip [[id number]] Resulted: 08/15/24 1459   Order Status: Completed Updated: 08/15/24 1501   Narrative:     EXAMINATION:  Three Phase Bone scan >    CLINICAL HISTORY:  Osteomyelitis suspected, hip, xray done;  status post left hip arthroplasty fluid collection and presumed intramuscular hematoma within the left iliopsoas    TECHNIQUE:  Following the IV administration of 21.6 mCi of Tc-99m-MDP, immediate dynamic and early static images of the hip joints were acquired followed by delayed anterior and posterior images of the same region.    COMPARISON:  CT Pelvis 08/13/24    FINDINGS:  On the flow and blood pool images there is flow symmetrical and asymmetric uptake of the tracer in soft tissue around the left hip arthroplasty.    On the delayed views, there is physiologic distribution of the tracer in  kidneys and bladder with regions of increased uptake around the left hip arthroplasty at the acetabulum and trochanteric area and inflammatory process.   Impression:       There are scintigraphic findings to suggest postoperative changes of left hip arthroplasty pitted diffuse increase in the soft tissue and delayed uptake around the arthroplasty due to postoperative changes and inflammatory process.  If infection is strongly suspected, a follow-up indium labeled WBC study is recommended      Electronically signed by: Johana Noriega  Date: 08/15/2024  Time: 14:59     CT Pelvis With IV Contrast [[id number]] Resulted: 08/13/24 1415   Order Status: Completed Updated: 08/13/24 1418   Narrative:     EXAMINATION:  CT PELVIS WITH IV CONTRAST    CLINICAL HISTORY:  Evaluate pelvic fluid collection;    TECHNIQUE:  Low dose axial images, sagittal and coronal reformations were obtained from the iliac crests to the pubic symphysis after the administration of 75 cc Omnipaque 350 intravenous contrast.  Oral contrast was not administered.    COMPARISON:  CT abdomen pelvis 08/05/2024.  CT hip 08/04/2024. Hip radiograph 08/12/2024.    FINDINGS:  BOWEL/MESENTERY: No evidence of bowel obstruction or inflammatory process.    REPRODUCTIVE: Hysterectomy.    URINARY BLADDER: Mildly distended.  Small foci of intraluminal air, likely related to recent instrumentation.    VASCULATURE: No abdominal aortic aneurysm. No significant atherosclerotic calcification.    BONES/EXTRAPERITONEAL SOFT TISSUES: Recent postoperative change of left hip arthroplasty.  Hardware appears intact without perihardware lucency.  Overlying irregular fluid collection measuring approximately 7.0 x 5.1 cm maximal axial dimension (axial image 54).  Surrounding stranding, tracking towards the cutaneous surface and inferiorly along the left quadriceps musculature.  Overall appearance is decreased in size compared to prior 08/05/2024.    Additional heterogeneous fluid  collection tracking superiorly within the substance of the left iliopsoas, measuring 4.0 x 2.9 cm maximal axial dimension (axial image 28).  Hyperdense components suggestive of more recent blood products. Overall appearance is slightly increased in size compared to prior 08/05/2024, noting greater hypodense component compatible with aging blood products.    OTHER: Layering hyperattenuation in the partially visualized gallbladder, similar to prior.    Trace pelvic free fluid.   Impression:       Recent postoperative change of left hip arthroplasty.  Overlying irregular fluid collection appears decreased in size compared to prior CT 08/05/2024.    Presumed intramuscular hematoma within the left iliopsoas, appears slightly increased in size.  Hyperdense components suggestive of more recent blood products.  Hemorrhagic iliopsoas bursitis less likely.    Electronically signed by resident: Hansel Saucedo  Date: 08/13/2024  Time: 13:34    Electronically signed by: Lalo Orozco MD  Date: 08/13/2024  Time: 14:15     X-Ray Hip 2 or 3 views Left with Pelvis when performed [[id number]] Resulted: 08/12/24 1453   Order Status: Completed Updated: 08/12/24 1455   Narrative:     EXAMINATION:  XR HIP WITH PELVIS WHEN PERFORMED 2 OR 3 VIEWS LEFT    CLINICAL HISTORY:  left hip fluid collection per CT read;    TECHNIQUE:  AP view of the pelvis and frog leg lateral view of the left hip were performed.    COMPARISON:  CT hip August 4, 2024    FINDINGS:  There are surgical changes of total right hip arthroplasty.  The surrounding osseous structures are intact without fracture or osseous destructive process.   Impression:       As above      Electronically signed by: Joceline Freire MD  Date: 08/12/2024  Time: 14:53   X-Ray Chest 1 View [[id number]] Resulted: 08/08/24 2126   Order Status: Completed Updated: 08/08/24 2128   Narrative:     EXAMINATION:  XR CHEST 1 VIEW    CLINICAL HISTORY:  concern for aspiration;    TECHNIQUE:  Single  frontal view of the chest was performed.    COMPARISON:  08/04/2024.    FINDINGS:  There are continued coarse chronic interstitial opacities, similar prior.  There is no new focal consolidation or significant detrimental change from prior.  There are multiple surgical clips.  The pleural spaces are clear.  The cardiac silhouette is enlarged.  There are calcifications of the aortic arch.  Osseous structures demonstrate degenerative changes.   Impression:       No significant detrimental change from prior study.      Electronically signed by: Ernst Whipple  Date: 08/08/2024  Time: 21:26      Imaging History     2024    Date Procedure Name Study Review Link PACS Link Status Accession Number Location   08/12/24 02:09 PM X-Ray Hip 2 or 3 views Left with Pelvis when performed Study Review  Images Final [id number] [facility]   08/11/24 04:04 PM Cardiac monitoring strips Study Review  Final     08/09/24 01:21 AM Cardiac monitoring strips Study Review  Final     08/08/24 08:32 PM X-Ray Chest 1 View Study Review  Images Final [id number] [facility]   08/05/24 02:04 PM Echo Saline Bubble? No; Ultrasound enhancing contrast? No Study Review  Images Final [id number] [facility]   08/05/24 12:02 PM MRI Brain Without Contrast Study Review  Images Final [id number] [facility]   08/05/24 12:00 PM CT Abdomen Pelvis  Without Contrast Study Review  Images Final [id number] [facility]   08/04/24 06:55 PM CT Hip Without Contrast Left Study Review  Images Final [id number] [facility]   08/04/24 12:36 PM X-Ray Chest AP Portable Study Review  Images Final [id number] [facility]   08/04/24 12:30 PM CT Head Without Contrast Study Review  Images Final [id number] [facility]

## 2024-08-15 NOTE — PT/OT/SLP PROGRESS
Physical Therapy      Patient Name:  Shaye Clemente   MRN:  [id number]    Patient not seen today secondary to KATIA for bone scan. Will follow-up as scheduled.

## 2024-08-15 NOTE — SUBJECTIVE & OBJECTIVE
Interval History:  pt denies any pain.  Afebrile. Bone scan done showed postoperative changes of left hip arthroplasty pitted diffuse increase in the soft tissue and delayed uptake around the arthroplasty due to postoperative changes and inflammatory process.   Review of Systems  Objective:     Vital Signs (Most Recent):  Temp: 98.1 °F (36.7 °C) (08/15/24 0743)  Pulse: 68 (08/15/24 0743)  Resp: 18 (08/15/24 0743)  BP: (!) 148/68 (08/15/24 0930)  SpO2: 97 % (08/15/24 0743) Vital Signs (24h Range):  Temp:  [97.8 °F (36.6 °C)-98.3 °F (36.8 °C)] 98.1 °F (36.7 °C)  Pulse:  [68-75] 68  Resp:  [18-20] 18  SpO2:  [97 %-99 %] 97 %  BP: (138-183)/(64-73) 148/68     Weight: 82.5 kg (181 lb 14.1 oz)  Body mass index is 28.49 kg/m².    Intake/Output Summary (Last 24 hours) at 8/15/2024 1606  Last data filed at 8/15/2024 0708  Gross per 24 hour   Intake --   Output 1200 ml   Net -1200 ml         Physical Exam        Clear lungs bilaterally, unlabored breathing, on room air, no cyanosis   Heart sounds indicate a regular rate and rhythm   Awake alert, no acute distress   No facial droop, no slurred speech   Heart sounds indicate a regular rate and rhythm   5/5 proximal upper and lower extremity strength bilaterally including fist  and hip flexor strength   No obvious upper nor lower extremity edema   Oriented x3   Being assisted by therapist, currently sitting up

## 2024-08-15 NOTE — PLAN OF CARE
Problem: Adult Inpatient Plan of Care  Goal: Plan of Care Review  Outcome: Progressing     Problem: Adult Inpatient Plan of Care  Goal: Patient-Specific Goal (Individualized)  Outcome: Progressing     Problem: Adult Inpatient Plan of Care  Goal: Absence of Hospital-Acquired Illness or Injury  Outcome: Progressing   Review POC; agrees with plan; continued IVAB as ordered for VRE.  To IR today and unable to aspirate area.  Working with therapy for strengthening.  Pain controlled with oral prn medication.

## 2024-08-15 NOTE — PLAN OF CARE
Patient not medically cleared.  Undergoing bone scan today and pending ID recs.  SW will continue to follow.      Gladys Peterson LMSW  [facility]  [phone]

## 2024-08-15 NOTE — PROGRESS NOTES
Yossi Vargas - Neurosurgery ([facility])  [facility] Medicine  Progress Note    Patient Name: Shaye Clemente  MRN: [id number]  Patient Class: IP- Inpatient   Admission Date: 8/6/2024  Length of Stay: 9 days  Attending Physician: Jame Tiwari MD  Primary Care Provider: Skip Singh MD        Subjective:     Principal Problem:Seizure-like activity        HPI:  84-year-old female with a left hip hemiarthroplasty in June (by report had hemiarthroplasty of closed comminuted femoral neck hip fracture), paroxysmal atrial fibrillation (on Xarelto), hypothyroidism, dementia, and hyperlipidemia admitted to [facility] on August 4 with low hemoglobin.  Patient reported tarry stools for several days, but Hemoccult was negative in the emergency department.  She also noted discomfort and swelling around her left hip.  Labs in the emergency department were concerning for anemia with hemoglobin 6.5.  There was also concern for possible cystitis, and she was placed on ciprofloxacin.  CT of the head had no acute abnormalities, and CT of the left hip showed soft tissue fullness and fluid in the deep left pelvis and presacral region.  Initial EKG showed sinus rhythm.  She was transfused 2 units packed red blood cells and admitted to Hospital Medicine.  With transfusion, hemoglobin increased to 9.3.  She was confused during her stay.  She was taken for CT of the abdomen and pelvis on the morning of August 5 when she began to have seizure activity.  She had fluttering of her eyes and movement of her head along with dorsiflexion of both feet.  She was given Keppra and placed on twice daily Keppra.  MRI brain had no acute intracranial abnormality.     She underwent Neurology tele-consultation, and recommendations included additional Keppra along with Q 4 hour neuro checks and routine EEG.  She was lethargic and required repeated stimulation for her to participate during examination.  She was oriented to person and time.  She was  able to follow simple commands.  Mentation appears to be slowly improving.      She underwent noncontrasted CT of the abdomen and pelvis that showed a subcutaneous fluid collection overlying the left hip which may represent resolving hematoma or postsurgical seroma.  There were also findings suspicious for intramuscular hematoma in the left iliopsoas, psoas, and iliacus.  Transfer center spoke with Orthopedic Surgery at [facility].  At present, there does not appear to be surgical intervention indicated in the left hip.  Case also discussed with Interventional Radiology at [facility].  Monitor the trend in hemoglobin and hemodynamic status.  If she remains stable, continue monitoring.  If she has a significant drop in hemoglobin or show signs of hemodynamic instability, she may need CTA of the abdomen and pelvis (bleed protocol) to determine if IR intervention is indicated. Pt. Transferred toHospital Medicine at [facility] in step-down status for EEG and Neurology evaluation of new onset seizures as well as monitoring the trend in her hemoglobin.     August 5:  Sodium 135, potassium 3.2, chloride 104, CO2 23, BUN 12, creatinine 1.7, glucose 84, calcium 8.4, magnesium 1.4, phosphorus 3.2, AST 20, ALT, white blood cells 7.13, hemoglobin 9.3, hematocrit 27.9, platelets 242  -repeat CBC on the afternoon of August 5 had hemoglobin 10.7 and hematocrit 32.7 (improved from earlier).  -CT abdomen and pelvis without contrast noted small hypoattenuating nodule in the right hepatic lobe may represent a cyst.  Diverticulosis.  Recent right total left hip arthroplasty with poorly defined subcutaneous fluid collection overlying the left hip which may represent resolving hematoma or postsurgical seroma.  Postsurgical abscess not excludable.  Findings suspicious for intramuscular hematoma of the left iliopsoas, psoas, and iliacus muscles with potential active hemorrhage.  -MRI brain was motion limited.  No acute  intracranial abnormality.  Cortical atrophy with periventricular deep white matter change consistent with chronic small-vessel ischemic disease.     August 4: Blood cultures with no growth to date, INR 1.8, iron saturation 19%, stool for occult blood negative, lactic acid 0.7, hemoglobin 6.7, hematocrit 20.5, procalcitonin 0.26  -urinalysis with 2+ protein, trace blood, trace leukocytes, 4 RBC, 8 WBC, many bacteria  -chest x-ray had no acute cardiopulmonary process identified  -CT head had no hemorrhage or other acute intracranial CT findings.  -CT left hip showed nonspecific soft tissue fullness to lateral hip measuring 9.4 x 8.7 x 4.8 cm.  Partially imaged slightly hyperattenuating fluid in the left deep pelvis and presacral region which may represent blood products.  Postsurgical changes of left hip arthroplasty.  -EKG showed normal sinus rhythm and appeared to be fairly normal.    Overview/Hospital Course:  Patient maintained stable hemoglobin since admission to [facility].  Working well with speech therapy, tolerating bite size soft diet.  TSH noted to be markedly elevated up to 19, started on Synthroid.  IR unable to aspirate any fluid from left hip.    Interval History:  pt denies any pain.  Afebrile. Bone scan done showed postoperative changes of left hip arthroplasty pitted diffuse increase in the soft tissue and delayed uptake around the arthroplasty due to postoperative changes and inflammatory process.   Review of Systems  Objective:     Vital Signs (Most Recent):  Temp: 98.1 °F (36.7 °C) (08/15/24 0743)  Pulse: 68 (08/15/24 0743)  Resp: 18 (08/15/24 0743)  BP: (!) 148/68 (08/15/24 0930)  SpO2: 97 % (08/15/24 0743) Vital Signs (24h Range):  Temp:  [97.8 °F (36.6 °C)-98.3 °F (36.8 °C)] 98.1 °F (36.7 °C)  Pulse:  [68-75] 68  Resp:  [18-20] 18  SpO2:  [97 %-99 %] 97 %  BP: (138-183)/(64-73) 148/68     Weight: 82.5 kg (181 lb 14.1 oz)  Body mass index is 28.49 kg/m².    Intake/Output Summary (Last 24  hours) at 8/15/2024 1606  Last data filed at 8/15/2024 0708  Gross per 24 hour   Intake --   Output 1200 ml   Net -1200 ml         Physical Exam        Clear lungs bilaterally, unlabored breathing, on room air, no cyanosis   Heart sounds indicate a regular rate and rhythm   Awake alert, no acute distress   No facial droop, no slurred speech   Heart sounds indicate a regular rate and rhythm   5/5 proximal upper and lower extremity strength bilaterally including fist  and hip flexor strength   No obvious upper nor lower extremity edema   Oriented x3   Being assisted by therapist, currently sitting up        Assessment/Plan:      * Seizure-like activity  -Reported seizure at [facility] prior to transfer pt. Was post-ictal and remains confused  -MRI brain uremarkable  -EEG so far unremarkable for any seizure activity   -currently on Keppra 500, follow up Neurology recommendations   -seizure precautions, fall precautions   -neuro checks      Encephalopathy, metabolic  -Pt. Presented w/ intermittent confusion, seizure activity. M-do not suspect true UTI as cause given very low colony count  -MRI brain negative for acute findings.   -TSH markedly elevated, see below for further treatment, could have been easily contributing factor  -markedly improved now    Hip hematoma, left  -Pt. Presented anemia, required 1 unit pRBC, Hgb now stable ~10. CT abdomen conerning for subcutaneous fluid collection overlying the left hip may represent resolving hematoma or postsurgical seroma andFindings suspicious for an intramuscular hematoma of the left iliopsoas, psoas and iliacus muscles with potential active hemorrhage.  -conservative management per Orthopedics Interventional Radiology at this time   -stable hemoglobin   -no successful aspiration from left hip via IR procedure on 08/14  -bone scan shows postoperative changes of left hip arthroplasty pitted diffuse increase in the soft tissue and delayed uptake around the  arthroplasty due to postoperative changes and inflammatory process.   -on merrem per ID          Hypertension  Pressures noted to be in the 180s systolic   Continue home Toprol   Follow up with pharmacy and family regarding home medication list    Paroxysmal atrial fibrillation  -Hx of pAfib. EKG on admit showed NSR, rhythm currently regular  -Continue home metoprolol and monitor on telemetry.  Holding Xarelto due to bleeding concerns-this was affirmed by the daughter via phone    Delirium  Intermittent.       Antibiotic long-term use  Currently on Meropenem per ID recs.       Chronic anticoagulation  On xarelto at home      Dementia  Neuro checks   Fall precautions   Seizure precautions  Memantine       Hypothyroidism  Elevated TSH on presentation near 19  Started on Synthroid 88 mcg       LUIS CARLOS (acute kidney injury)  -unclear if baseline, creatinine initially elevated at 2.0 now improving down to 1.4,   -stopping IV fluids      VTE Risk Mitigation (From admission, onward)           Ordered     Place sequential compression device  Until discontinued         08/06/24 1811                    Discharge Planning   KATI: 8/19/2024     Code Status: DNR   Is the patient medically ready for discharge?:     Reason for patient still in hospital (select all that apply): Patient trending condition, Treatment, and Consult recommendations  Discharge Plan A: Skilled Nursing Facility                  Jame Tiwari MD  Department of Hospital Medicine   [facility] - Neurosurgery ([facility])

## 2024-08-15 NOTE — PT/OT/SLP PROGRESS
Occupational Therapy   Treatment    Name: Shaye Clemente  MRN: [id number]  Admitting Diagnosis:  Seizure-like activity       Recommendations:     Discharge Recommendations: Moderate Intensity Therapy  Discharge Equipment Recommendations:  hospital bed, lift device, wheelchair  Barriers to discharge:  None    Assessment:     Shaye Clemente is a 84 y.o. female with a medical diagnosis of Seizure-like activity.  She presents with decrease functional status secondary to medical diagnosis. Performance deficits affecting function are weakness, impaired endurance, impaired self care skills, impaired functional mobility, gait instability, decreased safety awareness, pain, decreased lower extremity function, impaired fine motor, impaired cognition, orthopedic precautions, decreased coordination. Patient with good tolerance to OT session; limited by decreased strength and activity tolerance limiting functional performance. Patient requiring increased assistance for ADL activity this date but able to utilize bedside commode with max assist for transfer. Patient remains appropriate candidate for acute OT services.     Rehab Prognosis:  Good; patient would benefit from acute skilled OT services to address these deficits and reach maximum level of function.       Plan:     Patient to be seen 4 x/week to address the above listed problems via therapeutic activities, self-care/home management, therapeutic exercises, neuromuscular re-education  Plan of Care Expires: 09/07/24  Plan of Care Reviewed with: patient    Subjective     Chief Complaint: none at this time   Patient/Family Comments/goals: DC   Pain/Comfort:  Pain Rating 1: 0/10    Objective:     Communicated with: RN prior to session.  Patient found supine with PureWick, oxygen upon OT entry to room.    General Precautions: Standard, fall    Orthopedic Precautions:LLE weight bearing as tolerated  Braces: N/A  Respiratory Status: Room air     Occupational Performance:     Bed Mobility:     Patient completed Rolling/Turning to Left with  moderate assistance  Patient completed Rolling/Turning to Right with moderate assistance  Patient completed Scooting/Bridging with maximal assistance  Patient completed Supine to Sit with moderate assistance and 2 persons for trunk and LE management   Patient completed Sit to Supine with moderate assistance and 2 persons for trunk and LE management   Patient sat EOB with CGA-standby assist     Functional Mobility/Transfers:  Patient completed Sit <> Stand Transfer:   Bed: mod x2 assist x2 trials   Bedside Commode: x2 trials with max assist patient able to stand ~1 minutes for perineal hygiene    Patient completed stand pivot from bedside commode > bed with max x2 assistance   Functional Mobility:   Patient completed ~4 steps via step transfer to commode with RW and mod x2 assist     Activities of Daily Living:  Upper Body Dressing: maximal assistance to don gown   Toileting: total assistance for perineal hygiene via both standing and rolling     Treatment & Education:  Provided education regarding role of OT, POC, & discharge recommendations.  Pt with no further questions/concerns at this time. OT provided education on home recommendations and fall prevention in preparation for D/C.     Patient left supine with all lines intact and call button in reach    GOALS:   Multidisciplinary Problems       Occupational Therapy Goals          Problem: Occupational Therapy    Goal Priority Disciplines Outcome Interventions   Occupational Therapy Goal     OT, PT/OT Progressing    Description: Goals to be met by: 09/07/2024     Patient will increase functional independence with ADLs by performing:    LE Dressing with Minimal Assistance  to lucie socks  Grooming while standing at sink with Contact Guard Assistance.  Toileting from toilet with Contact Guard Assistance for hygiene and clothing management.   Step transfer with Contact Guard Assistance  Toilet transfer to toilet with  Contact Guard Assistance.                         Time Tracking:     OT Date of Treatment: 08/15/24  OT Start Time: 1410  OT Stop Time: 1453  OT Total Time (min): 43 min    Billable Minutes:Self Care/Home Management 30  Therapeutic Activity 13    OT/THOM: OT          8/15/2024

## 2024-08-15 NOTE — PROGRESS NOTES
"[facility] - Neurosurgery ([facility])  Infectious Disease  Progress Note    Patient Name: Shaye Clemente  MRN: [id number]  Admission Date: 8/6/2024  Length of Stay: 9 days  Attending Physician: Jame Tiwari MD  Primary Care Provider: Skip Singh MD    Isolation Status: Contact  Assessment/Plan:      ID  Post op infection  84F with h/o pAFib (on xarelto), dementia, femoral neck fracture, s/p left bipolar hemiarthroplasty with hardware placed on 6/4/24 at [facility]. (Minimal records available at this time). Hosp course c/b bacteremia 06/24 1 of 2 bottles +proteus vulgaris (R-ampicillin, cefuroxime, tetracycline); and surgical wound infection. Left hip wound cx 06/27: +PSA (I-aztreonam), +Staph hemolyticus (R-tetracycline, S-vanc), +E.faecalis (panS), +proteus vulgaris (R-ampicillin, cefuroxime, Tetracycline). Pt underwent surgical debridement (no Op notes available) 07/01, surgical cxs NG.  Wound swab cxs 07/01 No growth. Also, records reveal ESBL-e.coli UTI 06/25. It seems pt has been maintained on Iv-Erta since then, despite not covering all bacteria isolated on cxs from infected surgical wound; and per family, pt was to complete prolonged course Erta ~6wks, with impending BRENDAN. -- Pt transferred to [facility] from [facility] for further work-up of unwitnessed seizure, and anemia. Unclear what provoked seizure. EEG was negative.    Ct-a/p and Ct-hip reveal stable hardware, but note non-specific soft-tissue fluid collection at lateral hip measuring 9.4 x 8.7 x 4.8cm, as well as IM hematoma of left iliopsoas, psoas, and iliacus muscle, with potential active hemorrhage.    ID was consulted as "Wound infection. Was on ertapenem at nursing home. Should this be continued" On 08/09, switched erta to merrem while awaiting additional records.    Stable.  Blood cultures finalized negative.  IR unsuccessfully attempted left hip.  Remains on meropenem.  Afebrile.   Repeat CT L hip shows fluid smaller since " 8/5.  Bone scan:here are scintigraphic findings to suggest postoperative changes of left hip arthroplasty pitted diffuse increase in the soft tissue and delayed uptake around the arthroplasty due to postoperative changes and inflammatory process.  If infection is strongly suspected, a follow-up indium labeled WBC study is recommended.  Her speech and alertness has improved. CRP improved from 287 to 110.  She denies any systemic signs of infection.    Recommendations:  Continue meropenem  Suspect may need re-treatment for left hip infection  Will need further records in order to give proper recommendation, specifically [facility] records including infectious disease and ortho surgery notes, as well as culture reports with sensitivities.   Spoke with patient's mother who reports she was followed by infectious disease, Dr. Zuhair Nascimento in [place].    -- ID will continue to follow w/ further recs.              Anticipated Disposition: tbd    Thank you for your consult. I will follow-up with patient. Please contact us if you have any additional questions.    OCTAVIA Murphy  Infectious Disease  [facility] - Neurosurgery (Hospital)    Subjective:     Principal Problem:Seizure-like activity    HPI: Ms. Clemente is a 85 yo female with PMH of left femoral neck fracture, s/p left bipolar hemiarthroplasty on 6/4/24, paroxysmal atrial fibrillation (on Xarelto), hypothyroidism, dementia, and hyperlipidemia admitted to [facility] (from [facility]) on August 4 with low hemoglobin. While at OSH, work up was nonrevealing for source of anemia, pt had reported tarry stools, though stool hemoccult was negative. Pt reported swelling around her left hip, and CT left hip noted soft tissue swelling/fluid in deep left pelvis, presacral region. Pt was also treated empirically for cysitits with cipro. During a CT of her AP, pt had a witnessed seizure that required keppra. Neurology was consulted and MRI brain and EEG without  "acute findings. While at OSH, ortho surgery and interventional radiology was consulted at [facility] and surgical intervention was not planned, though recommended to continue close monitoring of H/H and if continues to drop recommending CTA of abdomen/pelvis to see if IR intervention is indicated. Ultimately, pt was transferred to [facility] for EEG, neurology eval 2/2 new onset seizures, as well as close monitoring in H/H.     Prior to admission to [place], pt was sent to [facility] as there was concerns for left hip pain, redness, and swelling. Per limited records available, appears pt was bacteremic with proteus vulgaris on 6/24/24, also with Ecoli ESBL uti. It appears Dr. Dave Nascimento was following pt. Per ED notes on 7/25/24 from [facility], pt was noted that "pt underwent left hip debridement for post op left hip infection on July 1st, she had proteus in her tissues, culture later was positive for pseudomonas, staph hemolyticus, and enterococcus. Pt was only on rocephin, though it was planned to continue cefepime,  however it was discontinued and pt was started on rocephin alone."     ID was consulted d/t "Wound infection. Was on ertapenem at nursing home. Should this be continued"        Interval History:   No acute events overnight  IR consulted for left hip- drainage/aspiration attempted and was unsuccessful 8/14.  Reports not feeling well today  Left hip bone scan done.  Denies dysuria, fevers chills or sweats    Review of Systems   Constitutional:  Negative for chills, diaphoresis, fatigue and fever.   HENT: Negative.     Respiratory:  Negative for cough and shortness of breath.    Cardiovascular:  Negative for leg swelling.   Gastrointestinal:  Negative for blood in stool, diarrhea, nausea and vomiting.   Genitourinary:  Negative for difficulty urinating, dysuria, flank pain and pelvic pain.   Skin:  Negative for rash and wound.   Psychiatric/Behavioral:  Positive for confusion (though " oriented). Negative for agitation.      Objective:     Vital Signs (Most Recent):  Temp: 97.7 °F (36.5 °C) (08/15/24 1618)  Pulse: 68 (08/15/24 1618)  Resp: 18 (08/15/24 1628)  BP: (!) 149/68 (08/15/24 1618)  SpO2: (!) 94 % (08/15/24 1618) Vital Signs (24h Range):  Temp:  [97.7 °F (36.5 °C)-98.3 °F (36.8 °C)] 97.7 °F (36.5 °C)  Pulse:  [68-75] 68  Resp:  [18-20] 18  SpO2:  [94 %-99 %] 94 %  BP: (138-183)/(64-73) 149/68     Weight: 82.5 kg (181 lb 14.1 oz)  Body mass index is 28.49 kg/m².    Estimated Creatinine Clearance: 31.4 mL/min (A) (based on SCr of 1.5 mg/dL (H)).     Physical Exam  Vitals reviewed.   Constitutional:       General: She is not in acute distress.     Appearance: Normal appearance. She is ill-appearing. She is not toxic-appearing or diaphoretic.   HENT:      Head: Normocephalic.      Nose: Nose normal.      Mouth/Throat:      Mouth: Mucous membranes are moist.      Pharynx: Oropharynx is clear.   Eyes:      General:         Right eye: No discharge.         Left eye: No discharge.      Conjunctiva/sclera: Conjunctivae normal.   Cardiovascular:      Rate and Rhythm: Normal rate and regular rhythm.      Pulses: Normal pulses.      Heart sounds: Normal heart sounds. No murmur heard.  Pulmonary:      Effort: Pulmonary effort is normal. No respiratory distress.      Breath sounds: Normal breath sounds. No stridor.   Abdominal:      General: Abdomen is flat. There is no distension.      Palpations: Abdomen is soft.      Tenderness: There is no abdominal tenderness. There is no right CVA tenderness or left CVA tenderness.   Musculoskeletal:         General: Normal range of motion.      Cervical back: Normal range of motion.      Right lower leg: No edema.      Left lower leg: No edema.   Skin:     Findings: No lesion or rash.      Comments: With healed left hip incision, without surrounding swelling, redness. Without tenderness to palpation. Without drainage or underlying fluctuance.    Neurological:       Mental Status: She is alert and oriented to person, place, and time.      Comments: Mumbling speech resolved and more awake/ alert/ conversant and oriented x4    Psychiatric:         Mood and Affect: Mood normal.          Significant Labs: Blood Culture:   Recent Labs   Lab 08/04/24  1135 08/04/24  1202 08/07/24  1007   LABBLOO No growth after 5 days. No growth after 5 days. No growth after 5 days.     CBC:   Recent Labs   Lab 08/14/24  0925 08/15/24  0507   WBC 8.40 8.16   HGB 10.0* 8.9*   HCT 31.4* 27.3*    207       CMP:   Recent Labs   Lab 08/14/24  0925 08/15/24  0507   * 135*   K 3.5 3.4*    104   CO2 23 26   GLU 72 86   BUN 22 25*   CREATININE 1.2 1.5*   CALCIUM 8.8 8.7   PROT 8.3 7.8   ALBUMIN 2.2* 2.1*   BILITOT 0.6 0.5   ALKPHOS 119 126   AST 27 26   ALT 11 10   ANIONGAP 7* 5*       Microbiology Results (last 7 days)       Procedure Component Value Units Date/Time    Culture, Anaerobic [[id number]]     Order Status: No result Specimen: Joint Fluid from Hip, Left     AFB Culture & Smear [[id number]]     Order Status: No result Specimen: Joint Fluid from Hip, Left     Fungus culture [[id number]]     Order Status: No result Specimen: Joint Fluid from Hip, Left     Gram stain [[id number]]     Order Status: No result Specimen: Joint Fluid from Hip, Left     Aerobic culture [[id number]]     Order Status: No result Specimen: Hip     Blood culture [[id number]] Collected: 08/07/24 1007    Order Status: Completed Specimen: Blood Updated: 08/12/24 1212     Blood Culture, Routine No growth after 5 days.    Narrative:      Picc line          Urine Culture:   Recent Labs   Lab 08/04/24  1452   LABURIN ENTEROCOCCUS FAECIUM VRE  50,000 - 99,999 cfu/ml  *     Urine Studies:   Recent Labs   Lab 08/04/24  1452 08/09/24  0919   COLORU Yellow Yellow   APPEARANCEUA Clear Clear   PHUR 7.0 7.0   SPECGRAV 1.015 1.015   PROTEINUA 2+* 2+*   GLUCUA Negative Negative   KETONESU Negative Negative   BILIRUBINUA  Negative Negative   OCCULTUA Trace* Trace*   NITRITE Negative Negative   UROBILINOGEN Negative  --    LEUKOCYTESUR Trace* Negative   RBCUA 4 3   WBCUA 8* 2   BACTERIA Many* Rare   SQUAMEPITHEL 4  --    HYALINECASTS 0 3*     All pertinent labs within the past 24 hours have been reviewed.    Significant Imaging: I have reviewed all pertinent imaging results/findings within the past 24 hours.  NM Bone Scan 3 Phase Hip [[id number]] Resulted: 08/15/24 1459   Order Status: Completed Updated: 08/15/24 1501   Narrative:     EXAMINATION:  Three Phase Bone scan >    CLINICAL HISTORY:  Osteomyelitis suspected, hip, xray done;  status post left hip arthroplasty fluid collection and presumed intramuscular hematoma within the left iliopsoas    TECHNIQUE:  Following the IV administration of 21.6 mCi of Tc-99m-MDP, immediate dynamic and early static images of the hip joints were acquired followed by delayed anterior and posterior images of the same region.    COMPARISON:  CT Pelvis 08/13/24    FINDINGS:  On the flow and blood pool images there is flow symmetrical and asymmetric uptake of the tracer in soft tissue around the left hip arthroplasty.    On the delayed views, there is physiologic distribution of the tracer in kidneys and bladder with regions of increased uptake around the left hip arthroplasty at the acetabulum and trochanteric area and inflammatory process.   Impression:       There are scintigraphic findings to suggest postoperative changes of left hip arthroplasty pitted diffuse increase in the soft tissue and delayed uptake around the arthroplasty due to postoperative changes and inflammatory process.  If infection is strongly suspected, a follow-up indium labeled WBC study is recommended      Electronically signed by: Johana Noriega  Date: 08/15/2024  Time: 14:59     CT Pelvis With IV Contrast [[id number]] Resulted: 08/13/24 1415   Order Status: Completed Updated: 08/13/24 1418   Narrative:     EXAMINATION:  CT  PELVIS WITH IV CONTRAST    CLINICAL HISTORY:  Evaluate pelvic fluid collection;    TECHNIQUE:  Low dose axial images, sagittal and coronal reformations were obtained from the iliac crests to the pubic symphysis after the administration of 75 cc Omnipaque 350 intravenous contrast.  Oral contrast was not administered.    COMPARISON:  CT abdomen pelvis 08/05/2024.  CT hip 08/04/2024. Hip radiograph 08/12/2024.    FINDINGS:  BOWEL/MESENTERY: No evidence of bowel obstruction or inflammatory process.    REPRODUCTIVE: Hysterectomy.    URINARY BLADDER: Mildly distended.  Small foci of intraluminal air, likely related to recent instrumentation.    VASCULATURE: No abdominal aortic aneurysm. No significant atherosclerotic calcification.    BONES/EXTRAPERITONEAL SOFT TISSUES: Recent postoperative change of left hip arthroplasty.  Hardware appears intact without perihardware lucency.  Overlying irregular fluid collection measuring approximately 7.0 x 5.1 cm maximal axial dimension (axial image 54).  Surrounding stranding, tracking towards the cutaneous surface and inferiorly along the left quadriceps musculature.  Overall appearance is decreased in size compared to prior 08/05/2024.    Additional heterogeneous fluid collection tracking superiorly within the substance of the left iliopsoas, measuring 4.0 x 2.9 cm maximal axial dimension (axial image 28).  Hyperdense components suggestive of more recent blood products. Overall appearance is slightly increased in size compared to prior 08/05/2024, noting greater hypodense component compatible with aging blood products.    OTHER: Layering hyperattenuation in the partially visualized gallbladder, similar to prior.    Trace pelvic free fluid.   Impression:       Recent postoperative change of left hip arthroplasty.  Overlying irregular fluid collection appears decreased in size compared to prior CT 08/05/2024.    Presumed intramuscular hematoma within the left iliopsoas, appears  slightly increased in size.  Hyperdense components suggestive of more recent blood products.  Hemorrhagic iliopsoas bursitis less likely.    Electronically signed by resident: Hansel Saucedo  Date: 08/13/2024  Time: 13:34    Electronically signed by: Lalo Orozco MD  Date: 08/13/2024  Time: 14:15     X-Ray Hip 2 or 3 views Left with Pelvis when performed [[id number]] Resulted: 08/12/24 1453   Order Status: Completed Updated: 08/12/24 1455   Narrative:     EXAMINATION:  XR HIP WITH PELVIS WHEN PERFORMED 2 OR 3 VIEWS LEFT    CLINICAL HISTORY:  left hip fluid collection per CT read;    TECHNIQUE:  AP view of the pelvis and frog leg lateral view of the left hip were performed.    COMPARISON:  CT hip August 4, 2024    FINDINGS:  There are surgical changes of total right hip arthroplasty.  The surrounding osseous structures are intact without fracture or osseous destructive process.   Impression:       As above      Electronically signed by: Joceline Freire MD  Date: 08/12/2024  Time: 14:53   X-Ray Chest 1 View [[id number]] Resulted: 08/08/24 2126   Order Status: Completed Updated: 08/08/24 2128   Narrative:     EXAMINATION:  XR CHEST 1 VIEW    CLINICAL HISTORY:  concern for aspiration;    TECHNIQUE:  Single frontal view of the chest was performed.    COMPARISON:  08/04/2024.    FINDINGS:  There are continued coarse chronic interstitial opacities, similar prior.  There is no new focal consolidation or significant detrimental change from prior.  There are multiple surgical clips.  The pleural spaces are clear.  The cardiac silhouette is enlarged.  There are calcifications of the aortic arch.  Osseous structures demonstrate degenerative changes.   Impression:       No significant detrimental change from prior study.      Electronically signed by: Ernst Whipple  Date: 08/08/2024  Time: 21:26      Imaging History     2024    Date Procedure Name Study Review Link PACS Link Status Accession Number Location   08/12/24 02:09 PM  X-Ray Hip 2 or 3 views Left with Pelvis when performed Study Review  Images Final [id number] [facility]   08/11/24 04:04 PM Cardiac monitoring strips Study Review  Final     08/09/24 01:21 AM Cardiac monitoring strips Study Review  Final     08/08/24 08:32 PM X-Ray Chest 1 View Study Review  Images Final [id number] [facility]   08/05/24 02:04 PM Echo Saline Bubble? No; Ultrasound enhancing contrast? No Study Review  Images Final [id number] [facility]   08/05/24 12:02 PM MRI Brain Without Contrast Study Review  Images Final [id number] [facility]   08/05/24 12:00 PM CT Abdomen Pelvis  Without Contrast Study Review  Images Final [id number] [facility]   08/04/24 06:55 PM CT Hip Without Contrast Left Study Review  Images Final [id number] [facility]   08/04/24 12:36 PM X-Ray Chest AP Portable Study Review  Images Final [id number] [facility]   08/04/24 12:30 PM CT Head Without Contrast Study Review  Images Final [id number] [facility]

## 2024-08-16 LAB
ALBUMIN SERPL BCP-MCNC: 2.1 G/DL (ref 3.5–5.2)
ALP SERPL-CCNC: 128 U/L (ref 55–135)
ALT SERPL W/O P-5'-P-CCNC: 9 U/L (ref 10–44)
ANION GAP SERPL CALC-SCNC: 7 MMOL/L (ref 8–16)
AST SERPL-CCNC: 26 U/L (ref 10–40)
BASOPHILS # BLD AUTO: 0.05 K/UL (ref 0–0.2)
BASOPHILS NFR BLD: 0.6 % (ref 0–1.9)
BILIRUB SERPL-MCNC: 0.5 MG/DL (ref 0.1–1)
BUN SERPL-MCNC: 22 MG/DL (ref 8–23)
CALCIUM SERPL-MCNC: 8.8 MG/DL (ref 8.7–10.5)
CHLORIDE SERPL-SCNC: 103 MMOL/L (ref 95–110)
CO2 SERPL-SCNC: 24 MMOL/L (ref 23–29)
CREAT SERPL-MCNC: 1.2 MG/DL (ref 0.5–1.4)
CRP SERPL-MCNC: 64.9 MG/L (ref 0–8.2)
DIFFERENTIAL METHOD BLD: ABNORMAL
EOSINOPHIL # BLD AUTO: 0.7 K/UL (ref 0–0.5)
EOSINOPHIL NFR BLD: 8.6 % (ref 0–8)
ERYTHROCYTE [DISTWIDTH] IN BLOOD BY AUTOMATED COUNT: 20.5 % (ref 11.5–14.5)
EST. GFR  (NO RACE VARIABLE): 44.9 ML/MIN/1.73 M^2
GLUCOSE SERPL-MCNC: 97 MG/DL (ref 70–110)
HCT VFR BLD AUTO: 26.8 % (ref 37–48.5)
HGB BLD-MCNC: 8.8 G/DL (ref 12–16)
IMM GRANULOCYTES # BLD AUTO: 0.11 K/UL (ref 0–0.04)
IMM GRANULOCYTES NFR BLD AUTO: 1.3 % (ref 0–0.5)
LYMPHOCYTES # BLD AUTO: 1.8 K/UL (ref 1–4.8)
LYMPHOCYTES NFR BLD: 21.3 % (ref 18–48)
MCH RBC QN AUTO: 31.5 PG (ref 27–31)
MCHC RBC AUTO-ENTMCNC: 32.8 G/DL (ref 32–36)
MCV RBC AUTO: 96 FL (ref 82–98)
MONOCYTES # BLD AUTO: 1.1 K/UL (ref 0.3–1)
MONOCYTES NFR BLD: 13.6 % (ref 4–15)
NEUTROPHILS # BLD AUTO: 4.5 K/UL (ref 1.8–7.7)
NEUTROPHILS NFR BLD: 54.6 % (ref 38–73)
NRBC BLD-RTO: 0 /100 WBC
PLATELET # BLD AUTO: 195 K/UL (ref 150–450)
PMV BLD AUTO: 10.2 FL (ref 9.2–12.9)
POCT GLUCOSE: 106 MG/DL (ref 70–110)
POCT GLUCOSE: 97 MG/DL (ref 70–110)
POTASSIUM SERPL-SCNC: 3.7 MMOL/L (ref 3.5–5.1)
PROT SERPL-MCNC: 7.9 G/DL (ref 6–8.4)
RBC # BLD AUTO: 2.79 M/UL (ref 4–5.4)
SODIUM SERPL-SCNC: 134 MMOL/L (ref 136–145)
WBC # BLD AUTO: 8.25 K/UL (ref 3.9–12.7)

## 2024-08-16 PROCEDURE — 97112 NEUROMUSCULAR REEDUCATION: CPT

## 2024-08-16 PROCEDURE — 36415 COLL VENOUS BLD VENIPUNCTURE: CPT | Performed by: HOSPITALIST

## 2024-08-16 PROCEDURE — 27000207 HC ISOLATION

## 2024-08-16 PROCEDURE — 25000003 PHARM REV CODE 250: Performed by: STUDENT IN AN ORGANIZED HEALTH CARE EDUCATION/TRAINING PROGRAM

## 2024-08-16 PROCEDURE — 80053 COMPREHEN METABOLIC PANEL: CPT | Performed by: HOSPITALIST

## 2024-08-16 PROCEDURE — 97530 THERAPEUTIC ACTIVITIES: CPT

## 2024-08-16 PROCEDURE — 11000001 HC ACUTE MED/SURG PRIVATE ROOM

## 2024-08-16 PROCEDURE — 25000003 PHARM REV CODE 250: Performed by: HOSPITALIST

## 2024-08-16 PROCEDURE — 63600175 PHARM REV CODE 636 W HCPCS: Performed by: STUDENT IN AN ORGANIZED HEALTH CARE EDUCATION/TRAINING PROGRAM

## 2024-08-16 PROCEDURE — 99900035 HC TECH TIME PER 15 MIN (STAT)

## 2024-08-16 PROCEDURE — 92526 ORAL FUNCTION THERAPY: CPT

## 2024-08-16 PROCEDURE — 27000221 HC OXYGEN, UP TO 24 HOURS

## 2024-08-16 PROCEDURE — 86140 C-REACTIVE PROTEIN: CPT | Performed by: HOSPITALIST

## 2024-08-16 PROCEDURE — 85025 COMPLETE CBC W/AUTO DIFF WBC: CPT | Performed by: HOSPITALIST

## 2024-08-16 PROCEDURE — 94761 N-INVAS EAR/PLS OXIMETRY MLT: CPT

## 2024-08-16 PROCEDURE — 97535 SELF CARE MNGMENT TRAINING: CPT

## 2024-08-16 RX ADMIN — DOCUSATE SODIUM 100 MG: 100 CAPSULE, LIQUID FILLED ORAL at 08:08

## 2024-08-16 RX ADMIN — ATORVASTATIN CALCIUM 40 MG: 40 TABLET, FILM COATED ORAL at 09:08

## 2024-08-16 RX ADMIN — CARVEDILOL 12.5 MG: 12.5 TABLET, FILM COATED ORAL at 09:08

## 2024-08-16 RX ADMIN — HYDROCODONE BITARTRATE AND ACETAMINOPHEN 1 TABLET: 5; 325 TABLET ORAL at 10:08

## 2024-08-16 RX ADMIN — MEROPENEM 2 G: 2 INJECTION, POWDER, FOR SOLUTION INTRAVENOUS at 12:08

## 2024-08-16 RX ADMIN — FERROUS SULFATE TAB EC 325 MG (65 MG FE EQUIVALENT) 1 EACH: 325 (65 FE) TABLET DELAYED RESPONSE at 08:08

## 2024-08-16 RX ADMIN — LEVETIRACETAM 500 MG: 500 TABLET, FILM COATED ORAL at 08:08

## 2024-08-16 RX ADMIN — MEMANTINE 5 MG: 5 TABLET ORAL at 09:08

## 2024-08-16 RX ADMIN — MONTELUKAST 10 MG: 10 TABLET, FILM COATED ORAL at 09:08

## 2024-08-16 RX ADMIN — MEMANTINE 5 MG: 5 TABLET ORAL at 08:08

## 2024-08-16 RX ADMIN — DOCUSATE SODIUM 100 MG: 100 CAPSULE, LIQUID FILLED ORAL at 09:08

## 2024-08-16 RX ADMIN — LEVOTHYROXINE SODIUM 25 MCG: 25 TABLET ORAL at 06:08

## 2024-08-16 RX ADMIN — MEROPENEM 2 G: 2 INJECTION, POWDER, FOR SOLUTION INTRAVENOUS at 11:08

## 2024-08-16 RX ADMIN — LEVETIRACETAM 500 MG: 500 TABLET, FILM COATED ORAL at 09:08

## 2024-08-16 RX ADMIN — POLYETHYLENE GLYCOL 3350 17 G: 17 POWDER, FOR SOLUTION ORAL at 08:08

## 2024-08-16 RX ADMIN — CARVEDILOL 12.5 MG: 12.5 TABLET, FILM COATED ORAL at 08:08

## 2024-08-16 NOTE — PLAN OF CARE
Yossi Vargas - Neurosurgery (Hospital)  Discharge Reassessment    Primary Care Provider: Skip Singh MD    Expected Discharge Date: 8/19/2024    Reassessment (most recent)       Discharge Reassessment - 08/16/24 1344          Discharge Reassessment    Assessment Type Discharge Planning Reassessment (P)      Discharge Plan discussed with: Sibling (P)      Name(s) and Number(s) Adriane (P)      Communicated KATI with patient/caregiver Yes (P)      Discharge Plan A Skilled Nursing Facility (P)      DME Needed Upon Discharge  none (P)      Transition of Care Barriers None (P)         Post-Acute Status    Post-Acute Authorization Placement (P)      Post-Acute Placement Status Pending payor review/awaiting authorization (if required) (P)      Discharge Delays Change in Medical Condition (P)                    Patient is not medically cleared.  Pending ID recs for discharge to SNF.  Patient has been accepted at [facility] with anticipated discharge date of 8/19.  They have submitted for auth.      Discharge Plan A and Plan B have been determined by review of patient's clinical status, future medical and therapeutic needs, and coverage/benefits for post-acute care in coordination with multidisciplinary team members.    Gladys Peterson, JESUS  [facility]  [phone]

## 2024-08-16 NOTE — PT/OT/SLP PROGRESS
Physical Therapy Co-Treatment    Patient Name: Shaye Clemente   MRN: [id number]    Co-treatment performed for this visit due to patient need for two skilled therapists to ensure patient and staff safety and to accommodate for patient activity tolerance/pain management   Recommendations:     Discharge Recommendations: Moderate Intensity Therapy  Discharge Equipment Recommendations: hospital bed, lift device, wheelchair  Barriers to Discharge: Increased level of assist and Decreased caregiver support  Safest Mobility Level with Nursing: Stand pivot transfer with maximal assistance    Assessment:     Shaye Clemente is a 84 y.o. female admitted with a medical diagnosis of Seizure-like activity. She presents with the following impairments/functional limitations: weakness, impaired endurance, impaired self care skills, impaired functional mobility, gait instability, impaired balance, decreased safety awareness, pain, decreased lower extremity function, decreased coordination, orthopedic precautions, impaired cardiopulmonary response to activity. Pt presenting with HOB elevated and agreeable to completing therapy session. Pt found to be soiled with incontinent BM; increased time spent completing pericare and changing linens. Pt continues to require increased assistance to complete all visualized functional mobility 2/2 decreased motor initiation, L hip pain, and decreased activity tolerance. Pt able to complete stand pivot transfer with notable assistance due to pain resulting in pt with limited weightbearing in LLE. Pt remains appropriate for moderate intensity therapy following discharge once medically stable. Pt would continue to benefit from skilled acute PT in order to address current deficits and progress functional mobility.     Rehab Prognosis: Good; patient continues to benefit from acute skilled PT services to address these deficits and reach maximum level of function.  Recent Surgery: * No surgery found *      Plan:  "    During this hospitalization, patient to be seen 4 x/week to address the identified rehab impairments via gait training, therapeutic activities, therapeutic exercises, neuromuscular re-education and progress toward the following goals:    Plan of Care Expires:  09/07/24    Subjective     Chief Complaint: L hip pain  Patient/Family Comments/Goals: "I just want this to feel better."  Pain/Comfort:  Pain Rating 1:  (unrated)  Location - Side 1: Left  Location - Orientation 1: generalized  Location 1: leg  Pain Addressed 1: Reposition, Distraction, Nurse notified  Pain Rating Post-Intervention 1:  (unrated)    Objective:     Communicated with RN prior to session. Patient found HOB elevated with oxygen, PureWick upon PT entry to room.     General Precautions: Standard, fall, contact  Orthopedic Precautions: LLE weight bearing as tolerated  Braces: N/A    Functional Mobility:  Bed Mobility: Verbal cues for sequencing and technique   Rolling Left:  moderate assistance  Rolling Right: moderate assistance  Scooting: moderate assistance  Supine to Sit: moderate assistance of 2 persons for LE management and trunk management with HOB elevated using bed rail  Transfers:    Sit to Stand: moderate assistance of 2 persons with no AD with cues for hand placement and foot placement  Verbal cues for increased use of momentum, improved anterior weight shift, and increased BLE motor activation  Bed to Chair: maximal assistance with hand-held assist with cues for hand placement and foot placement using Stand Pivot  Gait:   Deferred 2/2 increased assistance for transfers  Balance:   Static Sitting: Good, able to maintain for 4 minute(s) with stand by assistance  Dynamic Sitting: Fair: Patient accepts minimal challenge, minimum assistance 2/2 R lateral lean  Verbal and tactile cues provided for upright posture, increased cervical extension, maintenance of midline orientation, anterior trunk lean, core activation, command following, and " improved used of BUE for support  Static Standing: Poor, able to maintain for 10 seconds with moderate assistance of 2 persons  Verbal cues for upright posture, increased hip extension, increased knee extension, and maintenance of midline orientation  Dynamic Standing: not assessed this visit    AM-PAC 6 CLICK MOBILITY  Turning over in bed (including adjusting bedclothes, sheets and blankets)?: 2  Sitting down on and standing up from a chair with arms (e.g., wheelchair, bedside commode, etc.): 2  Moving from lying on back to sitting on the side of the bed?: 2  Moving to and from a bed to a chair (including a wheelchair)?: 2  Need to walk in hospital room?: 1  Climbing 3-5 steps with a railing?: 1  Basic Mobility Total Score: 10     Therapeutic Activities and Exercises:  Patient educated on role of acute care PT and PT POC, safety while in hospital including calling nurse for mobility, and call light usage  Pt educated on the effects of bed rest and the importance of OOB activity. Pt encouraged to sit UIC majority of day as tolerated and continue daily transfers with nursing assist. Pt verbalized understanding.  Pt educated on importance of maximal participation in therapy session in order to reduce negative effects of prolonged sedentary positioning.   Answered all questions within PT scope of practice and addressed functional mobility concerns.  Pt soiled with incontinent BM at start of session. Pericare completed and bed linens changed with PCT assist.    Patient left up in chair with all lines intact, call button in reach, RN notified, and OT present.    GOALS:   Multidisciplinary Problems       Physical Therapy Goals          Problem: Physical Therapy    Goal Priority Disciplines Outcome Goal Variances Interventions   Physical Therapy Goal     PT, PT/OT Progressing     Description: Goals to be met by: 24     Patient will increase functional independence with mobility by performin. Supine to sit with  MInimal Assistance  2. Sit to supine with MInimal Assistance  3. Sit to stand transfer with Minimal Assistance  4. Bed to chair transfer with Minimal Assistance using LRAD as needed  5. Gait  x 150 feet with Minimal Assistance using LRAD as needed.   6. Ascend/descend 3 stair with no Handrails and Minimal Assistance  7. Lower extremity exercise program x15 reps per handout, with assistance as needed    Hospital Bed - Patient requires a hospital bed for positioning of the body in ways that are not feasible with an ordinary bed. The patient requires special positioning for pain relief, limited mobility, and/or being unable to independently make changes in body position without the use of a hospital bed. Pillows and wedges will not be adequate for resolving these positional issues.     Wheelchair - Patient has a mobility limitation that significantly impairs their ability to participate in one or more mobility related activities of daily living in customary locations in the home. The mobility limitation cannot be sufficiently resolved by the use of a cane or walker. The use of a manual wheelchair will greatly improve the patient's ability to participate in MRADLs. The patient will use the wheelchair on a regular basis at home. They have expressed their willingness to use a manual wheelchair in the home, and have a caregiver who is available and willing to assist with the wheelchair if needed.                         Time Tracking:     PT Received On: 08/16/24  PT Start Time: 1017     PT Stop Time: 1040  PT Total Time (min): 23 min     Billable Minutes: Therapeutic Activity 13 and Neuromuscular Re-education 10      Treatment Type: Treatment  PT/PTA: PT     Number of PTA visits since last PT visit: 0     08/16/2024

## 2024-08-16 NOTE — CARE UPDATE
I have reviewed the chart of Shaye Clemente who is hospitalized for the following:    Active Hospital Problems    Diagnosis    *Seizure-like activity    Left hip pain    Delirium    Wound infection    Antibiotic long-term use    Post op infection    Chronic anticoagulation     On xarelto      Hypophosphatemia     Monitor with daily labs  replace      Hyponatremia     Na 135  Monitor with daily cmp  improving      Hypokalemia     K 3.2 POA  Monitor with daily cmp  replace      Hypothyroidism     On synthroid       Dementia    Hypertension    Hip hematoma, left    Encephalopathy, metabolic    LUIS CARLOS (acute kidney injury)    Paroxysmal atrial fibrillation        Mikala Brar NP  [facility]

## 2024-08-16 NOTE — PT/OT/SLP PROGRESS
Occupational Therapy   Co-Treatment    Name: Shaye Clemente  MRN: [id number]  Admitting Diagnosis:  Seizure-like activity       Recommendations:     Discharge Recommendations: Moderate Intensity Therapy  Discharge Equipment Recommendations:  hospital bed, lift device, wheelchair  Barriers to discharge:  None    Assessment:     Shaye Clemente is a 84 y.o. female with a medical diagnosis of Seizure-like activity.  She presents with decrease functional status secondary to medical diagnosis. Performance deficits affecting function are weakness, impaired endurance, impaired self care skills, impaired functional mobility, gait instability, decreased safety awareness, pain, decreased lower extremity function, impaired fine motor, impaired cognition, orthopedic precautions, decreased coordination. Patient with fair tolerance to OT session this date session with focus on ADL 2/2 patient with BM upon OT entry. Patient able to compelte stand pivot transfer with increased assistance at this time but is significantly limited by functional ambulation and unable to complete self care activities independently at thsi time. Patient remains appropriate candidate for acute OT services.     Rehab Prognosis:  Good; patient would benefit from acute skilled OT services to address these deficits and reach maximum level of function.       Plan:     Patient to be seen 4 x/week to address the above listed problems via self-care/home management, therapeutic activities, therapeutic exercises, neuromuscular re-education  Plan of Care Expires: 08/19/24  Plan of Care Reviewed with: patient    Subjective     Chief Complaint: Generalized pain  Patient/Family Comments/goals: DC     Objective:     Communicated with: RN prior to session.  Patient found supine with oxygen, PureWick upon OT entry to room.    General Precautions: Standard, fall, contact    Orthopedic Precautions:LLE weight bearing as tolerated  Braces: N/A  Respiratory Status: Room air      Occupational Performance:     Bed Mobility:    Patient completed Rolling/Turning to Left with  moderate assistance  Patient completed Rolling/Turning to Right with moderate assistance  Patient completed Supine to Sit with moderate assistance with HOB elevated for trunk management     Functional Mobility/Transfers:  Patient completed Sit <> Stand Transfer with moderate assistance and of 2 persons  with  hand-held assist   Functional Mobility: Patient completed bed > chair with max x2 assist    Activities of Daily Living:  Grooming: moderate assistance for oral care and facial grooming   Toileting: total assistance via rolling for perineal hygiene 2/2 BM     Treatment & Education:  Co-Treatment conducted due to patient medical instability and to promote patient safety. Provided education regarding role of OT, POC, & discharge recommendations.  Pt with no further questions/concerns at this time. OT provided education on home recommendations and fall prevention in preparation for D/C.     Patient left up in chair with all lines intact and call button in reach    GOALS:   Multidisciplinary Problems       Occupational Therapy Goals          Problem: Occupational Therapy    Goal Priority Disciplines Outcome Interventions   Occupational Therapy Goal     OT, PT/OT Progressing    Description: Goals to be met by: 09/07/2024     Patient will increase functional independence with ADLs by performing:    LE Dressing with Minimal Assistance  to lucie socks  Grooming while standing at sink with Contact Guard Assistance.  Toileting from toilet with Contact Guard Assistance for hygiene and clothing management.   Step transfer with Contact Guard Assistance  Toilet transfer to toilet with Contact Guard Assistance.                         Time Tracking:     OT Date of Treatment: 08/16/24  OT Start Time: 1017  OT Stop Time: 1050  OT Total Time (min): 33 min    Billable Minutes:Self Care/Home Management 15  Therapeutic Activity  15    OT/THOM: OT          8/16/2024

## 2024-08-16 NOTE — PT/OT/SLP PROGRESS
Speech Language Pathology Treatment    Patient Name:  Shaye Clemente   MRN:  [id number]  Admitting Diagnosis: Seizure-like activity    Recommendations:                 General Recommendations:  Dysphagia therapy  Diet recommendations:  Regular Diet - IDDSI Level 7, Liquid Diet Level: Thin liquids - IDDSI Level 0   Aspiration Precautions: Small bites/sips and Standard aspiration precautions   General Precautions: Standard, fall, contact  Communication strategies:  none    Assessment:     Shaye Clemente is a 84 y.o. female with an SLP diagnosis of Dysphagia.    Subjective     Awake/alert    Pain/Comfort:  Pain Rating 1: 0/10  Pain Rating Post-Intervention 1: 0/10    Respiratory Status: Room air    Objective:     Has the patient been evaluated by SLP for swallowing?   Yes  Keep patient NPO? No     Pt repositioned upright in bed for PO trials. She denied any difficulty with PO intake. Kj cracker x5 with peanut butter and milk via straw x8 tolerated with timely swallow initiation, adequate oral clearance and no overt s/s of airway compromise. SLP reviewed swallow precautions with pt who verbalized understanding. Recommend regular diet/thin liquids at this time.  SLP will follow up.    Goals:   Multidisciplinary Problems       SLP Goals          Problem: SLP    Goal Priority Disciplines Outcome   SLP Goal     SLP Progressing   Description: Speech Language Pathology Goals  Goals expected to be met by 8/15/24  1. Pt will tolerate least restrictive diet without overt S/S aspiration, Supervision  2. Pt will participate in full assessment of speech, language and cognition to determine ongoing POC needs -met  3. Educate Pt and family on aspiration precautions and SLP POC  4. Pt will independently verbalize/demonstrate 3 speech strategies to increase intelligibility to 75% or greater in known and unknown contexts.   5. Pt will demonstrate adequate topic maintenance and/or attend to therapy tasks for 1+ minutes given no cues to  redirect.   6. Pt will answer safety awareness/problem solving questions with 75% acc min cues.   7. Pt will follow 2+ step directions with 75% acc given 1 repetition.   8. Pt will complete functional sequencing tasks with 75% acc min cues.                        Plan:     Patient to be seen:  3 x/week   Plan of Care expires:  09/06/24  Plan of Care reviewed with:  patient   SLP Follow-Up:  Yes       Discharge recommendations:  Low Intensity Therapy       Time Tracking:     SLP Treatment Date:   08/16/24  Speech Start Time:  1003  Speech Stop Time:  1014     Speech Total Time (min):  11 min    Billable Minutes: Treatment Swallowing Dysfunction 11    08/16/2024

## 2024-08-16 NOTE — PLAN OF CARE
Problem: Adult Inpatient Plan of Care  Goal: Plan of Care Review  Outcome: Progressing  Goal: Patient-Specific Goal (Individualized)  Outcome: Progressing  Goal: Absence of Hospital-Acquired Illness or Injury  Outcome: Progressing  Goal: Optimal Comfort and Wellbeing  Outcome: Progressing  Goal: Readiness for Transition of Care  Outcome: Progressing     Problem: Infection  Goal: Absence of Infection Signs and Symptoms  Outcome: Progressing     Problem: Skin Injury Risk Increased  Goal: Skin Health and Integrity  Outcome: Progressing     Problem: Acute Kidney Injury/Impairment  Goal: Fluid and Electrolyte Balance  Outcome: Progressing  Goal: Improved Oral Intake  Outcome: Progressing  Goal: Effective Renal Function  Outcome: Progressing     Problem: Wound  Goal: Optimal Coping  Outcome: Progressing  Goal: Optimal Functional Ability  Outcome: Progressing  Goal: Absence of Infection Signs and Symptoms  Outcome: Progressing  Goal: Improved Oral Intake  Outcome: Progressing  Goal: Optimal Pain Control and Function  Outcome: Progressing  Goal: Skin Health and Integrity  Outcome: Progressing  Goal: Optimal Wound Healing  Outcome: Progressing

## 2024-08-16 NOTE — SUBJECTIVE & OBJECTIVE
Interval History:  No reports of any chest pain or shortness a breath.  Afebrile.    Review of Systems  Objective:     Vital Signs (Most Recent):  Temp: 97.7 °F (36.5 °C) (08/16/24 1524)  Pulse: 66 (08/16/24 1524)  Resp: 18 (08/16/24 1524)  BP: 139/61 (08/16/24 1524)  SpO2: 96 % (08/16/24 1524) Vital Signs (24h Range):  Temp:  [97.5 °F (36.4 °C)-98 °F (36.7 °C)] 97.7 °F (36.5 °C)  Pulse:  [61-76] 66  Resp:  [16-18] 18  SpO2:  [90 %-99 %] 96 %  BP: (123-183)/(58-80) 139/61     Weight: 82.5 kg (181 lb 14.1 oz)  Body mass index is 28.49 kg/m².  No intake or output data in the 24 hours ending 08/16/24 1555      Physical Exam      Clear lungs bilaterally, unlabored breathing, on room air, no cyanosis   No facial droop, no slurred speech   Heart sounds indicate a regular rate and rhythm   Awake alert, no acute distress   Oriented x3   No obvious upper nor lower extremity edema   5/5 proximal upper and lower extremity strength bilaterally including fist  and hip flexor strength

## 2024-08-16 NOTE — PROGRESS NOTES
Yossi Vargas - Neurosurgery ([facility])  [facility] Medicine  Progress Note    Patient Name: Shaye Clemente  MRN: [id number]  Patient Class: IP- Inpatient   Admission Date: 8/6/2024  Length of Stay: 10 days  Attending Physician: Jame Tiwari MD  Primary Care Provider: Skip Singh MD        Subjective:     Principal Problem:Seizure-like activity        HPI:  84-year-old female with a left hip hemiarthroplasty in June (by report had hemiarthroplasty of closed comminuted femoral neck hip fracture), paroxysmal atrial fibrillation (on Xarelto), hypothyroidism, dementia, and hyperlipidemia admitted to [facility] on August 4 with low hemoglobin.  Patient reported tarry stools for several days, but Hemoccult was negative in the emergency department.  She also noted discomfort and swelling around her left hip.  Labs in the emergency department were concerning for anemia with hemoglobin 6.5.  There was also concern for possible cystitis, and she was placed on ciprofloxacin.  CT of the head had no acute abnormalities, and CT of the left hip showed soft tissue fullness and fluid in the deep left pelvis and presacral region.  Initial EKG showed sinus rhythm.  She was transfused 2 units packed red blood cells and admitted to Hospital Medicine.  With transfusion, hemoglobin increased to 9.3.  She was confused during her stay.  She was taken for CT of the abdomen and pelvis on the morning of August 5 when she began to have seizure activity.  She had fluttering of her eyes and movement of her head along with dorsiflexion of both feet.  She was given Keppra and placed on twice daily Keppra.  MRI brain had no acute intracranial abnormality.     She underwent Neurology tele-consultation, and recommendations included additional Keppra along with Q 4 hour neuro checks and routine EEG.  She was lethargic and required repeated stimulation for her to participate during examination.  She was oriented to person and time.  She was  able to follow simple commands.  Mentation appears to be slowly improving.      She underwent noncontrasted CT of the abdomen and pelvis that showed a subcutaneous fluid collection overlying the left hip which may represent resolving hematoma or postsurgical seroma.  There were also findings suspicious for intramuscular hematoma in the left iliopsoas, psoas, and iliacus.  Transfer center spoke with Orthopedic Surgery at [facility].  At present, there does not appear to be surgical intervention indicated in the left hip.  Case also discussed with Interventional Radiology at [facility].  Monitor the trend in hemoglobin and hemodynamic status.  If she remains stable, continue monitoring.  If she has a significant drop in hemoglobin or show signs of hemodynamic instability, she may need CTA of the abdomen and pelvis (bleed protocol) to determine if IR intervention is indicated. Pt. Transferred toHospital Medicine at [facility] in step-down status for EEG and Neurology evaluation of new onset seizures as well as monitoring the trend in her hemoglobin.     August 5:  Sodium 135, potassium 3.2, chloride 104, CO2 23, BUN 12, creatinine 1.7, glucose 84, calcium 8.4, magnesium 1.4, phosphorus 3.2, AST 20, ALT, white blood cells 7.13, hemoglobin 9.3, hematocrit 27.9, platelets 242  -repeat CBC on the afternoon of August 5 had hemoglobin 10.7 and hematocrit 32.7 (improved from earlier).  -CT abdomen and pelvis without contrast noted small hypoattenuating nodule in the right hepatic lobe may represent a cyst.  Diverticulosis.  Recent right total left hip arthroplasty with poorly defined subcutaneous fluid collection overlying the left hip which may represent resolving hematoma or postsurgical seroma.  Postsurgical abscess not excludable.  Findings suspicious for intramuscular hematoma of the left iliopsoas, psoas, and iliacus muscles with potential active hemorrhage.  -MRI brain was motion limited.  No acute  intracranial abnormality.  Cortical atrophy with periventricular deep white matter change consistent with chronic small-vessel ischemic disease.     August 4: Blood cultures with no growth to date, INR 1.8, iron saturation 19%, stool for occult blood negative, lactic acid 0.7, hemoglobin 6.7, hematocrit 20.5, procalcitonin 0.26  -urinalysis with 2+ protein, trace blood, trace leukocytes, 4 RBC, 8 WBC, many bacteria  -chest x-ray had no acute cardiopulmonary process identified  -CT head had no hemorrhage or other acute intracranial CT findings.  -CT left hip showed nonspecific soft tissue fullness to lateral hip measuring 9.4 x 8.7 x 4.8 cm.  Partially imaged slightly hyperattenuating fluid in the left deep pelvis and presacral region which may represent blood products.  Postsurgical changes of left hip arthroplasty.  -EKG showed normal sinus rhythm and appeared to be fairly normal.    Overview/Hospital Course:  Patient maintained stable hemoglobin since admission to [facility].  Working well with speech therapy, tolerating bite size soft diet.  TSH noted to be markedly elevated up to 19, started on Synthroid.  IR unable to aspirate any fluid from left hip.    Interval History:  No reports of any chest pain or shortness a breath.  Afebrile.    Review of Systems  Objective:     Vital Signs (Most Recent):  Temp: 97.7 °F (36.5 °C) (08/16/24 1524)  Pulse: 66 (08/16/24 1524)  Resp: 18 (08/16/24 1524)  BP: 139/61 (08/16/24 1524)  SpO2: 96 % (08/16/24 1524) Vital Signs (24h Range):  Temp:  [97.5 °F (36.4 °C)-98 °F (36.7 °C)] 97.7 °F (36.5 °C)  Pulse:  [61-76] 66  Resp:  [16-18] 18  SpO2:  [90 %-99 %] 96 %  BP: (123-183)/(58-80) 139/61     Weight: 82.5 kg (181 lb 14.1 oz)  Body mass index is 28.49 kg/m².  No intake or output data in the 24 hours ending 08/16/24 1555      Physical Exam      Clear lungs bilaterally, unlabored breathing, on room air, no cyanosis   No facial droop, no slurred speech   Heart sounds indicate a  regular rate and rhythm   Awake alert, no acute distress   Oriented x3   No obvious upper nor lower extremity edema   5/5 proximal upper and lower extremity strength bilaterally including fist  and hip flexor strength       Assessment/Plan:      * Seizure-like activity  -Reported seizure at [facility] prior to transfer pt. Was post-ictal and remains confused  -MRI brain uremarkable  -EEG so far unremarkable for any seizure activity   -currently on Keppra 500, follow up Neurology recommendations   -seizure precautions, fall precautions   -neuro checks      Encephalopathy, metabolic  -Pt. Presented w/ intermittent confusion, seizure activity. M-do not suspect true UTI as cause given very low colony count  -MRI brain negative for acute findings.   -TSH markedly elevated, see below for further treatment, could have been easily contributing factor  -markedly improved now    Hip hematoma, left  -Pt. Presented anemia, required 1 unit pRBC, Hgb now stable ~10. CT abdomen conerning for subcutaneous fluid collection overlying the left hip may represent resolving hematoma or postsurgical seroma andFindings suspicious for an intramuscular hematoma of the left iliopsoas, psoas and iliacus muscles with potential active hemorrhage.  -conservative management per Orthopedics Interventional Radiology at this time   -stable hemoglobin   -no successful aspiration from left hip via IR procedure on 08/14  -bone scan shows postoperative changes of left hip arthroplasty pitted diffuse increase in the soft tissue and delayed uptake around the arthroplasty due to postoperative changes and inflammatory process.   -on merrem per ID          Hypertension  Pressures noted to be in the 180s systolic   Continue home Toprol   Follow up with pharmacy and family regarding home medication list    Paroxysmal atrial fibrillation  -Hx of pAfib. EKG on admit showed NSR, rhythm currently regular  -Continue home metoprolol and monitor on  telemetry.  Holding Xarelto due to bleeding concerns-this was affirmed by the daughter via phone    Delirium  Intermittent.       Antibiotic long-term use  Currently on Meropenem per ID recs.       Chronic anticoagulation  On xarelto at home      Dementia  Neuro checks   Fall precautions   Seizure precautions  Memantine       Hypothyroidism  Elevated TSH on presentation near 19  Started on Synthroid 88 mcg         VTE Risk Mitigation (From admission, onward)           Ordered     Place sequential compression device  Until discontinued         08/06/24 1811                    Discharge Planning   KATI: 8/19/2024     Code Status: DNR   Is the patient medically ready for discharge?:     Reason for patient still in hospital (select all that apply): Patient trending condition and Consult recommendations  Discharge Plan A: Skilled Nursing Facility   Discharge Delays: (!) Change in Medical Condition              Jame Tiwari MD  Department of Hospital Medicine   [facility] - Neurosurgery ([facility])

## 2024-08-17 LAB
ALBUMIN SERPL BCP-MCNC: 2.1 G/DL (ref 3.5–5.2)
ALP SERPL-CCNC: 150 U/L (ref 55–135)
ALT SERPL W/O P-5'-P-CCNC: 11 U/L (ref 10–44)
ANION GAP SERPL CALC-SCNC: 2 MMOL/L (ref 8–16)
AST SERPL-CCNC: 28 U/L (ref 10–40)
BASOPHILS # BLD AUTO: 0.05 K/UL (ref 0–0.2)
BASOPHILS NFR BLD: 0.6 % (ref 0–1.9)
BILIRUB SERPL-MCNC: 0.5 MG/DL (ref 0.1–1)
BUN SERPL-MCNC: 22 MG/DL (ref 8–23)
CALCIUM SERPL-MCNC: 8.7 MG/DL (ref 8.7–10.5)
CHLORIDE SERPL-SCNC: 103 MMOL/L (ref 95–110)
CO2 SERPL-SCNC: 28 MMOL/L (ref 23–29)
CREAT SERPL-MCNC: 1.2 MG/DL (ref 0.5–1.4)
DIFFERENTIAL METHOD BLD: ABNORMAL
EOSINOPHIL # BLD AUTO: 0.9 K/UL (ref 0–0.5)
EOSINOPHIL NFR BLD: 11.6 % (ref 0–8)
ERYTHROCYTE [DISTWIDTH] IN BLOOD BY AUTOMATED COUNT: 20.4 % (ref 11.5–14.5)
EST. GFR  (NO RACE VARIABLE): 44.9 ML/MIN/1.73 M^2
GLUCOSE SERPL-MCNC: 97 MG/DL (ref 70–110)
HCT VFR BLD AUTO: 26 % (ref 37–48.5)
HGB BLD-MCNC: 8.3 G/DL (ref 12–16)
IMM GRANULOCYTES # BLD AUTO: 0.16 K/UL (ref 0–0.04)
IMM GRANULOCYTES NFR BLD AUTO: 2 % (ref 0–0.5)
LYMPHOCYTES # BLD AUTO: 1.6 K/UL (ref 1–4.8)
LYMPHOCYTES NFR BLD: 20.2 % (ref 18–48)
MCH RBC QN AUTO: 31.1 PG (ref 27–31)
MCHC RBC AUTO-ENTMCNC: 31.9 G/DL (ref 32–36)
MCV RBC AUTO: 97 FL (ref 82–98)
MONOCYTES # BLD AUTO: 1 K/UL (ref 0.3–1)
MONOCYTES NFR BLD: 12.5 % (ref 4–15)
NEUTROPHILS # BLD AUTO: 4.2 K/UL (ref 1.8–7.7)
NEUTROPHILS NFR BLD: 53.1 % (ref 38–73)
NRBC BLD-RTO: 0 /100 WBC
PLATELET # BLD AUTO: 180 K/UL (ref 150–450)
PMV BLD AUTO: 10.5 FL (ref 9.2–12.9)
POCT GLUCOSE: 110 MG/DL (ref 70–110)
POCT GLUCOSE: 89 MG/DL (ref 70–110)
POCT GLUCOSE: 91 MG/DL (ref 70–110)
POCT GLUCOSE: 94 MG/DL (ref 70–110)
POTASSIUM SERPL-SCNC: 3.9 MMOL/L (ref 3.5–5.1)
PROT SERPL-MCNC: 7.9 G/DL (ref 6–8.4)
RBC # BLD AUTO: 2.67 M/UL (ref 4–5.4)
SODIUM SERPL-SCNC: 133 MMOL/L (ref 136–145)
WBC # BLD AUTO: 7.87 K/UL (ref 3.9–12.7)

## 2024-08-17 PROCEDURE — 25000003 PHARM REV CODE 250: Performed by: HOSPITALIST

## 2024-08-17 PROCEDURE — 25000003 PHARM REV CODE 250: Performed by: STUDENT IN AN ORGANIZED HEALTH CARE EDUCATION/TRAINING PROGRAM

## 2024-08-17 PROCEDURE — 36415 COLL VENOUS BLD VENIPUNCTURE: CPT | Performed by: HOSPITALIST

## 2024-08-17 PROCEDURE — 11000001 HC ACUTE MED/SURG PRIVATE ROOM

## 2024-08-17 PROCEDURE — 63600175 PHARM REV CODE 636 W HCPCS: Performed by: STUDENT IN AN ORGANIZED HEALTH CARE EDUCATION/TRAINING PROGRAM

## 2024-08-17 PROCEDURE — 27000207 HC ISOLATION

## 2024-08-17 PROCEDURE — 85025 COMPLETE CBC W/AUTO DIFF WBC: CPT | Performed by: HOSPITALIST

## 2024-08-17 PROCEDURE — 80053 COMPREHEN METABOLIC PANEL: CPT | Performed by: HOSPITALIST

## 2024-08-17 RX ADMIN — ATORVASTATIN CALCIUM 40 MG: 40 TABLET, FILM COATED ORAL at 09:08

## 2024-08-17 RX ADMIN — MEROPENEM 2 G: 2 INJECTION, POWDER, FOR SOLUTION INTRAVENOUS at 12:08

## 2024-08-17 RX ADMIN — DOCUSATE SODIUM 100 MG: 100 CAPSULE, LIQUID FILLED ORAL at 09:08

## 2024-08-17 RX ADMIN — MEMANTINE 5 MG: 5 TABLET ORAL at 09:08

## 2024-08-17 RX ADMIN — CARVEDILOL 12.5 MG: 12.5 TABLET, FILM COATED ORAL at 09:08

## 2024-08-17 RX ADMIN — LEVETIRACETAM 500 MG: 500 TABLET, FILM COATED ORAL at 09:08

## 2024-08-17 RX ADMIN — FERROUS SULFATE TAB EC 325 MG (65 MG FE EQUIVALENT) 1 EACH: 325 (65 FE) TABLET DELAYED RESPONSE at 09:08

## 2024-08-17 RX ADMIN — LEVOTHYROXINE SODIUM 25 MCG: 25 TABLET ORAL at 06:08

## 2024-08-17 RX ADMIN — ACETAMINOPHEN 650 MG: 325 TABLET ORAL at 09:08

## 2024-08-17 RX ADMIN — MONTELUKAST 10 MG: 10 TABLET, FILM COATED ORAL at 09:08

## 2024-08-17 NOTE — PLAN OF CARE
POC updated and reviewed with the patient and family at the bedside. Questions regarding POC were encouraged and addressed. VSS, see flowsheets. Patient is AOX 4 at this time. Fall and safety precautions maintained, no signs of injury noted during shift. Patient repositioned independently and with assistance in bed for comfort. Upon exiting room, patient's bed locked in low position, side rails up x 2, bed alarm on, with call light within reach. Instructed patient to call staff for mobility, verbalized understanding. No acute signs of distress noted at this time.     -Cherri Hines     Problem: Adult Inpatient Plan of Care  Goal: Plan of Care Review  Outcome: Progressing  Goal: Absence of Hospital-Acquired Illness or Injury  Outcome: Progressing  Goal: Optimal Comfort and Wellbeing  Outcome: Progressing  Goal: Readiness for Transition of Care  Outcome: Progressing     Problem: Infection  Goal: Absence of Infection Signs and Symptoms  Outcome: Progressing     Problem: Skin Injury Risk Increased  Goal: Skin Health and Integrity  Outcome: Progressing     Problem: Acute Kidney Injury/Impairment  Goal: Improved Oral Intake  Outcome: Progressing     Problem: Wound  Goal: Optimal Functional Ability  Outcome: Progressing  Goal: Skin Health and Integrity  Outcome: Progressing

## 2024-08-17 NOTE — ASSESSMENT & PLAN NOTE
-Pt. Presented anemia, required 1 unit pRBC, Hgb now stable ~10. CT abdomen conerning for subcutaneous fluid collection overlying the left hip may represent resolving hematoma or postsurgical seroma andFindings suspicious for an intramuscular hematoma of the left iliopsoas, psoas and iliacus muscles with potential active hemorrhage.  -conservative management per Orthopedics Interventional Radiology at this time   -stable hemoglobin   -no successful aspiration from left hip via IR procedure on 08/14  -bone scan shows postoperative changes of left hip arthroplasty pitted diffuse increase in the soft tissue and delayed uptake around the arthroplasty due to postoperative changes and inflammatory process.   -on merrem per ID; with reducing CRP ID concerned about possible acute osteo

## 2024-08-17 NOTE — ASSESSMENT & PLAN NOTE
-Hx of pAfib. EKG on admit showed NSR, rhythm currently regular  -Continue home metoprolol and monitor on telemetry.  Holding Xarelto due to bleeding concerns-this was affirmed by the daughter via phone  -s/p ablation per sister; see's outside cardiologist

## 2024-08-17 NOTE — PROGRESS NOTES
Yossi Vargas - Neurosurgery ([facility])  [facility] Medicine  Progress Note    Patient Name: Shaye Clemente  MRN: [id number]  Patient Class: IP- Inpatient   Admission Date: 8/6/2024  Length of Stay: 11 days  Attending Physician: Jame Tiwari MD  Primary Care Provider: Skip Singh MD        Subjective:     Principal Problem:Seizure-like activity        HPI:  84-year-old female with a left hip hemiarthroplasty in June (by report had hemiarthroplasty of closed comminuted femoral neck hip fracture), paroxysmal atrial fibrillation (on Xarelto), hypothyroidism, dementia, and hyperlipidemia admitted to [facility] on August 4 with low hemoglobin.  Patient reported tarry stools for several days, but Hemoccult was negative in the emergency department.  She also noted discomfort and swelling around her left hip.  Labs in the emergency department were concerning for anemia with hemoglobin 6.5.  There was also concern for possible cystitis, and she was placed on ciprofloxacin.  CT of the head had no acute abnormalities, and CT of the left hip showed soft tissue fullness and fluid in the deep left pelvis and presacral region.  Initial EKG showed sinus rhythm.  She was transfused 2 units packed red blood cells and admitted to Hospital Medicine.  With transfusion, hemoglobin increased to 9.3.  She was confused during her stay.  She was taken for CT of the abdomen and pelvis on the morning of August 5 when she began to have seizure activity.  She had fluttering of her eyes and movement of her head along with dorsiflexion of both feet.  She was given Keppra and placed on twice daily Keppra.  MRI brain had no acute intracranial abnormality.     She underwent Neurology tele-consultation, and recommendations included additional Keppra along with Q 4 hour neuro checks and routine EEG.  She was lethargic and required repeated stimulation for her to participate during examination.  She was oriented to person and time.  She was  able to follow simple commands.  Mentation appears to be slowly improving.      She underwent noncontrasted CT of the abdomen and pelvis that showed a subcutaneous fluid collection overlying the left hip which may represent resolving hematoma or postsurgical seroma.  There were also findings suspicious for intramuscular hematoma in the left iliopsoas, psoas, and iliacus.  Transfer center spoke with Orthopedic Surgery at [facility].  At present, there does not appear to be surgical intervention indicated in the left hip.  Case also discussed with Interventional Radiology at [facility].  Monitor the trend in hemoglobin and hemodynamic status.  If she remains stable, continue monitoring.  If she has a significant drop in hemoglobin or show signs of hemodynamic instability, she may need CTA of the abdomen and pelvis (bleed protocol) to determine if IR intervention is indicated. Pt. Transferred toHospital Medicine at [facility] in step-down status for EEG and Neurology evaluation of new onset seizures as well as monitoring the trend in her hemoglobin.     August 5:  Sodium 135, potassium 3.2, chloride 104, CO2 23, BUN 12, creatinine 1.7, glucose 84, calcium 8.4, magnesium 1.4, phosphorus 3.2, AST 20, ALT, white blood cells 7.13, hemoglobin 9.3, hematocrit 27.9, platelets 242  -repeat CBC on the afternoon of August 5 had hemoglobin 10.7 and hematocrit 32.7 (improved from earlier).  -CT abdomen and pelvis without contrast noted small hypoattenuating nodule in the right hepatic lobe may represent a cyst.  Diverticulosis.  Recent right total left hip arthroplasty with poorly defined subcutaneous fluid collection overlying the left hip which may represent resolving hematoma or postsurgical seroma.  Postsurgical abscess not excludable.  Findings suspicious for intramuscular hematoma of the left iliopsoas, psoas, and iliacus muscles with potential active hemorrhage.  -MRI brain was motion limited.  No acute  intracranial abnormality.  Cortical atrophy with periventricular deep white matter change consistent with chronic small-vessel ischemic disease.     August 4: Blood cultures with no growth to date, INR 1.8, iron saturation 19%, stool for occult blood negative, lactic acid 0.7, hemoglobin 6.7, hematocrit 20.5, procalcitonin 0.26  -urinalysis with 2+ protein, trace blood, trace leukocytes, 4 RBC, 8 WBC, many bacteria  -chest x-ray had no acute cardiopulmonary process identified  -CT head had no hemorrhage or other acute intracranial CT findings.  -CT left hip showed nonspecific soft tissue fullness to lateral hip measuring 9.4 x 8.7 x 4.8 cm.  Partially imaged slightly hyperattenuating fluid in the left deep pelvis and presacral region which may represent blood products.  Postsurgical changes of left hip arthroplasty.  -EKG showed normal sinus rhythm and appeared to be fairly normal.    Overview/Hospital Course:  Patient maintained stable hemoglobin since admission to [facility].  Working well with speech therapy, tolerating bite size soft diet.  TSH noted to be markedly elevated up to 19, started on Synthroid.  IR unable to aspirate any fluid from left hip. Pt's CRP had markedly reduced while on carbapenem; ID felt acute osteomyelitis was possibly present.    Interval History:  No complaints of pain today from the patient.  Wanting to be discharged home.  Afebrile.  No reports of any chest pain shortness a breath.  Family at the bedside.    Review of Systems  Objective:     Vital Signs (Most Recent):  Temp: 98.3 °F (36.8 °C) (08/17/24 1153)  Pulse: 68 (08/17/24 1153)  Resp: 18 (08/17/24 1153)  BP: (!) 144/67 (08/17/24 1153)  SpO2: (!) 93 % (08/17/24 1153) Vital Signs (24h Range):  Temp:  [97.5 °F (36.4 °C)-98.3 °F (36.8 °C)] 98.3 °F (36.8 °C)  Pulse:  [65-68] 68  Resp:  [15-18] 18  SpO2:  [93 %-100 %] 93 %  BP: (133-166)/(61-75) 144/67     Weight: 81.2 kg (179 lb 0.2 oz)  Body mass index is 28.04  kg/m².    Intake/Output Summary (Last 24 hours) at 8/17/2024 1440  Last data filed at 8/17/2024 0424  Gross per 24 hour   Intake --   Output 400 ml   Net -400 ml         Physical Exam      Clear lungs bilaterally, unlabored breathing, on room air, no cyanosis   Heart sounds indicate a regular rate and rhythm   Awake alert, no acute distress   No facial droop, no slurred speech   5/5 proximal upper and lower extremity strength bilaterally including fist  and hip flexor strength   No obvious bruising noted over either lower extremity   Oriented x 3    Assessment/Plan:      * Seizure-like activity  -Reported seizure at [facility] prior to transfer pt.   -mentation back at baseline  -MRI brain uremarkable  -EEG so far unremarkable for any seizure activity   - Keppra 500 BID  -seizure precautions, fall precautions   -neuro checks      Hip hematoma, left  -Pt. Presented anemia, required 1 unit pRBC, Hgb now stable ~10. CT abdomen conerning for subcutaneous fluid collection overlying the left hip may represent resolving hematoma or postsurgical seroma andFindings suspicious for an intramuscular hematoma of the left iliopsoas, psoas and iliacus muscles with potential active hemorrhage.  -conservative management per Orthopedics Interventional Radiology at this time   -stable hemoglobin   -no successful aspiration from left hip via IR procedure on 08/14  -bone scan shows postoperative changes of left hip arthroplasty pitted diffuse increase in the soft tissue and delayed uptake around the arthroplasty due to postoperative changes and inflammatory process.   -on merrem per ID; with reducing CRP ID concerned about possible acute osteo          Hypertension  Pressures noted to be in the 180s systolic   Continue home Toprol   Follow up with pharmacy and family regarding home medication list    Paroxysmal atrial fibrillation  -Hx of pAfib. EKG on admit showed NSR, rhythm currently regular  -Continue home metoprolol and  monitor on telemetry.  Holding Xarelto due to bleeding concerns-this was affirmed by the daughter via phone  -s/p ablation per sister; see's outside cardiologist    Delirium  Intermittent.       Antibiotic long-term use  Currently on Meropenem per ID recs.       Chronic anticoagulation  On xarelto at home      Dementia  Neuro checks   Fall precautions   Seizure precautions  Memantine       Hypothyroidism  Elevated TSH on presentation near 19  Started on Synthroid 88 mcg         VTE Risk Mitigation (From admission, onward)           Ordered     Place sequential compression device  Until discontinued         08/06/24 1811                    Discharge Planning   KATI: 8/19/2024     Code Status: DNR   Is the patient medically ready for discharge?:     Reason for patient still in hospital (select all that apply): Consult recommendations and Pending disposition  Discharge Plan A: Skilled Nursing Facility   Discharge Delays: (!) Change in Medical Condition              Jame Tiwari MD  Department of Hospital Medicine   [facility] - Neurosurgery ([facility])

## 2024-08-17 NOTE — ASSESSMENT & PLAN NOTE
-Reported seizure at [facility] prior to transfer pt.   -mentation back at baseline  -MRI brain uremarkable  -EEG so far unremarkable for any seizure activity   - Keppra 500 BID  -seizure precautions, fall precautions   -neuro checks

## 2024-08-17 NOTE — SUBJECTIVE & OBJECTIVE
Interval History:  No complaints of pain today from the patient.  Wanting to be discharged home.  Afebrile.  No reports of any chest pain shortness a breath.  Family at the bedside.    Review of Systems  Objective:     Vital Signs (Most Recent):  Temp: 98.3 °F (36.8 °C) (08/17/24 1153)  Pulse: 68 (08/17/24 1153)  Resp: 18 (08/17/24 1153)  BP: (!) 144/67 (08/17/24 1153)  SpO2: (!) 93 % (08/17/24 1153) Vital Signs (24h Range):  Temp:  [97.5 °F (36.4 °C)-98.3 °F (36.8 °C)] 98.3 °F (36.8 °C)  Pulse:  [65-68] 68  Resp:  [15-18] 18  SpO2:  [93 %-100 %] 93 %  BP: (133-166)/(61-75) 144/67     Weight: 81.2 kg (179 lb 0.2 oz)  Body mass index is 28.04 kg/m².    Intake/Output Summary (Last 24 hours) at 8/17/2024 1440  Last data filed at 8/17/2024 0424  Gross per 24 hour   Intake --   Output 400 ml   Net -400 ml         Physical Exam      Clear lungs bilaterally, unlabored breathing, on room air, no cyanosis   Heart sounds indicate a regular rate and rhythm   Awake alert, no acute distress   No facial droop, no slurred speech   5/5 proximal upper and lower extremity strength bilaterally including fist  and hip flexor strength   No obvious bruising noted over either lower extremity   Oriented x 3

## 2024-08-17 NOTE — PLAN OF CARE
Chart review:      - Afebrile and white blood cell count is normal.    - Remains on meropenem.  - Bone scan:There are scintigraphic findings to suggest postoperative changes of left hip arthroplasty pitted diffuse increase in the soft tissue and delayed uptake around the arthroplasty due to postoperative changes and inflammatory process.  If infection is strongly suspected, a follow-up indium labeled WBC study is recommended   - CRP decreased to 64 from a peak of 287    Assessment/plan:  1. Difficult to determine if bone infection from bone scan but given that patient has been treated with meropenem and CRP has progressively decreased which would be expected with infection treatment implying that is the case  2. Recommend PET/CT scan to better assess for bone infection.  If they can not be obtained as inpatient then would recommend empiric treatment of osteomyelitis with meropenem and PICC placement..  3. We will follow up over weekend.

## 2024-08-18 LAB
ALBUMIN SERPL BCP-MCNC: 2.1 G/DL (ref 3.5–5.2)
ALP SERPL-CCNC: 134 U/L (ref 55–135)
ALT SERPL W/O P-5'-P-CCNC: 10 U/L (ref 10–44)
ANION GAP SERPL CALC-SCNC: 1 MMOL/L (ref 8–16)
AST SERPL-CCNC: 29 U/L (ref 10–40)
BASOPHILS # BLD AUTO: 0.05 K/UL (ref 0–0.2)
BASOPHILS NFR BLD: 0.7 % (ref 0–1.9)
BILIRUB SERPL-MCNC: 0.5 MG/DL (ref 0.1–1)
BUN SERPL-MCNC: 18 MG/DL (ref 8–23)
CALCIUM SERPL-MCNC: 8.6 MG/DL (ref 8.7–10.5)
CHLORIDE SERPL-SCNC: 103 MMOL/L (ref 95–110)
CO2 SERPL-SCNC: 29 MMOL/L (ref 23–29)
CREAT SERPL-MCNC: 1.2 MG/DL (ref 0.5–1.4)
CRP SERPL-MCNC: 41.5 MG/L (ref 0–8.2)
DIFFERENTIAL METHOD BLD: ABNORMAL
EOSINOPHIL # BLD AUTO: 1 K/UL (ref 0–0.5)
EOSINOPHIL NFR BLD: 14.2 % (ref 0–8)
ERYTHROCYTE [DISTWIDTH] IN BLOOD BY AUTOMATED COUNT: 20.3 % (ref 11.5–14.5)
EST. GFR  (NO RACE VARIABLE): 44.9 ML/MIN/1.73 M^2
GLUCOSE SERPL-MCNC: 94 MG/DL (ref 70–110)
HCT VFR BLD AUTO: 25.9 % (ref 37–48.5)
HGB BLD-MCNC: 8.7 G/DL (ref 12–16)
IMM GRANULOCYTES # BLD AUTO: 0.13 K/UL (ref 0–0.04)
IMM GRANULOCYTES NFR BLD AUTO: 1.8 % (ref 0–0.5)
LYMPHOCYTES # BLD AUTO: 1.4 K/UL (ref 1–4.8)
LYMPHOCYTES NFR BLD: 19.8 % (ref 18–48)
MCH RBC QN AUTO: 31.9 PG (ref 27–31)
MCHC RBC AUTO-ENTMCNC: 33.6 G/DL (ref 32–36)
MCV RBC AUTO: 95 FL (ref 82–98)
MONOCYTES # BLD AUTO: 0.9 K/UL (ref 0.3–1)
MONOCYTES NFR BLD: 12.3 % (ref 4–15)
NEUTROPHILS # BLD AUTO: 3.7 K/UL (ref 1.8–7.7)
NEUTROPHILS NFR BLD: 51.2 % (ref 38–73)
NRBC BLD-RTO: 0 /100 WBC
PLATELET # BLD AUTO: 182 K/UL (ref 150–450)
PMV BLD AUTO: 10.4 FL (ref 9.2–12.9)
POCT GLUCOSE: 95 MG/DL (ref 70–110)
POCT GLUCOSE: 96 MG/DL (ref 70–110)
POCT GLUCOSE: 99 MG/DL (ref 70–110)
POTASSIUM SERPL-SCNC: 3.6 MMOL/L (ref 3.5–5.1)
PROT SERPL-MCNC: 7.7 G/DL (ref 6–8.4)
RBC # BLD AUTO: 2.73 M/UL (ref 4–5.4)
SODIUM SERPL-SCNC: 133 MMOL/L (ref 136–145)
WBC # BLD AUTO: 7.26 K/UL (ref 3.9–12.7)

## 2024-08-18 PROCEDURE — 25000003 PHARM REV CODE 250: Performed by: STUDENT IN AN ORGANIZED HEALTH CARE EDUCATION/TRAINING PROGRAM

## 2024-08-18 PROCEDURE — 27000207 HC ISOLATION

## 2024-08-18 PROCEDURE — 36415 COLL VENOUS BLD VENIPUNCTURE: CPT | Performed by: HOSPITALIST

## 2024-08-18 PROCEDURE — 86140 C-REACTIVE PROTEIN: CPT | Performed by: HOSPITALIST

## 2024-08-18 PROCEDURE — 63600175 PHARM REV CODE 636 W HCPCS: Performed by: STUDENT IN AN ORGANIZED HEALTH CARE EDUCATION/TRAINING PROGRAM

## 2024-08-18 PROCEDURE — 80053 COMPREHEN METABOLIC PANEL: CPT | Performed by: HOSPITALIST

## 2024-08-18 PROCEDURE — 99900035 HC TECH TIME PER 15 MIN (STAT)

## 2024-08-18 PROCEDURE — 11000001 HC ACUTE MED/SURG PRIVATE ROOM

## 2024-08-18 PROCEDURE — 25000003 PHARM REV CODE 250: Performed by: HOSPITALIST

## 2024-08-18 PROCEDURE — 94761 N-INVAS EAR/PLS OXIMETRY MLT: CPT

## 2024-08-18 PROCEDURE — 85025 COMPLETE CBC W/AUTO DIFF WBC: CPT | Performed by: HOSPITALIST

## 2024-08-18 RX ADMIN — LEVETIRACETAM 500 MG: 500 TABLET, FILM COATED ORAL at 08:08

## 2024-08-18 RX ADMIN — DOCUSATE SODIUM 100 MG: 100 CAPSULE, LIQUID FILLED ORAL at 08:08

## 2024-08-18 RX ADMIN — MEMANTINE 5 MG: 5 TABLET ORAL at 08:08

## 2024-08-18 RX ADMIN — CARVEDILOL 12.5 MG: 12.5 TABLET, FILM COATED ORAL at 08:08

## 2024-08-18 RX ADMIN — MEROPENEM 2 G: 2 INJECTION, POWDER, FOR SOLUTION INTRAVENOUS at 11:08

## 2024-08-18 RX ADMIN — ATORVASTATIN CALCIUM 40 MG: 40 TABLET, FILM COATED ORAL at 08:08

## 2024-08-18 RX ADMIN — MONTELUKAST 10 MG: 10 TABLET, FILM COATED ORAL at 08:08

## 2024-08-18 RX ADMIN — MEROPENEM 2 G: 2 INJECTION, POWDER, FOR SOLUTION INTRAVENOUS at 12:08

## 2024-08-18 RX ADMIN — FERROUS SULFATE TAB EC 325 MG (65 MG FE EQUIVALENT) 1 EACH: 325 (65 FE) TABLET DELAYED RESPONSE at 08:08

## 2024-08-18 RX ADMIN — LEVOTHYROXINE SODIUM 25 MCG: 25 TABLET ORAL at 07:08

## 2024-08-18 RX ADMIN — ACETAMINOPHEN 650 MG: 325 TABLET ORAL at 10:08

## 2024-08-18 RX ADMIN — ACETAMINOPHEN 650 MG: 325 TABLET ORAL at 08:08

## 2024-08-18 NOTE — PROGRESS NOTES
Yossi Vargas - Neurosurgery ([facility]  [facility] Medicine  Progress Note    Patient Name: Shaye Clemente  MRN: [id number]  Patient Class: IP- Inpatient   Admission Date: 8/6/2024  Length of Stay: 12 days  Attending Physician: Jame Tiwari MD  Primary Care Provider: Skip Singh MD        Subjective:     Principal Problem:Seizure-like activity        HPI:  84-year-old female with a left hip hemiarthroplasty in June (by report had hemiarthroplasty of closed comminuted femoral neck hip fracture), paroxysmal atrial fibrillation (on Xarelto), hypothyroidism, dementia, and hyperlipidemia admitted to [facility] on August 4 with low hemoglobin.  Patient reported tarry stools for several days, but Hemoccult was negative in the emergency department.  She also noted discomfort and swelling around her left hip.  Labs in the emergency department were concerning for anemia with hemoglobin 6.5.  There was also concern for possible cystitis, and she was placed on ciprofloxacin.  CT of the head had no acute abnormalities, and CT of the left hip showed soft tissue fullness and fluid in the deep left pelvis and presacral region.  Initial EKG showed sinus rhythm.  She was transfused 2 units packed red blood cells and admitted to Hospital Medicine.  With transfusion, hemoglobin increased to 9.3.  She was confused during her stay.  She was taken for CT of the abdomen and pelvis on the morning of August 5 when she began to have seizure activity.  She had fluttering of her eyes and movement of her head along with dorsiflexion of both feet.  She was given Keppra and placed on twice daily Keppra.  MRI brain had no acute intracranial abnormality.     She underwent Neurology tele-consultation, and recommendations included additional Keppra along with Q 4 hour neuro checks and routine EEG.  She was lethargic and required repeated stimulation for her to participate during examination.  She was oriented to person and time.  She was  able to follow simple commands.  Mentation appears to be slowly improving.      She underwent noncontrasted CT of the abdomen and pelvis that showed a subcutaneous fluid collection overlying the left hip which may represent resolving hematoma or postsurgical seroma.  There were also findings suspicious for intramuscular hematoma in the left iliopsoas, psoas, and iliacus.  Transfer center spoke with Orthopedic Surgery at [facility].  At present, there does not appear to be surgical intervention indicated in the left hip.  Case also discussed with Interventional Radiology at [facility].  Monitor the trend in hemoglobin and hemodynamic status.  If she remains stable, continue monitoring.  If she has a significant drop in hemoglobin or show signs of hemodynamic instability, she may need CTA of the abdomen and pelvis (bleed protocol) to determine if IR intervention is indicated. Pt. Transferred to[facility]ospital Medicine at [facility] in step-down status for EEG and Neurology evaluation of new onset seizures as well as monitoring the trend in her hemoglobin.     August 5:  Sodium 135, potassium 3.2, chloride 104, CO2 23, BUN 12, creatinine 1.7, glucose 84, calcium 8.4, magnesium 1.4, phosphorus 3.2, AST 20, ALT, white blood cells 7.13, hemoglobin 9.3, hematocrit 27.9, platelets 242  -repeat CBC on the afternoon of August 5 had hemoglobin 10.7 and hematocrit 32.7 (improved from earlier).  -CT abdomen and pelvis without contrast noted small hypoattenuating nodule in the right hepatic lobe may represent a cyst.  Diverticulosis.  Recent right total left hip arthroplasty with poorly defined subcutaneous fluid collection overlying the left hip which may represent resolving hematoma or postsurgical seroma.  Postsurgical abscess not excludable.  Findings suspicious for intramuscular hematoma of the left iliopsoas, psoas, and iliacus muscles with potential active hemorrhage.  -MRI brain was motion limited.  No acute  intracranial abnormality.  Cortical atrophy with periventricular deep white matter change consistent with chronic small-vessel ischemic disease.     August 4: Blood cultures with no growth to date, INR 1.8, iron saturation 19%, stool for occult blood negative, lactic acid 0.7, hemoglobin 6.7, hematocrit 20.5, procalcitonin 0.26  -urinalysis with 2+ protein, trace blood, trace leukocytes, 4 RBC, 8 WBC, many bacteria  -chest x-ray had no acute cardiopulmonary process identified  -CT head had no hemorrhage or other acute intracranial CT findings.  -CT left hip showed nonspecific soft tissue fullness to lateral hip measuring 9.4 x 8.7 x 4.8 cm.  Partially imaged slightly hyperattenuating fluid in the left deep pelvis and presacral region which may represent blood products.  Postsurgical changes of left hip arthroplasty.  -EKG showed normal sinus rhythm and appeared to be fairly normal.    Overview/Hospital Course:  Patient maintained stable hemoglobin since admission to [facility].  Working well with speech therapy, tolerating bite size soft diet.  TSH noted to be markedly elevated up to 19, started on Synthroid.  IR unable to aspirate any fluid from left hip. Pt's CRP had markedly reduced while on carbapenem; ID felt acute osteomyelitis was possibly present.    Interval History:  no CP; no HA. No leg pains. Afebrile. Pt wanting to be discharged home    Review of Systems  Objective:     Vital Signs (Most Recent):  Temp: 97.4 °F (36.3 °C) (08/18/24 0756)  Pulse: (!) 58 (08/18/24 0756)  Resp: 16 (08/18/24 0756)  BP: (!) 145/72 (08/18/24 0850)  SpO2: 95 % (08/18/24 0756) Vital Signs (24h Range):  Temp:  [97.4 °F (36.3 °C)-98.3 °F (36.8 °C)] 97.4 °F (36.3 °C)  Pulse:  [55-68] 58  Resp:  [16-18] 16  SpO2:  [93 %-97 %] 95 %  BP: (132-155)/(60-72) 145/72     Weight: 81.2 kg (179 lb 0.2 oz)  Body mass index is 28.04 kg/m².    Intake/Output Summary (Last 24 hours) at 8/18/2024 1116  Last data filed at 8/18/2024 0713  Gross per  24 hour   Intake --   Output 1400 ml   Net -1400 ml         Physical Exam      Clear lungs bilaterally, unlabored breathing, on room air, no cyanosis   Heart sounds indicate a regular rate and rhythm   Awake alert, no acute distress   No facial droop, no slurred speech   5/5 proximal upper and lower extremity strength bilaterally including fist  and hip flexor strength   No obvious upper nor lower extremity edema   Appropriate mood and affect       Assessment/Plan:      * Seizure-like activity  -Reported seizure at [facility] prior to transfer pt.   -mentation back at baseline  -MRI brain uremarkable  -EEG so far unremarkable for any seizure activity   - Keppra 500 BID  -seizure precautions, fall precautions   -neuro checks      Hip hematoma, left  -Pt. Presented anemia, required 1 unit pRBC, Hgb now stable ~10. CT abdomen conerning for subcutaneous fluid collection overlying the left hip may represent resolving hematoma or postsurgical seroma andFindings suspicious for an intramuscular hematoma of the left iliopsoas, psoas and iliacus muscles with potential active hemorrhage.  -conservative management per Orthopedics Interventional Radiology at this time   -stable hemoglobin   -no successful aspiration from left hip via IR procedure on 08/14  -bone scan shows postoperative changes of left hip arthroplasty pitted diffuse increase in the soft tissue and delayed uptake around the arthroplasty due to postoperative changes and inflammatory process.   -on merrem per ID; with reducing CRP ID concerned about possible acute osteo          Hypertension  Pressures noted to be in the 180s systolic   Continue home Toprol   Follow up with pharmacy and family regarding home medication list    Paroxysmal atrial fibrillation  -Hx of pAfib. EKG on admit showed NSR, rhythm currently regular  -Continue home metoprolol and monitor on telemetry.  Holding Xarelto due to bleeding concerns-this was affirmed by the daughter via  phone  -s/p ablation per sister; see's outside cardiologist    Delirium  Intermittent.       Antibiotic long-term use  Currently on Meropenem per ID recs.       Chronic anticoagulation  On xarelto at home      Dementia  Neuro checks   Fall precautions   Seizure precautions  Memantine       Hypothyroidism  Elevated TSH on presentation near 19  Started on Synthroid 88 mcg         Anticipate final ID recs and then placement    VTE Risk Mitigation (From admission, onward)           Ordered     Place sequential compression device  Until discontinued         08/06/24 1811                    Discharge Planning   KATI: 8/19/2024     Code Status: DNR   Is the patient medically ready for discharge?:     Reason for patient still in hospital (select all that apply): Pending disposition  Discharge Plan A: Skilled Nursing Facility   Discharge Delays: (!) Change in Medical Condition              Jame Tiwari MD  Department of Hospital Medicine   [facility] - Neurosurgery ([facility])

## 2024-08-18 NOTE — PLAN OF CARE
POC updated and reviewed with the patient at the bedside. Questions regarding POC were encouraged and addressed. VSS, see flowsheets. Patient is AOX 4 at this time. Tele maintained per order. Fall and safety precautions maintained, no signs of injury noted during shift. Patient repositioned independently and with assistance in bed for comfort. Upon exiting room, patient's bed locked in low position, side rails up x 3, bed alarm on, with call light within reach. Instructed patient to call staff for mobility, verbalized understanding. No acute signs of distress noted at this time.      -Cherri Hines    Problem: Adult Inpatient Plan of Care  Goal: Plan of Care Review  Outcome: Progressing  Goal: Patient-Specific Goal (Individualized)  Outcome: Progressing  Goal: Optimal Comfort and Wellbeing  Outcome: Progressing  Goal: Readiness for Transition of Care  Outcome: Progressing     Problem: Infection  Goal: Absence of Infection Signs and Symptoms  Outcome: Progressing     Problem: Skin Injury Risk Increased  Goal: Skin Health and Integrity  Outcome: Progressing     Problem: Wound  Goal: Skin Health and Integrity  Outcome: Progressing

## 2024-08-18 NOTE — SUBJECTIVE & OBJECTIVE
Interval History:  no CP; no HA. No leg pains. Afebrile. Pt wanting to be discharged home    Review of Systems  Objective:     Vital Signs (Most Recent):  Temp: 97.4 °F (36.3 °C) (08/18/24 0756)  Pulse: (!) 58 (08/18/24 0756)  Resp: 16 (08/18/24 0756)  BP: (!) 145/72 (08/18/24 0850)  SpO2: 95 % (08/18/24 0756) Vital Signs (24h Range):  Temp:  [97.4 °F (36.3 °C)-98.3 °F (36.8 °C)] 97.4 °F (36.3 °C)  Pulse:  [55-68] 58  Resp:  [16-18] 16  SpO2:  [93 %-97 %] 95 %  BP: (132-155)/(60-72) 145/72     Weight: 81.2 kg (179 lb 0.2 oz)  Body mass index is 28.04 kg/m².    Intake/Output Summary (Last 24 hours) at 8/18/2024 1116  Last data filed at 8/18/2024 0713  Gross per 24 hour   Intake --   Output 1400 ml   Net -1400 ml         Physical Exam      Clear lungs bilaterally, unlabored breathing, on room air, no cyanosis   Heart sounds indicate a regular rate and rhythm   Awake alert, no acute distress   No facial droop, no slurred speech   5/5 proximal upper and lower extremity strength bilaterally including fist  and hip flexor strength   No obvious upper nor lower extremity edema   Appropriate mood and affect

## 2024-08-19 LAB
ALBUMIN SERPL BCP-MCNC: 2.1 G/DL (ref 3.5–5.2)
ALP SERPL-CCNC: 143 U/L (ref 55–135)
ALT SERPL W/O P-5'-P-CCNC: 10 U/L (ref 10–44)
ANION GAP SERPL CALC-SCNC: 5 MMOL/L (ref 8–16)
AST SERPL-CCNC: 35 U/L (ref 10–40)
BASOPHILS # BLD AUTO: 0.05 K/UL (ref 0–0.2)
BASOPHILS NFR BLD: 0.6 % (ref 0–1.9)
BILIRUB SERPL-MCNC: 0.5 MG/DL (ref 0.1–1)
BUN SERPL-MCNC: 16 MG/DL (ref 8–23)
CALCIUM SERPL-MCNC: 8.4 MG/DL (ref 8.7–10.5)
CHLORIDE SERPL-SCNC: 104 MMOL/L (ref 95–110)
CK SERPL-CCNC: 39 U/L (ref 20–180)
CO2 SERPL-SCNC: 25 MMOL/L (ref 23–29)
CREAT SERPL-MCNC: 1.2 MG/DL (ref 0.5–1.4)
DIFFERENTIAL METHOD BLD: ABNORMAL
EOSINOPHIL # BLD AUTO: 1.4 K/UL (ref 0–0.5)
EOSINOPHIL NFR BLD: 17.4 % (ref 0–8)
ERYTHROCYTE [DISTWIDTH] IN BLOOD BY AUTOMATED COUNT: 20.6 % (ref 11.5–14.5)
EST. GFR  (NO RACE VARIABLE): 44.9 ML/MIN/1.73 M^2
GLUCOSE SERPL-MCNC: 86 MG/DL (ref 70–110)
HCT VFR BLD AUTO: 26.8 % (ref 37–48.5)
HGB BLD-MCNC: 8.3 G/DL (ref 12–16)
IMM GRANULOCYTES # BLD AUTO: 0.11 K/UL (ref 0–0.04)
IMM GRANULOCYTES NFR BLD AUTO: 1.4 % (ref 0–0.5)
LYMPHOCYTES # BLD AUTO: 1.7 K/UL (ref 1–4.8)
LYMPHOCYTES NFR BLD: 20.6 % (ref 18–48)
MCH RBC QN AUTO: 30.4 PG (ref 27–31)
MCHC RBC AUTO-ENTMCNC: 31 G/DL (ref 32–36)
MCV RBC AUTO: 98 FL (ref 82–98)
MONOCYTES # BLD AUTO: 0.8 K/UL (ref 0.3–1)
MONOCYTES NFR BLD: 10.5 % (ref 4–15)
NEUTROPHILS # BLD AUTO: 4 K/UL (ref 1.8–7.7)
NEUTROPHILS NFR BLD: 49.5 % (ref 38–73)
NRBC BLD-RTO: 0 /100 WBC
PLATELET # BLD AUTO: 228 K/UL (ref 150–450)
PMV BLD AUTO: 10.8 FL (ref 9.2–12.9)
POCT GLUCOSE: 113 MG/DL (ref 70–110)
POCT GLUCOSE: 119 MG/DL (ref 70–110)
POTASSIUM SERPL-SCNC: 3.5 MMOL/L (ref 3.5–5.1)
PROT SERPL-MCNC: 7.6 G/DL (ref 6–8.4)
RBC # BLD AUTO: 2.73 M/UL (ref 4–5.4)
SODIUM SERPL-SCNC: 134 MMOL/L (ref 136–145)
WBC # BLD AUTO: 8 K/UL (ref 3.9–12.7)

## 2024-08-19 PROCEDURE — 85025 COMPLETE CBC W/AUTO DIFF WBC: CPT | Performed by: HOSPITALIST

## 2024-08-19 PROCEDURE — 63600175 PHARM REV CODE 636 W HCPCS: Performed by: STUDENT IN AN ORGANIZED HEALTH CARE EDUCATION/TRAINING PROGRAM

## 2024-08-19 PROCEDURE — 25000003 PHARM REV CODE 250: Performed by: HOSPITALIST

## 2024-08-19 PROCEDURE — 97530 THERAPEUTIC ACTIVITIES: CPT | Mod: CQ

## 2024-08-19 PROCEDURE — 25000003 PHARM REV CODE 250: Performed by: STUDENT IN AN ORGANIZED HEALTH CARE EDUCATION/TRAINING PROGRAM

## 2024-08-19 PROCEDURE — 27000207 HC ISOLATION

## 2024-08-19 PROCEDURE — 99233 SBSQ HOSP IP/OBS HIGH 50: CPT | Mod: ,,, | Performed by: PHYSICIAN ASSISTANT

## 2024-08-19 PROCEDURE — 97116 GAIT TRAINING THERAPY: CPT | Mod: CQ

## 2024-08-19 PROCEDURE — 97112 NEUROMUSCULAR REEDUCATION: CPT

## 2024-08-19 PROCEDURE — A4216 STERILE WATER/SALINE, 10 ML: HCPCS | Performed by: HOSPITALIST

## 2024-08-19 PROCEDURE — 02HV33Z INSERTION OF INFUSION DEVICE INTO SUPERIOR VENA CAVA, PERCUTANEOUS APPROACH: ICD-10-PCS | Performed by: HOSPITALIST

## 2024-08-19 PROCEDURE — 76937 US GUIDE VASCULAR ACCESS: CPT

## 2024-08-19 PROCEDURE — 97535 SELF CARE MNGMENT TRAINING: CPT

## 2024-08-19 PROCEDURE — 36415 COLL VENOUS BLD VENIPUNCTURE: CPT | Performed by: HOSPITALIST

## 2024-08-19 PROCEDURE — C1751 CATH, INF, PER/CENT/MIDLINE: HCPCS

## 2024-08-19 PROCEDURE — 36573 INSJ PICC RS&I 5 YR+: CPT

## 2024-08-19 PROCEDURE — 82550 ASSAY OF CK (CPK): CPT | Performed by: HOSPITALIST

## 2024-08-19 PROCEDURE — 92526 ORAL FUNCTION THERAPY: CPT

## 2024-08-19 PROCEDURE — 11000001 HC ACUTE MED/SURG PRIVATE ROOM

## 2024-08-19 PROCEDURE — 80053 COMPREHEN METABOLIC PANEL: CPT | Performed by: HOSPITALIST

## 2024-08-19 RX ORDER — SODIUM CHLORIDE 0.9 % (FLUSH) 0.9 %
10 SYRINGE (ML) INJECTION EVERY 6 HOURS
Status: DISCONTINUED | OUTPATIENT
Start: 2024-08-19 | End: 2024-08-20 | Stop reason: HOSPADM

## 2024-08-19 RX ORDER — SODIUM CHLORIDE 0.9 % (FLUSH) 0.9 %
10 SYRINGE (ML) INJECTION
Status: DISCONTINUED | OUTPATIENT
Start: 2024-08-19 | End: 2024-08-20 | Stop reason: HOSPADM

## 2024-08-19 RX ADMIN — LEVOTHYROXINE SODIUM 25 MCG: 25 TABLET ORAL at 06:08

## 2024-08-19 RX ADMIN — HYDROCODONE BITARTRATE AND ACETAMINOPHEN 1 TABLET: 5; 325 TABLET ORAL at 12:08

## 2024-08-19 RX ADMIN — Medication 10 ML: at 05:08

## 2024-08-19 RX ADMIN — Medication 6 MG: at 08:08

## 2024-08-19 RX ADMIN — MONTELUKAST 10 MG: 10 TABLET, FILM COATED ORAL at 08:08

## 2024-08-19 RX ADMIN — DOCUSATE SODIUM 100 MG: 100 CAPSULE, LIQUID FILLED ORAL at 09:08

## 2024-08-19 RX ADMIN — CARVEDILOL 12.5 MG: 12.5 TABLET, FILM COATED ORAL at 08:08

## 2024-08-19 RX ADMIN — POLYETHYLENE GLYCOL 3350 17 G: 17 POWDER, FOR SOLUTION ORAL at 09:08

## 2024-08-19 RX ADMIN — ACETAMINOPHEN 650 MG: 325 TABLET ORAL at 06:08

## 2024-08-19 RX ADMIN — MEMANTINE 5 MG: 5 TABLET ORAL at 09:08

## 2024-08-19 RX ADMIN — MEROPENEM 2 G: 2 INJECTION, POWDER, FOR SOLUTION INTRAVENOUS at 12:08

## 2024-08-19 RX ADMIN — MEMANTINE 5 MG: 5 TABLET ORAL at 08:08

## 2024-08-19 RX ADMIN — CARVEDILOL 12.5 MG: 12.5 TABLET, FILM COATED ORAL at 09:08

## 2024-08-19 RX ADMIN — HYDROCODONE BITARTRATE AND ACETAMINOPHEN 1 TABLET: 5; 325 TABLET ORAL at 08:08

## 2024-08-19 RX ADMIN — LEVETIRACETAM 500 MG: 500 TABLET, FILM COATED ORAL at 09:08

## 2024-08-19 RX ADMIN — MEROPENEM 2 G: 2 INJECTION, POWDER, FOR SOLUTION INTRAVENOUS at 11:08

## 2024-08-19 RX ADMIN — DOCUSATE SODIUM 100 MG: 100 CAPSULE, LIQUID FILLED ORAL at 08:08

## 2024-08-19 RX ADMIN — FERROUS SULFATE TAB EC 325 MG (65 MG FE EQUIVALENT) 1 EACH: 325 (65 FE) TABLET DELAYED RESPONSE at 09:08

## 2024-08-19 RX ADMIN — Medication 10 ML: at 11:08

## 2024-08-19 RX ADMIN — ATORVASTATIN CALCIUM 40 MG: 40 TABLET, FILM COATED ORAL at 08:08

## 2024-08-19 RX ADMIN — LEVETIRACETAM 500 MG: 500 TABLET, FILM COATED ORAL at 08:08

## 2024-08-19 NOTE — CARE UPDATE
I have reviewed the chart of Shaye Clemente who is hospitalized for the following:    Active Hospital Problems    Diagnosis    *Seizure-like activity    Osteomyelitis    Left hip pain    Delirium    Wound infection    Antibiotic long-term use    Post op infection    Chronic anticoagulation     On xarelto      Hypophosphatemia     Monitor with daily labs  replace      Hyponatremia     Na 135  Monitor with daily cmp  improving      Hypokalemia     K 3.2 POA  Monitor with daily cmp  replace      Hypothyroidism     On synthroid       Dementia    Hypertension    Hip hematoma, left    Paroxysmal atrial fibrillation        Mikala Brar NP  [facility]

## 2024-08-19 NOTE — ASSESSMENT & PLAN NOTE
-Reported seizure at [facility] prior to transfer pt.   -mentation back at baseline  -MRI brain uremarkable  -EEG  neg for seizure activity   - continue Keppra 500 BID  -seizure precautions, fall precautions   -neuro checks

## 2024-08-19 NOTE — PT/OT/SLP PROGRESS
Speech Language Pathology Treatment  Discharge    Patient Name:  Shaye Clemente   MRN:  [id number]  Admitting Diagnosis: Seizure-like activity    Recommendations:                 General Recommendations:  Dysphagia therapy  Diet recommendations:  Regular Diet - IDDSI Level 7, Liquid Diet Level: Thin liquids - IDDSI Level 0   Aspiration Precautions: Small bites/sips and Standard aspiration precautions   General Precautions: Standard, contact, fall  Communication strategies:  none    Assessment:     Shaye Clemente is a 84 y.o. female with an SLP diagnosis of Dysphagia.    Subjective     Awake/alert    Pain/Comfort:  Pain Rating 1: 0/10  Pain Rating Post-Intervention 1: 0/10    Respiratory Status: Room air    Objective:     Has the patient been evaluated by SLP for swallowing?   Yes  Keep patient NPO? No     Pt repositioned upright in bed for PO trials. She denied any difficulty with PO intake. Kj cracker x5 with peanut butter and juice via straw x8 tolerated with timely swallow initiation, adequate oral clearance and no overt s/s of airway compromise. SLP reviewed swallow precautions with pt who verbalized understanding. Recommend regular diet/thin liquids at this time. No further ST warranted at this time. Please re-consult if further ST warranted.     Goals:   Multidisciplinary Problems       SLP Goals          Problem: SLP    Goal Priority Disciplines Outcome   SLP Goal     SLP Progressing   Description: Speech Language Pathology Goals  Goals expected to be met by 8/15/24  1. Pt will tolerate least restrictive diet without overt S/S aspiration, Supervision  2. Pt will participate in full assessment of speech, language and cognition to determine ongoing POC needs -met  3. Educate Pt and family on aspiration precautions and SLP POC  4. Pt will independently verbalize/demonstrate 3 speech strategies to increase intelligibility to 75% or greater in known and unknown contexts.   5. Pt will demonstrate adequate topic  maintenance and/or attend to therapy tasks for 1+ minutes given no cues to redirect.   6. Pt will answer safety awareness/problem solving questions with 75% acc min cues.   7. Pt will follow 2+ step directions with 75% acc given 1 repetition.   8. Pt will complete functional sequencing tasks with 75% acc min cues.                        Plan:       Plan of Care expires:  09/06/24  Plan of Care reviewed with:  patient   SLP Follow-Up:  No       Discharge recommendations:  Low Intensity Therapy       Time Tracking:     SLP Treatment Date:   08/19/24  Speech Start Time:  1129  Speech Stop Time:  1135     Speech Total Time (min):  6 min    Billable Minutes: Treatment Swallowing Dysfunction 6    08/19/2024

## 2024-08-19 NOTE — SUBJECTIVE & OBJECTIVE
Interval History:   No acute events overnight  IR consulted for left hip- drainage/aspiration attempted and was unsuccessful 8/14.  PET CT/FDG not able to be done inpt  PICC placed  Denies dysuria, fevers chills or sweats    Review of Systems   Constitutional:  Negative for chills, diaphoresis, fatigue and fever.   HENT: Negative.     Respiratory:  Negative for cough and shortness of breath.    Cardiovascular:  Negative for leg swelling.   Gastrointestinal:  Negative for blood in stool, diarrhea, nausea and vomiting.   Genitourinary:  Negative for difficulty urinating, dysuria, flank pain and pelvic pain.   Skin:  Negative for rash and wound.   Psychiatric/Behavioral:  Negative for agitation and confusion.      Objective:     Vital Signs (Most Recent):  Temp: 97.8 °F (36.6 °C) (08/19/24 1559)  Pulse: 65 (08/19/24 1559)  Resp: 18 (08/19/24 1559)  BP: (!) 144/73 (08/19/24 1559)  SpO2: 96 % (08/19/24 1559) Vital Signs (24h Range):  Temp:  [97.4 °F (36.3 °C)-98 °F (36.7 °C)] 97.8 °F (36.6 °C)  Pulse:  [59-69] 65  Resp:  [16-19] 18  SpO2:  [94 %-97 %] 96 %  BP: (134-156)/(60-73) 144/73     Weight: 81.2 kg (179 lb 0.2 oz)  Body mass index is 28.04 kg/m².    Estimated Creatinine Clearance: 38.9 mL/min (based on SCr of 1.2 mg/dL).     Physical Exam  Vitals reviewed.   Constitutional:       General: She is not in acute distress.     Appearance: Normal appearance. She is ill-appearing. She is not toxic-appearing or diaphoretic.   HENT:      Head: Normocephalic.      Nose: Nose normal.      Mouth/Throat:      Mouth: Mucous membranes are moist.      Pharynx: Oropharynx is clear.   Eyes:      General:         Right eye: No discharge.         Left eye: No discharge.      Conjunctiva/sclera: Conjunctivae normal.   Cardiovascular:      Rate and Rhythm: Normal rate and regular rhythm.      Pulses: Normal pulses.      Heart sounds: Normal heart sounds. No murmur heard.  Pulmonary:      Effort: Pulmonary effort is normal. No  respiratory distress.      Breath sounds: Normal breath sounds. No stridor.   Abdominal:      General: Abdomen is flat. There is no distension.      Palpations: Abdomen is soft.      Tenderness: There is no abdominal tenderness. There is no right CVA tenderness or left CVA tenderness.   Musculoskeletal:         General: Normal range of motion.      Cervical back: Normal range of motion.      Right lower leg: No edema.      Left lower leg: No edema.   Skin:     Findings: No lesion or rash.      Comments: With healed left hip incision, without surrounding swelling, redness. Without tenderness to palpation. Without drainage or underlying fluctuance.    Neurological:      Mental Status: She is alert and oriented to person, place, and time.      Comments: Mumbling speech resolved and more awake/ alert/ conversant and oriented x4    Psychiatric:         Mood and Affect: Mood normal.          Significant Labs: Blood Culture:   Recent Labs   Lab 08/04/24  1135 08/04/24  1202 08/07/24  1007   LABBLOO No growth after 5 days. No growth after 5 days. No growth after 5 days.     CBC:   Recent Labs   Lab 08/18/24 0312 08/19/24  0346   WBC 7.26 8.00   HGB 8.7* 8.3*   HCT 25.9* 26.8*    228       CMP:   Recent Labs   Lab 08/18/24 0312 08/19/24  0346   * 134*   K 3.6 3.5    104   CO2 29 25   GLU 94 86   BUN 18 16   CREATININE 1.2 1.2   CALCIUM 8.6* 8.4*   PROT 7.7 7.6   ALBUMIN 2.1* 2.1*   BILITOT 0.5 0.5   ALKPHOS 134 143*   AST 29 35   ALT 10 10   ANIONGAP 1* 5*       Microbiology Results (last 7 days)       Procedure Component Value Units Date/Time    Culture, Anaerobic [[id number]]     Order Status: No result Specimen: Joint Fluid from Hip, Left     AFB Culture & Smear [[id number]]     Order Status: No result Specimen: Joint Fluid from Hip, Left     Fungus culture [[id number]]     Order Status: No result Specimen: Joint Fluid from Hip, Left     Gram stain [[id number]]     Order Status: No result Specimen:  Joint Fluid from Hip, Left     Aerobic culture [[id number]]     Order Status: No result Specimen: Hip           Urine Culture:   Recent Labs   Lab 08/04/24  1452   LABURIN ENTEROCOCCUS FAECIUM VRE  50,000 - 99,999 cfu/ml  *     Urine Studies:   Recent Labs   Lab 08/04/24  1452 08/09/24  0919   COLORU Yellow Yellow   APPEARANCEUA Clear Clear   PHUR 7.0 7.0   SPECGRAV 1.015 1.015   PROTEINUA 2+* 2+*   GLUCUA Negative Negative   KETONESU Negative Negative   BILIRUBINUA Negative Negative   OCCULTUA Trace* Trace*   NITRITE Negative Negative   UROBILINOGEN Negative  --    LEUKOCYTESUR Trace* Negative   RBCUA 4 3   WBCUA 8* 2   BACTERIA Many* Rare   SQUAMEPITHEL 4  --    HYALINECASTS 0 3*     All pertinent labs within the past 24 hours have been reviewed.    Significant Imaging: I have reviewed all pertinent imaging results/findings within the past 24 hours.  NM Bone Scan 3 Phase Hip [[id number]] Resulted: 08/15/24 1459   Order Status: Completed Updated: 08/15/24 1501   Narrative:     EXAMINATION:  Three Phase Bone scan >    CLINICAL HISTORY:  Osteomyelitis suspected, hip, xray done;  status post left hip arthroplasty fluid collection and presumed intramuscular hematoma within the left iliopsoas    TECHNIQUE:  Following the IV administration of 21.6 mCi of Tc-99m-MDP, immediate dynamic and early static images of the hip joints were acquired followed by delayed anterior and posterior images of the same region.    COMPARISON:  CT Pelvis 08/13/24    FINDINGS:  On the flow and blood pool images there is flow symmetrical and asymmetric uptake of the tracer in soft tissue around the left hip arthroplasty.    On the delayed views, there is physiologic distribution of the tracer in kidneys and bladder with regions of increased uptake around the left hip arthroplasty at the acetabulum and trochanteric area and inflammatory process.   Impression:       There are scintigraphic findings to suggest postoperative changes of left hip  arthroplasty pitted diffuse increase in the soft tissue and delayed uptake around the arthroplasty due to postoperative changes and inflammatory process.  If infection is strongly suspected, a follow-up indium labeled WBC study is recommended      Electronically signed by: Johana Noriega  Date: 08/15/2024  Time: 14:59     CT Pelvis With IV Contrast [[id number]] Resulted: 08/13/24 1415   Order Status: Completed Updated: 08/13/24 1418   Narrative:     EXAMINATION:  CT PELVIS WITH IV CONTRAST    CLINICAL HISTORY:  Evaluate pelvic fluid collection;    TECHNIQUE:  Low dose axial images, sagittal and coronal reformations were obtained from the iliac crests to the pubic symphysis after the administration of 75 cc Omnipaque 350 intravenous contrast.  Oral contrast was not administered.    COMPARISON:  CT abdomen pelvis 08/05/2024.  CT hip 08/04/2024. Hip radiograph 08/12/2024.    FINDINGS:  BOWEL/MESENTERY: No evidence of bowel obstruction or inflammatory process.    REPRODUCTIVE: Hysterectomy.    URINARY BLADDER: Mildly distended.  Small foci of intraluminal air, likely related to recent instrumentation.    VASCULATURE: No abdominal aortic aneurysm. No significant atherosclerotic calcification.    BONES/EXTRAPERITONEAL SOFT TISSUES: Recent postoperative change of left hip arthroplasty.  Hardware appears intact without perihardware lucency.  Overlying irregular fluid collection measuring approximately 7.0 x 5.1 cm maximal axial dimension (axial image 54).  Surrounding stranding, tracking towards the cutaneous surface and inferiorly along the left quadriceps musculature.  Overall appearance is decreased in size compared to prior 08/05/2024.    Additional heterogeneous fluid collection tracking superiorly within the substance of the left iliopsoas, measuring 4.0 x 2.9 cm maximal axial dimension (axial image 28).  Hyperdense components suggestive of more recent blood products. Overall appearance is slightly increased in size  compared to prior 08/05/2024, noting greater hypodense component compatible with aging blood products.    OTHER: Layering hyperattenuation in the partially visualized gallbladder, similar to prior.    Trace pelvic free fluid.   Impression:       Recent postoperative change of left hip arthroplasty.  Overlying irregular fluid collection appears decreased in size compared to prior CT 08/05/2024.    Presumed intramuscular hematoma within the left iliopsoas, appears slightly increased in size.  Hyperdense components suggestive of more recent blood products.  Hemorrhagic iliopsoas bursitis less likely.    Electronically signed by resident: Hansel Saucedo  Date: 08/13/2024  Time: 13:34    Electronically signed by: Lalo Orozco MD  Date: 08/13/2024  Time: 14:15     X-Ray Hip 2 or 3 views Left with Pelvis when performed [[id number]] Resulted: 08/12/24 1453   Order Status: Completed Updated: 08/12/24 1455   Narrative:     EXAMINATION:  XR HIP WITH PELVIS WHEN PERFORMED 2 OR 3 VIEWS LEFT    CLINICAL HISTORY:  left hip fluid collection per CT read;    TECHNIQUE:  AP view of the pelvis and frog leg lateral view of the left hip were performed.    COMPARISON:  CT hip August 4, 2024    FINDINGS:  There are surgical changes of total right hip arthroplasty.  The surrounding osseous structures are intact without fracture or osseous destructive process.   Impression:       As above      Electronically signed by: Joceline Freire MD  Date: 08/12/2024  Time: 14:53   X-Ray Chest 1 View [[id number]] Resulted: 08/08/24 2126   Order Status: Completed Updated: 08/08/24 2128   Narrative:     EXAMINATION:  XR CHEST 1 VIEW    CLINICAL HISTORY:  concern for aspiration;    TECHNIQUE:  Single frontal view of the chest was performed.    COMPARISON:  08/04/2024.    FINDINGS:  There are continued coarse chronic interstitial opacities, similar prior.  There is no new focal consolidation or significant detrimental change from prior.  There are multiple  surgical clips.  The pleural spaces are clear.  The cardiac silhouette is enlarged.  There are calcifications of the aortic arch.  Osseous structures demonstrate degenerative changes.   Impression:       No significant detrimental change from prior study.      Electronically signed by: Ernst Whipple  Date: 08/08/2024  Time: 21:26      Imaging History     2024    Date Procedure Name Study Review Link PACS Link Status Accession Number Location   08/12/24 02:09 PM X-Ray Hip 2 or 3 views Left with Pelvis when performed Study Review  Images Final [id number] [facility]   08/11/24 04:04 PM Cardiac monitoring strips Study Review  Final     08/09/24 01:21 AM Cardiac monitoring strips Study Review  Final     08/08/24 08:32 PM X-Ray Chest 1 View Study Review  Images Final [id number] [facility]   08/05/24 02:04 PM Echo Saline Bubble? No; Ultrasound enhancing contrast? No Study Review  Images Final [id number] [facility]   08/05/24 12:02 PM MRI Brain Without Contrast Study Review  Images Final [id number] [facility]   08/05/24 12:00 PM CT Abdomen Pelvis  Without Contrast Study Review  Images Final [id number] [facility]   08/04/24 06:55 PM CT Hip Without Contrast Left Study Review  Images Final [id number] [facility]   08/04/24 12:36 PM X-Ray Chest AP Portable Study Review  Images Final [id number] [facility]   08/04/24 12:30 PM CT Head Without Contrast Study Review  Images Final [id number] [facility]

## 2024-08-19 NOTE — PT/OT/SLP PROGRESS
"Physical Therapy Treatment  Co Treatment with Occupational Therapy    Patient Name:  Shaye Clemente   MRN:  [id number]    Recommendations:     Discharge Recommendations: Moderate Intensity Therapy  Discharge Equipment Recommendations: hospital bed, lift device, wheelchair  Barriers to discharge: Decreased caregiver support and Increased level of assistance    Assessment:     Shaye Clemente is a 84 y.o. female admitted with a medical diagnosis of Seizure-like activity.  She presents with the following impairments/functional limitations: weakness, impaired endurance, impaired self care skills, impaired functional mobility, gait instability, impaired balance, decreased safety awareness, pain, decreased lower extremity function, decreased coordination, orthopedic precautions, impaired cardiopulmonary response to activity.    Pt met with HOB elevated and agreeable to session. Pt tolerates session well with emphasis on bed mobility, transfers, and gait training. Pt making progress towards therapy goals as indicated by decreased assistance required with sit <> stand transfers and gait training at this time. Pt also tolerates increased total ambulation distance.  Pt will continue to benefit from skilled therapy services.    Rehab Prognosis: Good; patient would benefit from acute skilled PT services to address these deficits and reach maximum level of function.    Recent Surgery: * No surgery found *      Plan:     During this hospitalization, patient to be seen 4 x/week to address the identified rehab impairments via gait training, therapeutic activities, therapeutic exercises, neuromuscular re-education and progress toward the following goals:    Plan of Care Expires:  09/07/24    Subjective     Chief Complaint: 7/10 back pain and unrated L hip pain  Patient/Family Comments/goals: "I just want to go home to take care of my 2 cats"  Pain/Comfort:  Pain Rating 1: 7/10  Location - Side 1: Bilateral  Location - Orientation 1: " generalized  Location 1: back  Pain Addressed 1: Reposition, Distraction  Pain Rating Post-Intervention 1: 7/10  Pain Rating 2: other (see comments) (pain not rated)  Location - Side 2: Left  Location - Orientation 2: proximal  Location 2: hip  Pain Addressed 2: Reposition, Distraction  Pain Rating Post-Intervention 2: other (see comments) (pain not rated)      Objective:     Communicated with RN (Francisco) prior to session.  Patient found HOB elevated with bed alarm, PureWick upon PTA entry to room.     General Precautions: Standard, contact, fall  Orthopedic Precautions: LLE weight bearing as tolerated  Braces: N/A  Respiratory Status: Room air    Cognition:   Orientation:  Affect: pleasant and cooperative  Alertness: eyes open 100 % of session  Insight: good insight into deficits  Attention: attends to task  Command Following: patient follows 100 % of 1-step commands  Communication:  Sequencing: Intact    Functional Mobility:  Bed Mobility:     Rolling Left:  moderate assistance with use of bedrail   Verbal and tactile cues provided for sequencing and hand over hand assistance provided to reach for bedrail  Scooting: anteriorly to EOB stand by assistance  Supine to Sit: moderate assistance x2 persons  for Trunk/BLEs  Transfers:     Sit to Stand:  from EOB x1 trial contact guard assistance x2 persons  and x1 trial minimum assistance x2 persons  with no AD and hand-held assist  Bed to Chair: Minimum assistance with no AD and SIMIN HHA via step transfer  Gait:   Pt ambulates ~4 feet fwd and 4 feet backward with minimum assistance and  no AD and SIMIN HHA  Deviations noted: decreased step length, decreased jus, decreased gait speed, decreased SIMIN foot clearance, increased knee flexion  All lines remained intact and gait belt utilized.  Balance:   Sitting Balance at EOB:  Level of Assist Required: Contact Guard Assistance- Min A  Deviations noted: R posterolateral lean/ offloading painful L hip  Verbal and tactile  cues/Min A provided to correct balance to midline.  Pt educated on importance of weightbearing to L hip for healing process.   Total Time Sitting EOB: ~10 minutes      AM-PAC 6 CLICK MOBILITY  Turning over in bed (including adjusting bedclothes, sheets and blankets)?: 2  Sitting down on and standing up from a chair with arms (e.g., wheelchair, bedside commode, etc.): 3  Moving from lying on back to sitting on the side of the bed?: 2  Moving to and from a bed to a chair (including a wheelchair)?: 3  Need to walk in hospital room?: 2  Climbing 3-5 steps with a railing?: 1  Basic Mobility Total Score: 13       Treatment & Education:  Patient provided with daily orientation and goals of this PT session.     Pt educated to call for assistance and to transfer with hospital staff only.  Also, pt was educated on the effects of prolonged immobility and the importance of performing OOB activity and exercises to promote healing and reduce recovery time.    Co tx performed with OT due to patient need for 2 skilled therapist to ensure patient and staff safety and to accommondate for activity tolerance.    Patient left up in chair with all lines intact, call button in reach, RN notified, and telesitter present.    GOALS:   Multidisciplinary Problems       Physical Therapy Goals          Problem: Physical Therapy    Goal Priority Disciplines Outcome Goal Variances Interventions   Physical Therapy Goal     PT, PT/OT Progressing     Description: Goals to be met by: 24     Patient will increase functional independence with mobility by performin. Supine to sit with MInimal Assistance  2. Sit to supine with MInimal Assistance  3. Sit to stand transfer with Minimal Assistance  4. Bed to chair transfer with Minimal Assistance using LRAD as needed  5. Gait  x 150 feet with Minimal Assistance using LRAD as needed.   6. Ascend/descend 3 stair with no Handrails and Minimal Assistance  7. Lower extremity exercise program x15 reps  per handout, with assistance as needed    Hospital Bed - Patient requires a hospital bed for positioning of the body in ways that are not feasible with an ordinary bed. The patient requires special positioning for pain relief, limited mobility, and/or being unable to independently make changes in body position without the use of a hospital bed. Pillows and wedges will not be adequate for resolving these positional issues.     Wheelchair - Patient has a mobility limitation that significantly impairs their ability to participate in one or more mobility related activities of daily living in customary locations in the home. The mobility limitation cannot be sufficiently resolved by the use of a cane or walker. The use of a manual wheelchair will greatly improve the patient's ability to participate in MRADLs. The patient will use the wheelchair on a regular basis at home. They have expressed their willingness to use a manual wheelchair in the home, and have a caregiver who is available and willing to assist with the wheelchair if needed.                         Time Tracking:     PT Received On: 08/19/24  PT Start Time: 1000     PT Stop Time: 1024  PT Total Time (min): 24 min     Billable Minutes: Gait Training 15 and Therapeutic Activity 9    Treatment Type: Treatment  PT/PTA: PTA     Number of PTA visits since last PT visit: 1 08/19/2024

## 2024-08-19 NOTE — PLAN OF CARE
Problem: Adult Inpatient Plan of Care  Goal: Plan of Care Review  Outcome: Progressing  Goal: Absence of Hospital-Acquired Illness or Injury  Outcome: Progressing     Problem: Infection  Goal: Absence of Infection Signs and Symptoms  Outcome: Progressing     Problem: Skin Injury Risk Increased  Goal: Skin Health and Integrity  Outcome: Progressing     Problem: Acute Kidney Injury/Impairment  Goal: Improved Oral Intake  Outcome: Progressing     Problem: Wound  Goal: Optimal Pain Control and Function  Outcome: Progressing

## 2024-08-19 NOTE — PROGRESS NOTES
"Yossi Vargas - Neurosurgery ([facility])  Infectious Disease  Progress Note    Patient Name: Shaye Clemente  MRN: [id number]  Admission Date: 8/6/2024  Length of Stay: 13 days  Attending Physician: Jame Tiwari MD  Primary Care Provider: Dottie Zuñiga NP-C    Isolation Status: Contact  Assessment/Plan:      ID  Post op infection  84F with h/o pAFib (on xarelto), dementia, femoral neck fracture, s/p left bipolar hemiarthroplasty with hardware placed on 6/4/24 at [facility]. (Minimal records available at this time). Hosp course c/b bacteremia 06/24 1 of 2 bottles +proteus vulgaris (R-ampicillin, cefuroxime, tetracycline); and surgical wound infection. Left hip wound cx 06/27: +PSA (I-aztreonam), +Staph hemolyticus (R-tetracycline, S-vanc), +E.faecalis (panS), +proteus vulgaris (R-ampicillin, cefuroxime, Tetracycline). Pt underwent surgical debridement (no Op notes available) 07/01, surgical cxs NG.  Wound swab cxs 07/01 No growth. Also, records reveal ESBL-e.coli UTI 06/25. It seems pt has been maintained on Iv-Erta since then, despite not covering all bacteria isolated on cxs from infected surgical wound; and per family, pt was to complete prolonged course Erta ~6wks, with impending BRENDAN. -- Pt transferred to [facility] from [facility] for further work-up of unwitnessed seizure, and anemia. Unclear what provoked seizure. EEG was negative.    Ct-a/p and Ct-hip reveal stable hardware, but note non-specific soft-tissue fluid collection at lateral hip measuring 9.4 x 8.7 x 4.8cm, as well as IM hematoma of left iliopsoas, psoas, and iliacus muscle, with potential active hemorrhage.    ID was consulted as "Wound infection. Was on ertapenem at nursing home. Should this be continued" On 08/09, switched erta to merrem while awaiting additional records.    Stable.  Blood cultures finalized negative.  IR unsuccessfully attempted left hip.  Remains on meropenem.  Afebrile.   Repeat CT L hip shows fluid smaller since " 8/5.  Bone scan:here are scintigraphic findings to suggest postoperative changes of left hip arthroplasty pitted diffuse increase in the soft tissue and delayed uptake around the arthroplasty due to postoperative changes and inflammatory process.  If infection is strongly suspected, a follow-up indium labeled WBC study is recommended.  Her speech and alertness has improved. CRP improved from 287 to 41.  She denies any systemic signs of infection.  She has improved over hospital course on  meropenem.  Per dw sister by phone - agrees patient has improved.  PET CT FDG rec'd but not able to be done as inpt.    Recommendations:  Continue meropenem 2g IV q12h  Suspect needs re-treatment for left hip infection x 6 weeks given improvement clinically, ongoing cf L hip infection and objectively CRP has improved with therapy.  PICC - Placed  Note: She was followed by infectious disease, Dr. Zuhair Nascimento in [place].  Discussed with ID staff  FU 2 weeks in ID clinic or virtually    Outpatient Antibiotic Therapy Plan:        1) Infection: L Hip     2) Discharge Antibiotics:    Intravenous antibiotics:  Meropenem 2g IV q12h    Oral antibiotics:  None    3) Therapy Duration:  6 weeks    Estimated end date of IV antibiotics: 9/20/24    4) Outpatient Weekly Labs:    Order the following labs to be drawn on Mondays:   CBC  CMP   CRP        5) Fax Lab Results to Infectious Diseases Provider: Montana Ralph PA-C    [facility] Fax Number: [phone]    6) Outpatient Infectious Diseases Follow-up    Follow-up appointment will be arranged by the ID clinic and will be found in the patient's appointments tab.    Prior to discharge, please ensure the patient's follow-up has been scheduled.    If there is still no follow-up scheduled prior to discharge, please send an EPIC message to [facility] Clinical Pool or Call Infectious Diseases Dept.                        Anticipated Disposition: tbd    Thank you for your consult. I will sign off.  "Please contact us if you have any additional questions.    OCTAVIA Murphy  Infectious Disease  [facility] - Neurosurgery (Hospital)    Subjective:     Principal Problem:Seizure-like activity    HPI: Ms. Clemente is a 85 yo female with PMH of left femoral neck fracture, s/p left bipolar hemiarthroplasty on 6/4/24, paroxysmal atrial fibrillation (on Xarelto), hypothyroidism, dementia, and hyperlipidemia admitted to [facility] (from [facility]) on August 4 with low hemoglobin. While at OSH, work up was nonrevealing for source of anemia, pt had reported tarry stools, though stool hemoccult was negative. Pt reported swelling around her left hip, and CT left hip noted soft tissue swelling/fluid in deep left pelvis, presacral region. Pt was also treated empirically for cysitits with cipro. During a CT of her AP, pt had a witnessed seizure that required keppra. Neurology was consulted and MRI brain and EEG without acute findings. While at OSH, ortho surgery and interventional radiology was consulted at [facility] and surgical intervention was not planned, though recommended to continue close monitoring of H/H and if continues to drop recommending CTA of abdomen/pelvis to see if IR intervention is indicated. Ultimately, pt was transferred to [facility] for EEG, neurology eval 2/2 new onset seizures, as well as close monitoring in H/H.     Prior to admission to [facility], pt was sent to [facility] as there was concerns for left hip pain, redness, and swelling. Per limited records available, appears pt was bacteremic with proteus vulgaris on 6/24/24, also with Ecoli ESBL uti. It appears Dr. Dave Nascimento was following pt. Per ED notes on 7/25/24 from [facility], pt was noted that "pt underwent left hip debridement for post op left hip infection on July 1st, she had proteus in her tissues, culture later was positive for pseudomonas, staph hemolyticus, and enterococcus. Pt was only on rocephin, though it was " "planned to continue cefepime,  however it was discontinued and pt was started on rocephin alone."     ID was consulted d/t "Wound infection. Was on ertapenem at nursing home. Should this be continued"        Interval History:   No acute events overnight  IR consulted for left hip- drainage/aspiration attempted and was unsuccessful 8/14.  PET CT/FDG not able to be done inpt  PICC placed  Denies dysuria, fevers chills or sweats    Review of Systems   Constitutional:  Negative for chills, diaphoresis, fatigue and fever.   HENT: Negative.     Respiratory:  Negative for cough and shortness of breath.    Cardiovascular:  Negative for leg swelling.   Gastrointestinal:  Negative for blood in stool, diarrhea, nausea and vomiting.   Genitourinary:  Negative for difficulty urinating, dysuria, flank pain and pelvic pain.   Skin:  Negative for rash and wound.   Psychiatric/Behavioral:  Negative for agitation and confusion.      Objective:     Vital Signs (Most Recent):  Temp: 97.8 °F (36.6 °C) (08/19/24 1559)  Pulse: 65 (08/19/24 1559)  Resp: 18 (08/19/24 1559)  BP: (!) 144/73 (08/19/24 1559)  SpO2: 96 % (08/19/24 1559) Vital Signs (24h Range):  Temp:  [97.4 °F (36.3 °C)-98 °F (36.7 °C)] 97.8 °F (36.6 °C)  Pulse:  [59-69] 65  Resp:  [16-19] 18  SpO2:  [94 %-97 %] 96 %  BP: (134-156)/(60-73) 144/73     Weight: 81.2 kg (179 lb 0.2 oz)  Body mass index is 28.04 kg/m².    Estimated Creatinine Clearance: 38.9 mL/min (based on SCr of 1.2 mg/dL).     Physical Exam  Vitals reviewed.   Constitutional:       General: She is not in acute distress.     Appearance: Normal appearance. She is ill-appearing. She is not toxic-appearing or diaphoretic.   HENT:      Head: Normocephalic.      Nose: Nose normal.      Mouth/Throat:      Mouth: Mucous membranes are moist.      Pharynx: Oropharynx is clear.   Eyes:      General:         Right eye: No discharge.         Left eye: No discharge.      Conjunctiva/sclera: Conjunctivae normal. "   Cardiovascular:      Rate and Rhythm: Normal rate and regular rhythm.      Pulses: Normal pulses.      Heart sounds: Normal heart sounds. No murmur heard.  Pulmonary:      Effort: Pulmonary effort is normal. No respiratory distress.      Breath sounds: Normal breath sounds. No stridor.   Abdominal:      General: Abdomen is flat. There is no distension.      Palpations: Abdomen is soft.      Tenderness: There is no abdominal tenderness. There is no right CVA tenderness or left CVA tenderness.   Musculoskeletal:         General: Normal range of motion.      Cervical back: Normal range of motion.      Right lower leg: No edema.      Left lower leg: No edema.   Skin:     Findings: No lesion or rash.      Comments: With healed left hip incision, without surrounding swelling, redness. Without tenderness to palpation. Without drainage or underlying fluctuance.    Neurological:      Mental Status: She is alert and oriented to person, place, and time.      Comments: Mumbling speech resolved and more awake/ alert/ conversant and oriented x4    Psychiatric:         Mood and Affect: Mood normal.          Significant Labs: Blood Culture:   Recent Labs   Lab 08/04/24  1135 08/04/24  1202 08/07/24  1007   LABBLOO No growth after 5 days. No growth after 5 days. No growth after 5 days.     CBC:   Recent Labs   Lab 08/18/24 0312 08/19/24  0346   WBC 7.26 8.00   HGB 8.7* 8.3*   HCT 25.9* 26.8*    228       CMP:   Recent Labs   Lab 08/18/24 0312 08/19/24  0346   * 134*   K 3.6 3.5    104   CO2 29 25   GLU 94 86   BUN 18 16   CREATININE 1.2 1.2   CALCIUM 8.6* 8.4*   PROT 7.7 7.6   ALBUMIN 2.1* 2.1*   BILITOT 0.5 0.5   ALKPHOS 134 143*   AST 29 35   ALT 10 10   ANIONGAP 1* 5*       Microbiology Results (last 7 days)       Procedure Component Value Units Date/Time    Culture, Anaerobic [[id number]]     Order Status: No result Specimen: Joint Fluid from Hip, Left     AFB Culture & Smear [[id number]]     Order Status:  No result Specimen: Joint Fluid from Hip, Left     Fungus culture [[id number]]     Order Status: No result Specimen: Joint Fluid from Hip, Left     Gram stain [[id number]]     Order Status: No result Specimen: Joint Fluid from Hip, Left     Aerobic culture [[id number]]     Order Status: No result Specimen: Hip           Urine Culture:   Recent Labs   Lab 08/04/24  1452   LABURIN ENTEROCOCCUS FAECIUM VRE  50,000 - 99,999 cfu/ml  *     Urine Studies:   Recent Labs   Lab 08/04/24  1452 08/09/24  0919   COLORU Yellow Yellow   APPEARANCEUA Clear Clear   PHUR 7.0 7.0   SPECGRAV 1.015 1.015   PROTEINUA 2+* 2+*   GLUCUA Negative Negative   KETONESU Negative Negative   BILIRUBINUA Negative Negative   OCCULTUA Trace* Trace*   NITRITE Negative Negative   UROBILINOGEN Negative  --    LEUKOCYTESUR Trace* Negative   RBCUA 4 3   WBCUA 8* 2   BACTERIA Many* Rare   SQUAMEPITHEL 4  --    HYALINECASTS 0 3*     All pertinent labs within the past 24 hours have been reviewed.    Significant Imaging: I have reviewed all pertinent imaging results/findings within the past 24 hours.  NM Bone Scan 3 Phase Hip [[id number]] Resulted: 08/15/24 1459   Order Status: Completed Updated: 08/15/24 1501   Narrative:     EXAMINATION:  Three Phase Bone scan >    CLINICAL HISTORY:  Osteomyelitis suspected, hip, xray done;  status post left hip arthroplasty fluid collection and presumed intramuscular hematoma within the left iliopsoas    TECHNIQUE:  Following the IV administration of 21.6 mCi of Tc-99m-MDP, immediate dynamic and early static images of the hip joints were acquired followed by delayed anterior and posterior images of the same region.    COMPARISON:  CT Pelvis 08/13/24    FINDINGS:  On the flow and blood pool images there is flow symmetrical and asymmetric uptake of the tracer in soft tissue around the left hip arthroplasty.    On the delayed views, there is physiologic distribution of the tracer in kidneys and bladder with regions of  increased uptake around the left hip arthroplasty at the acetabulum and trochanteric area and inflammatory process.   Impression:       There are scintigraphic findings to suggest postoperative changes of left hip arthroplasty pitted diffuse increase in the soft tissue and delayed uptake around the arthroplasty due to postoperative changes and inflammatory process.  If infection is strongly suspected, a follow-up indium labeled WBC study is recommended      Electronically signed by: Johana Noriega  Date: 08/15/2024  Time: 14:59     CT Pelvis With IV Contrast [[id number]] Resulted: 08/13/24 1415   Order Status: Completed Updated: 08/13/24 1418   Narrative:     EXAMINATION:  CT PELVIS WITH IV CONTRAST    CLINICAL HISTORY:  Evaluate pelvic fluid collection;    TECHNIQUE:  Low dose axial images, sagittal and coronal reformations were obtained from the iliac crests to the pubic symphysis after the administration of 75 cc Omnipaque 350 intravenous contrast.  Oral contrast was not administered.    COMPARISON:  CT abdomen pelvis 08/05/2024.  CT hip 08/04/2024. Hip radiograph 08/12/2024.    FINDINGS:  BOWEL/MESENTERY: No evidence of bowel obstruction or inflammatory process.    REPRODUCTIVE: Hysterectomy.    URINARY BLADDER: Mildly distended.  Small foci of intraluminal air, likely related to recent instrumentation.    VASCULATURE: No abdominal aortic aneurysm. No significant atherosclerotic calcification.    BONES/EXTRAPERITONEAL SOFT TISSUES: Recent postoperative change of left hip arthroplasty.  Hardware appears intact without perihardware lucency.  Overlying irregular fluid collection measuring approximately 7.0 x 5.1 cm maximal axial dimension (axial image 54).  Surrounding stranding, tracking towards the cutaneous surface and inferiorly along the left quadriceps musculature.  Overall appearance is decreased in size compared to prior 08/05/2024.    Additional heterogeneous fluid collection tracking superiorly within  the substance of the left iliopsoas, measuring 4.0 x 2.9 cm maximal axial dimension (axial image 28).  Hyperdense components suggestive of more recent blood products. Overall appearance is slightly increased in size compared to prior 08/05/2024, noting greater hypodense component compatible with aging blood products.    OTHER: Layering hyperattenuation in the partially visualized gallbladder, similar to prior.    Trace pelvic free fluid.   Impression:       Recent postoperative change of left hip arthroplasty.  Overlying irregular fluid collection appears decreased in size compared to prior CT 08/05/2024.    Presumed intramuscular hematoma within the left iliopsoas, appears slightly increased in size.  Hyperdense components suggestive of more recent blood products.  Hemorrhagic iliopsoas bursitis less likely.    Electronically signed by resident: Hansel Saucedo  Date: 08/13/2024  Time: 13:34    Electronically signed by: Lalo Orozco MD  Date: 08/13/2024  Time: 14:15     X-Ray Hip 2 or 3 views Left with Pelvis when performed [[id number]] Resulted: 08/12/24 1453   Order Status: Completed Updated: 08/12/24 1455   Narrative:     EXAMINATION:  XR HIP WITH PELVIS WHEN PERFORMED 2 OR 3 VIEWS LEFT    CLINICAL HISTORY:  left hip fluid collection per CT read;    TECHNIQUE:  AP view of the pelvis and frog leg lateral view of the left hip were performed.    COMPARISON:  CT hip August 4, 2024    FINDINGS:  There are surgical changes of total right hip arthroplasty.  The surrounding osseous structures are intact without fracture or osseous destructive process.   Impression:       As above      Electronically signed by: Joceline Freire MD  Date: 08/12/2024  Time: 14:53   X-Ray Chest 1 View [[id number]] Resulted: 08/08/24 2126   Order Status: Completed Updated: 08/08/24 2128   Narrative:     EXAMINATION:  XR CHEST 1 VIEW    CLINICAL HISTORY:  concern for aspiration;    TECHNIQUE:  Single frontal view of the chest was  performed.    COMPARISON:  08/04/2024.    FINDINGS:  There are continued coarse chronic interstitial opacities, similar prior.  There is no new focal consolidation or significant detrimental change from prior.  There are multiple surgical clips.  The pleural spaces are clear.  The cardiac silhouette is enlarged.  There are calcifications of the aortic arch.  Osseous structures demonstrate degenerative changes.   Impression:       No significant detrimental change from prior study.      Electronically signed by: Ernst Whipple  Date: 08/08/2024  Time: 21:26      Imaging History     2024    Date Procedure Name Study Review Link PACS Link Status Accession Number Location   08/12/24 02:09 PM X-Ray Hip 2 or 3 views Left with Pelvis when performed Study Review  Images Final [id number] [facility]   08/11/24 04:04 PM Cardiac monitoring strips Study Review  Final     08/09/24 01:21 AM Cardiac monitoring strips Study Review  Final     08/08/24 08:32 PM X-Ray Chest 1 View Study Review  Images Final [id number] [facility]   08/05/24 02:04 PM Echo Saline Bubble? No; Ultrasound enhancing contrast? No Study Review  Images Final [id number] [facility]   08/05/24 12:02 PM MRI Brain Without Contrast Study Review  Images Final [id number] [facility]   08/05/24 12:00 PM CT Abdomen Pelvis  Without Contrast Study Review  Images Final [id number] [facility]   08/04/24 06:55 PM CT Hip Without Contrast Left Study Review  Images Final [id number] [facility]   08/04/24 12:36 PM X-Ray Chest AP Portable Study Review  Images Final [id number] [facility]   08/04/24 12:30 PM CT Head Without Contrast Study Review  Images Final [id number] [facility]

## 2024-08-19 NOTE — PROCEDURES
"Shaye Clemente is a 84 y.o. female patient.    Temp: 98 °F (36.7 °C) (08/19/24 1312)  Pulse: 62 (08/19/24 1312)  Resp: 18 (08/19/24 1312)  BP: 134/64 (08/19/24 1312)  SpO2: 96 % (08/19/24 1312)  Weight: 81.2 kg (179 lb 0.2 oz) (08/17/24 0600)  Height: 5' 7" (170.2 cm) (08/06/24 1855)    PICC  Date/Time: 8/19/2024 3:00 PM  Location procedure was performed: [facility] PICC LINE PLACEMENT  Performed by: Deisy Freitas, RN  Assisting provider: Chadwick Freitas LPN  Consent Done: Yes  Time out: Immediately prior to procedure a time out was called to verify the correct patient, procedure, equipment, support staff and site/side marked as required  Indications: med administration  Anesthesia: local infiltration  Local anesthetic: lidocaine 1% without epinephrine  Anesthetic Total (mL): 3  Preparation: skin prepped with ChloraPrep  Skin prep agent dried: skin prep agent completely dried prior to procedure  Sterile barriers: all five maximum sterile barriers used - cap, mask, sterile gown, sterile gloves, and large sterile sheet  Hand hygiene: hand hygiene performed prior to central venous catheter insertion  Location details: right basilic  Catheter type: double lumen  Catheter Length: 32cm    Ultrasound guidance: yes  Vessel Caliber: medium and patent, compressibility normal  Vascular Doppler: not done  Needle advanced into vessel with real time Ultrasound guidance.  Guidewire confirmed in vessel.  Image recorded and saved.  Sterile sheath used.  no esophageal manometryNumber of attempts: 1  Post-procedure: blood return through all ports, sterile dressing applied and chlorhexidine patch  Technical procedures used: 3CG  Specimens: No  Implants: No  Assessment: placement verified by x-ray  Complications: none          Name Chadwick Freitas LPN   8/19/2024    "

## 2024-08-19 NOTE — CONSULTS
D/L PICC placed in right basilic vein, 32 cm in length with 0 cm exposed. Arm circumference 32 cm. Lot#[id number]

## 2024-08-19 NOTE — PLAN OF CARE
ID Note:    Patient not seen today.  Chart reviewed.    Afebrile, no leukocytosis.  HDS  Remains on IV meropnem  Pending further imaging determination  - PET CT FDG if possible. If cannot obtain PET CT as inpatient, recommend empiric treatment of osteomyelitis with 6 weeks meropenem based on prior cultures.  May need tunneled line given renal function.     ID will see patient tomorrow.     Thank you.   Please Secure Chat for any questions or concerns.  LAILA Simon, ANP-C  Infectious Disease

## 2024-08-19 NOTE — ASSESSMENT & PLAN NOTE
"84F with h/o pAFib (on xarelto), dementia, femoral neck fracture, s/p left bipolar hemiarthroplasty with hardware placed on 6/4/24 at [facility]. (Minimal records available at this time). Hosp course c/b bacteremia 06/24 1 of 2 bottles +proteus vulgaris (R-ampicillin, cefuroxime, tetracycline); and surgical wound infection. Left hip wound cx 06/27: +PSA (I-aztreonam), +Staph hemolyticus (R-tetracycline, S-vanc), +E.faecalis (panS), +proteus vulgaris (R-ampicillin, cefuroxime, Tetracycline). Pt underwent surgical debridement (no Op notes available) 07/01, surgical cxs NG.  Wound swab cxs 07/01 No growth. Also, records reveal ESBL-e.coli UTI 06/25. It seems pt has been maintained on Iv-Erta since then, despite not covering all bacteria isolated on cxs from infected surgical wound; and per family, pt was to complete prolonged course Erta ~6wks, with impending BRENDAN. -- Pt transferred to [facility] from [facility] for further work-up of unwitnessed seizure, and anemia. Unclear what provoked seizure. EEG was negative.    Ct-a/p and Ct-hip reveal stable hardware, but note non-specific soft-tissue fluid collection at lateral hip measuring 9.4 x 8.7 x 4.8cm, as well as IM hematoma of left iliopsoas, psoas, and iliacus muscle, with potential active hemorrhage.    ID was consulted as "Wound infection. Was on ertapenem at nursing home. Should this be continued" On 08/09, switched erta to merrem while awaiting additional records.    Stable.  Blood cultures finalized negative.  IR unsuccessfully attempted left hip.  Remains on meropenem.  Afebrile.   Repeat CT L hip shows fluid smaller since 8/5.  Bone scan:here are scintigraphic findings to suggest postoperative changes of left hip arthroplasty pitted diffuse increase in the soft tissue and delayed uptake around the arthroplasty due to postoperative changes and inflammatory process.  If infection is strongly suspected, a follow-up indium labeled WBC study is recommended. "  Her speech and alertness has improved. CRP improved from 287 to 41.  She denies any systemic signs of infection.  She has improved over hospital course on  meropenem.  Per dw sister by phone - agrees patient has improved.  PET CT FDG rec'd but not able to be done as inpt.    Recommendations:  Continue meropenem 2g IV q12h  Suspect needs re-treatment for left hip infection x 6 weeks given improvement clinically, ongoing cf L hip infection and objectively CRP has improved with therapy.  PICC - Placed  Note: She was followed by infectious disease, Dr. Zuhair Nascimento in [place].  Discussed with ID staff  FU 2 weeks in ID clinic or virtually    Outpatient Antibiotic Therapy Plan:        1) Infection: L Hip     2) Discharge Antibiotics:    Intravenous antibiotics:  Meropenem 2g IV q12h    Oral antibiotics:  None    3) Therapy Duration:  6 weeks    Estimated end date of IV antibiotics: 9/20/24    4) Outpatient Weekly Labs:    Order the following labs to be drawn on Mondays:   CBC  CMP   CRP        5) Fax Lab Results to Infectious Diseases Provider: Montana Ralph PA-C    [facility] Fax Number: [phone]    6) Outpatient Infectious Diseases Follow-up    Follow-up appointment will be arranged by the ID clinic and will be found in the patient's appointments tab.    Prior to discharge, please ensure the patient's follow-up has been scheduled.    If there is still no follow-up scheduled prior to discharge, please send an DocTree message to [facility] Clinical Pool or Call Infectious Diseases Dept.

## 2024-08-19 NOTE — SUBJECTIVE & OBJECTIVE
Interval History:   Patient complaining of left leg pain that started sometimes since last night.  Lower extremity ultrasound negative for DVT.  CPK unremarkable.  No chest pain reported or headache reported.  Afebrile. PICC line placed.     Review of Systems  Objective:     Vital Signs (Most Recent):  Temp: 98 °F (36.7 °C) (08/19/24 1312)  Pulse: 62 (08/19/24 1312)  Resp: 18 (08/19/24 1312)  BP: 134/64 (08/19/24 1312)  SpO2: 96 % (08/19/24 1312) Vital Signs (24h Range):  Temp:  [97.4 °F (36.3 °C)-98 °F (36.7 °C)] 98 °F (36.7 °C)  Pulse:  [59-69] 62  Resp:  [16-19] 18  SpO2:  [94 %-97 %] 96 %  BP: (134-156)/(60-68) 134/64     Weight: 81.2 kg (179 lb 0.2 oz)  Body mass index is 28.04 kg/m².    Intake/Output Summary (Last 24 hours) at 8/19/2024 5182  Last data filed at 8/19/2024 0538  Gross per 24 hour   Intake --   Output 1300 ml   Net -1300 ml         Physical Exam      Clear lungs bilaterally, unlabored breathing, on room air, no cyanosis   Heart sounds indicate a regular rate and rhythm  Awake alert, no acute distress   No facial droop, no slurred speech   No acute obvious upper nor lower extremity edema   5/5 proximal upper and lower extremity strength bilaterally including fist  and hip flexor strength   No obvious bruising noted on lower extremities

## 2024-08-19 NOTE — PROGRESS NOTES
Yossi Vargas - Neurosurgery ([facility])  [facility] Medicine  Progress Note    Patient Name: Shaye Clemente  MRN: [id number]  Patient Class: IP- Inpatient   Admission Date: 8/6/2024  Length of Stay: 13 days  Attending Physician: Jame Tiwari MD  Primary Care Provider: Dottie Zuñiga NP-C        Subjective:     Principal Problem:Seizure-like activity        HPI:  84-year-old female with a left hip hemiarthroplasty in June (by report had hemiarthroplasty of closed comminuted femoral neck hip fracture), paroxysmal atrial fibrillation (on Xarelto), hypothyroidism, dementia, and hyperlipidemia admitted to [facility] on August 4 with low hemoglobin.  Patient reported tarry stools for several days, but Hemoccult was negative in the emergency department.  She also noted discomfort and swelling around her left hip.  Labs in the emergency department were concerning for anemia with hemoglobin 6.5.  There was also concern for possible cystitis, and she was placed on ciprofloxacin.  CT of the head had no acute abnormalities, and CT of the left hip showed soft tissue fullness and fluid in the deep left pelvis and presacral region.  Initial EKG showed sinus rhythm.  She was transfused 2 units packed red blood cells and admitted to Hospital Medicine.  With transfusion, hemoglobin increased to 9.3.  She was confused during her stay.  She was taken for CT of the abdomen and pelvis on the morning of August 5 when she began to have seizure activity.  She had fluttering of her eyes and movement of her head along with dorsiflexion of both feet.  She was given Keppra and placed on twice daily Keppra.  MRI brain had no acute intracranial abnormality.     She underwent Neurology tele-consultation, and recommendations included additional Keppra along with Q 4 hour neuro checks and routine EEG.  She was lethargic and required repeated stimulation for her to participate during examination.  She was oriented to person and time.  She was  able to follow simple commands.  Mentation appears to be slowly improving.      She underwent noncontrasted CT of the abdomen and pelvis that showed a subcutaneous fluid collection overlying the left hip which may represent resolving hematoma or postsurgical seroma.  There were also findings suspicious for intramuscular hematoma in the left iliopsoas, psoas, and iliacus.  Transfer center spoke with Orthopedic Surgery at [facility].  At present, there does not appear to be surgical intervention indicated in the left hip.  Case also discussed with Interventional Radiology at [facility].  Monitor the trend in hemoglobin and hemodynamic status.  If she remains stable, continue monitoring.  If she has a significant drop in hemoglobin or show signs of hemodynamic instability, she may need CTA of the abdomen and pelvis (bleed protocol) to determine if IR intervention is indicated. Pt. Transferred toHospital Medicine at [facility] in step-down status for EEG and Neurology evaluation of new onset seizures as well as monitoring the trend in her hemoglobin.     August 5:  Sodium 135, potassium 3.2, chloride 104, CO2 23, BUN 12, creatinine 1.7, glucose 84, calcium 8.4, magnesium 1.4, phosphorus 3.2, AST 20, ALT, white blood cells 7.13, hemoglobin 9.3, hematocrit 27.9, platelets 242  -repeat CBC on the afternoon of August 5 had hemoglobin 10.7 and hematocrit 32.7 (improved from earlier).  -CT abdomen and pelvis without contrast noted small hypoattenuating nodule in the right hepatic lobe may represent a cyst.  Diverticulosis.  Recent right total left hip arthroplasty with poorly defined subcutaneous fluid collection overlying the left hip which may represent resolving hematoma or postsurgical seroma.  Postsurgical abscess not excludable.  Findings suspicious for intramuscular hematoma of the left iliopsoas, psoas, and iliacus muscles with potential active hemorrhage.  -MRI brain was motion limited.  No acute  intracranial abnormality.  Cortical atrophy with periventricular deep white matter change consistent with chronic small-vessel ischemic disease.     August 4: Blood cultures with no growth to date, INR 1.8, iron saturation 19%, stool for occult blood negative, lactic acid 0.7, hemoglobin 6.7, hematocrit 20.5, procalcitonin 0.26  -urinalysis with 2+ protein, trace blood, trace leukocytes, 4 RBC, 8 WBC, many bacteria  -chest x-ray had no acute cardiopulmonary process identified  -CT head had no hemorrhage or other acute intracranial CT findings.  -CT left hip showed nonspecific soft tissue fullness to lateral hip measuring 9.4 x 8.7 x 4.8 cm.  Partially imaged slightly hyperattenuating fluid in the left deep pelvis and presacral region which may represent blood products.  Postsurgical changes of left hip arthroplasty.  -EKG showed normal sinus rhythm and appeared to be fairly normal.    Overview/Hospital Course:  Patient maintained stable hemoglobin since admission to [facility].  Working well with speech therapy, tolerating bite size soft diet.  TSH noted to be markedly elevated up to 19, started on Synthroid.  IR unable to aspirate any fluid from left hip. Pt's CRP had markedly reduced while on carbapenem; ID felt acute osteomyelitis was possibly present.    Interval History:   Patient complaining of left leg pain that started sometimes since last night.  Lower extremity ultrasound negative for DVT.  CPK unremarkable.  No chest pain reported or headache reported.  Afebrile. PICC line placed.     Review of Systems  Objective:     Vital Signs (Most Recent):  Temp: 98 °F (36.7 °C) (08/19/24 1312)  Pulse: 62 (08/19/24 1312)  Resp: 18 (08/19/24 1312)  BP: 134/64 (08/19/24 1312)  SpO2: 96 % (08/19/24 1312) Vital Signs (24h Range):  Temp:  [97.4 °F (36.3 °C)-98 °F (36.7 °C)] 98 °F (36.7 °C)  Pulse:  [59-69] 62  Resp:  [16-19] 18  SpO2:  [94 %-97 %] 96 %  BP: (134-156)/(60-68) 134/64     Weight: 81.2 kg (179 lb 0.2  oz)  Body mass index is 28.04 kg/m².    Intake/Output Summary (Last 24 hours) at 8/19/2024 1557  Last data filed at 8/19/2024 0538  Gross per 24 hour   Intake --   Output 1300 ml   Net -1300 ml         Physical Exam      Clear lungs bilaterally, unlabored breathing, on room air, no cyanosis   Heart sounds indicate a regular rate and rhythm  Awake alert, no acute distress   No facial droop, no slurred speech   No acute obvious upper nor lower extremity edema   5/5 proximal upper and lower extremity strength bilaterally including fist  and hip flexor strength   No obvious bruising noted on lower extremities        Assessment/Plan:      * Seizure-like activity  -Reported seizure at [facility] prior to transfer pt.   -mentation back at baseline  -MRI brain uremarkable  -EEG  neg for seizure activity   - continue Keppra 500 BID  -seizure precautions, fall precautions   -neuro checks      Hip hematoma, left  -Pt. Presented anemia, required 1 unit pRBC, Hgb now stable ~10. CT abdomen conerning for subcutaneous fluid collection overlying the left hip may represent resolving hematoma or postsurgical seroma andFindings suspicious for an intramuscular hematoma of the left iliopsoas, psoas and iliacus muscles with potential active hemorrhage.  -conservative management per Orthopedics Interventional Radiology at this time   -stable hemoglobin   -no successful aspiration from left hip via IR procedure on 08/14  -bone scan shows postoperative changes of left hip arthroplasty pitted diffuse increase in the soft tissue and delayed uptake around the arthroplasty due to postoperative changes and inflammatory process.   -on merrem per ID; with reducing CRP ID concerned about possible acute osteo          Hypertension  Pressures noted to be in the 180s systolic   Continue home Toprol   Follow up with pharmacy and family regarding home medication list    Paroxysmal atrial fibrillation  -Hx of pAfib. EKG on admit showed NSR,  rhythm currently regular  -Continue home metoprolol and monitor on telemetry.  Holding Xarelto due to bleeding concerns-this was affirmed by the daughter via phone  -s/p ablation per sister; see's outside cardiologist    Delirium  Intermittent.       Left hip pain  LE [facility] neg for DVT  Orthopedics notified about pain; pending further recs      Antibiotic long-term use  Currently on Meropenem per ID recs.       Chronic anticoagulation  On xarelto at home      Dementia  Neuro checks   Fall precautions   Seizure precautions  Memantine       Hypothyroidism  Elevated TSH on presentation near 19  Started on Synthroid 88 mcg         VTE Risk Mitigation (From admission, onward)           Ordered     Place sequential compression device  Until discontinued         08/06/24 1811                    Discharge Planning   KATI: 8/20/2024     Code Status: DNR   Is the patient medically ready for discharge?:     Reason for patient still in hospital (select all that apply): Patient trending condition, Treatment, and Consult recommendations  Discharge Plan A: Skilled Nursing Facility   Discharge Delays: (!) Change in Medical Condition              Jame Tiwari MD  Department of Hospital Medicine   [facility] - Neurosurgery ([facility])     There are no Wet Read(s) to document.

## 2024-08-19 NOTE — PT/OT/SLP PROGRESS
Occupational Therapy   Co-Treatment    Name: Shaye Clemente  MRN: [id number]  Admitting Diagnosis:  Seizure-like activity       Recommendations:     Discharge Recommendations: Moderate Intensity Therapy  Discharge Equipment Recommendations:  walker, rolling, shower chair, grab bar, bedside commode, hospital bed  Barriers to discharge:  Other (Comment) (increased skilled assist required)    Assessment:     Shaye Clemente is a 84 y.o. female with a medical diagnosis of Seizure-like activity.  She presents with the following performance deficits affecting function: weakness, impaired endurance, impaired self care skills, impaired functional mobility, gait instability, impaired balance, impaired cognition, decreased coordination, decreased lower extremity function, decreased safety awareness, pain, orthopedic precautions. Pt agreeable to session. Pt presenting to session with poor trunk control in sitting EOB, generalized weakness, and increased pain levels which are limiting her optimal participation in desired occupations and ADL routines. However, pt demonstrating increased activity tolerance this session as compared to previous sessions as indicated by participation in functional mobility, decreased assistance levels needed for bed mobility and functional transfers. Pt would continue to benefit from skilled OT services in the acute care setting to improve activity tolerance and functional endurance, increase participation in self-care routines, and promote functional independence needed to return to PLOF and least restrictive home environment.     Rehab Prognosis:  Good; patient would benefit from acute skilled OT services to address these deficits and reach maximum level of function.       Plan:     Patient to be seen 4 x/week to address the above listed problems via self-care/home management, therapeutic activities, therapeutic exercises, neuromuscular re-education  Plan of Care Expires: 08/19/24  Plan of Care Reviewed  "with: patient    Subjective     Chief Complaint: pain in back and L hip  Patient/Family Comments/goals: "I want to go home. My cats miss me."  Pain/Comfort:  Pain Rating 1: 7/10  Location - Side 1: Bilateral  Location - Orientation 1: generalized  Location 1: back  Pain Addressed 1: Reposition, Distraction, Cessation of Activity    Objective:     Communicated with: Nursing prior to session.  Patient found supine with bed alarm, PureWick upon OT entry to room.    General Precautions: Standard, contact, fall, seizure    Orthopedic Precautions:LLE weight bearing as tolerated  Braces: N/A  Respiratory Status: Room air     Occupational Performance:     Bed Mobility:    Patient completed Rolling/Turning to Left with  moderate assistance  Patient completed Scooting/Bridging with stand by assistance  Patient completed Supine to Sit with moderate assistance and 2 persons     Functional Mobility/Transfers:  Patient completed Sit <> Stand Transfer with contact guard assistance  with  hand-held assist   Patient completed Bed <> Chair Transfer using Stand Pivot technique with minimum assistance with hand-held assist  Functional Mobility: Pt able to sit EOB for ~10 minutes with min assist provided secondary to R posterolateral lean. Pt progressing to CGA EOB after verbal cueing to WB through BUEs to compensate for poor trunk control. Pt able to ambulate ~4 ft forward and backward with min assist and B HHA - verbal cueing provided for sequencing of task. Pt requiring increased assist for second STS from EOB secondary to increased pain levels in L hip and increased fatigue after functional mobility.     Activities of Daily Living:  Grooming: contact guard assistance for washing face with warm wash cloth in sitting EOB  Upper Body Dressing: minimum assistance for donning hospital gown around backside  Lower Body Dressing: maximal assistance for donning hospital socks prior to ambulation  Toileting: dependence Pure Wick      [facility] 6 " Click ADL: 16    Treatment & Education:  Provided education on the role of OT, POC, and therapy goals while in the acute care setting. Provided education on the importance of continued mobilization and participation in OOB activities to increase functional endurance and activity tolerance for increased participation in ADL routines. Provided education on safe transfer techniques and proper hand placement to promote safety awareness and prevent falls with functional transfers. All questions within the scope of OT answered, and pt verbalized understanding.     Co-treatment performed to appropriately and safely assess patient's strength, endurance, functional mobility, and ADL performance while facilitating functional tasks in addition to accommodating for patient's activity tolerance and medical acuity.    Patient left up in chair with all lines intact and call button in reach    GOALS:   Multidisciplinary Problems       Occupational Therapy Goals          Problem: Occupational Therapy    Goal Priority Disciplines Outcome Interventions   Occupational Therapy Goal     OT, PT/OT Progressing    Description: Goals to be met by: 09/07/2024     Patient will increase functional independence with ADLs by performing:    LE Dressing with Minimal Assistance  to lucie socks  Grooming while standing at sink with Contact Guard Assistance.  Toileting from toilet with Contact Guard Assistance for hygiene and clothing management.   Step transfer with Contact Guard Assistance  Toilet transfer to toilet with Contact Guard Assistance.                         Time Tracking:     OT Date of Treatment: 08/19/24  OT Start Time: 0959  OT Stop Time: 1024  OT Total Time (min): 25 min    Billable Minutes:Self Care/Home Management 10 minutes  Neuromuscular Re-education 15 minutes    OT/THOM: OT          8/19/2024

## 2024-08-20 VITALS
SYSTOLIC BLOOD PRESSURE: 149 MMHG | OXYGEN SATURATION: 97 % | WEIGHT: 179 LBS | HEIGHT: 67 IN | HEART RATE: 61 BPM | DIASTOLIC BLOOD PRESSURE: 65 MMHG | TEMPERATURE: 97 F | BODY MASS INDEX: 28.09 KG/M2 | RESPIRATION RATE: 20 BRPM

## 2024-08-20 LAB
ALBUMIN SERPL BCP-MCNC: 2.2 G/DL (ref 3.5–5.2)
ALP SERPL-CCNC: 162 U/L (ref 55–135)
ALT SERPL W/O P-5'-P-CCNC: 16 U/L (ref 10–44)
ANION GAP SERPL CALC-SCNC: 3 MMOL/L (ref 8–16)
AST SERPL-CCNC: 41 U/L (ref 10–40)
BASOPHILS # BLD AUTO: 0.05 K/UL (ref 0–0.2)
BASOPHILS NFR BLD: 0.6 % (ref 0–1.9)
BILIRUB SERPL-MCNC: 0.5 MG/DL (ref 0.1–1)
BUN SERPL-MCNC: 15 MG/DL (ref 8–23)
CALCIUM SERPL-MCNC: 8.4 MG/DL (ref 8.7–10.5)
CHLORIDE SERPL-SCNC: 106 MMOL/L (ref 95–110)
CO2 SERPL-SCNC: 25 MMOL/L (ref 23–29)
CREAT SERPL-MCNC: 1.1 MG/DL (ref 0.5–1.4)
DIFFERENTIAL METHOD BLD: ABNORMAL
EOSINOPHIL # BLD AUTO: 1.5 K/UL (ref 0–0.5)
EOSINOPHIL NFR BLD: 17.3 % (ref 0–8)
ERYTHROCYTE [DISTWIDTH] IN BLOOD BY AUTOMATED COUNT: 20.8 % (ref 11.5–14.5)
EST. GFR  (NO RACE VARIABLE): 49.9 ML/MIN/1.73 M^2
GLUCOSE SERPL-MCNC: 98 MG/DL (ref 70–110)
HCT VFR BLD AUTO: 25.6 % (ref 37–48.5)
HGB BLD-MCNC: 8.4 G/DL (ref 12–16)
IMM GRANULOCYTES # BLD AUTO: 0.08 K/UL (ref 0–0.04)
IMM GRANULOCYTES NFR BLD AUTO: 0.9 % (ref 0–0.5)
LYMPHOCYTES # BLD AUTO: 1.8 K/UL (ref 1–4.8)
LYMPHOCYTES NFR BLD: 20.7 % (ref 18–48)
MCH RBC QN AUTO: 31.9 PG (ref 27–31)
MCHC RBC AUTO-ENTMCNC: 32.8 G/DL (ref 32–36)
MCV RBC AUTO: 97 FL (ref 82–98)
MONOCYTES # BLD AUTO: 1 K/UL (ref 0.3–1)
MONOCYTES NFR BLD: 11.7 % (ref 4–15)
NEUTROPHILS # BLD AUTO: 4.1 K/UL (ref 1.8–7.7)
NEUTROPHILS NFR BLD: 48.8 % (ref 38–73)
NRBC BLD-RTO: 0 /100 WBC
PLATELET # BLD AUTO: 203 K/UL (ref 150–450)
PMV BLD AUTO: 11.5 FL (ref 9.2–12.9)
POCT GLUCOSE: 116 MG/DL (ref 70–110)
POCT GLUCOSE: 97 MG/DL (ref 70–110)
POTASSIUM SERPL-SCNC: 3.7 MMOL/L (ref 3.5–5.1)
PROT SERPL-MCNC: 7.9 G/DL (ref 6–8.4)
RBC # BLD AUTO: 2.63 M/UL (ref 4–5.4)
SODIUM SERPL-SCNC: 134 MMOL/L (ref 136–145)
WBC # BLD AUTO: 8.45 K/UL (ref 3.9–12.7)

## 2024-08-20 PROCEDURE — 25000003 PHARM REV CODE 250: Performed by: HOSPITALIST

## 2024-08-20 PROCEDURE — 36415 COLL VENOUS BLD VENIPUNCTURE: CPT | Performed by: HOSPITALIST

## 2024-08-20 PROCEDURE — 85025 COMPLETE CBC W/AUTO DIFF WBC: CPT | Performed by: HOSPITALIST

## 2024-08-20 PROCEDURE — 80053 COMPREHEN METABOLIC PANEL: CPT | Performed by: HOSPITALIST

## 2024-08-20 PROCEDURE — 97530 THERAPEUTIC ACTIVITIES: CPT | Mod: CQ

## 2024-08-20 PROCEDURE — 25000003 PHARM REV CODE 250: Performed by: STUDENT IN AN ORGANIZED HEALTH CARE EDUCATION/TRAINING PROGRAM

## 2024-08-20 PROCEDURE — 97116 GAIT TRAINING THERAPY: CPT | Mod: CQ

## 2024-08-20 PROCEDURE — A4216 STERILE WATER/SALINE, 10 ML: HCPCS | Performed by: HOSPITALIST

## 2024-08-20 PROCEDURE — 63600175 PHARM REV CODE 636 W HCPCS: Performed by: STUDENT IN AN ORGANIZED HEALTH CARE EDUCATION/TRAINING PROGRAM

## 2024-08-20 PROCEDURE — 97535 SELF CARE MNGMENT TRAINING: CPT

## 2024-08-20 RX ORDER — LEVOTHYROXINE SODIUM 75 UG/1
75 TABLET ORAL
Start: 2024-08-21 | End: 2025-08-21

## 2024-08-20 RX ORDER — CARVEDILOL 12.5 MG/1
12.5 TABLET ORAL 2 TIMES DAILY
Start: 2024-08-20 | End: 2025-08-20

## 2024-08-20 RX ORDER — LEVETIRACETAM 500 MG/1
500 TABLET ORAL 2 TIMES DAILY
Start: 2024-08-20 | End: 2025-08-20

## 2024-08-20 RX ORDER — HYDROCODONE BITARTRATE AND ACETAMINOPHEN 5; 325 MG/1; MG/1
1 TABLET ORAL EVERY 6 HOURS PRN
Qty: 21 TABLET | Refills: 0 | Status: SHIPPED | OUTPATIENT
Start: 2024-08-20

## 2024-08-20 RX ORDER — HYDRALAZINE HYDROCHLORIDE 25 MG/1
25 TABLET, FILM COATED ORAL EVERY 8 HOURS PRN
Start: 2024-08-20 | End: 2025-08-20

## 2024-08-20 RX ADMIN — MEMANTINE 5 MG: 5 TABLET ORAL at 09:08

## 2024-08-20 RX ADMIN — DOCUSATE SODIUM 100 MG: 100 CAPSULE, LIQUID FILLED ORAL at 09:08

## 2024-08-20 RX ADMIN — HYDROCODONE BITARTRATE AND ACETAMINOPHEN 1 TABLET: 5; 325 TABLET ORAL at 10:08

## 2024-08-20 RX ADMIN — Medication 10 ML: at 07:08

## 2024-08-20 RX ADMIN — Medication 10 ML: at 12:08

## 2024-08-20 RX ADMIN — LEVETIRACETAM 500 MG: 500 TABLET, FILM COATED ORAL at 09:08

## 2024-08-20 RX ADMIN — LEVOTHYROXINE SODIUM 25 MCG: 25 TABLET ORAL at 07:08

## 2024-08-20 RX ADMIN — CARVEDILOL 12.5 MG: 12.5 TABLET, FILM COATED ORAL at 09:08

## 2024-08-20 RX ADMIN — FERROUS SULFATE TAB EC 325 MG (65 MG FE EQUIVALENT) 1 EACH: 325 (65 FE) TABLET DELAYED RESPONSE at 09:08

## 2024-08-20 RX ADMIN — MEROPENEM 2 G: 2 INJECTION, POWDER, FOR SOLUTION INTRAVENOUS at 10:08

## 2024-08-20 NOTE — PT/OT/SLP PROGRESS
"Physical Therapy Treatment  Co Treatment with Occupational Therapy    Patient Name:  Shaye Clemente   MRN:  [id number]    Recommendations:     Discharge Recommendations: Moderate Intensity Therapy  Discharge Equipment Recommendations: hospital bed, wheelchair, lift device  Barriers to discharge: Decreased caregiver support and Increased level of assistance required    Assessment:     Shaye Clemente is a 84 y.o. female admitted with a medical diagnosis of Seizure-like activity.  She presents with the following impairments/functional limitations: weakness, impaired endurance, impaired self care skills, impaired functional mobility, gait instability, impaired balance, impaired cognition, decreased coordination, decreased lower extremity function, decreased safety awareness, orthopedic precautions, pain.    Pt met with HOB elevated and agreeable to session. Pt tolerates session well with emphasis on bed mobility, transfers, and gait training. Pt making progress towards therapy goals as indicated by decreased assistance required with bed mobility and transfers. Pt tolerates multiple sit <> stand transfers for mg care and change of pure wick brief with brief sitting breaks between stands. Pt will continue to benefit from skilled therapy services.    Rehab Prognosis: Good; patient would benefit from acute skilled PT services to address these deficits and reach maximum level of function.    Recent Surgery: * No surgery found *      Plan:     During this hospitalization, patient to be seen 4 x/week to address the identified rehab impairments via gait training, therapeutic activities, therapeutic exercises, neuromuscular re-education and progress toward the following goals:    Plan of Care Expires:  09/07/24    Subjective     Chief Complaint: 8/10 back pain and unrated L hip pain  Patient/Family Comments/goals: "I want to go home, when will I go home today?"  Pain/Comfort:  Pain Rating 1: 8/10  Location - Side 1: " Bilateral  Location - Orientation 1: generalized  Location 1: back  Pain Addressed 1: Reposition, Distraction  Pain Rating Post-Intervention 1: 8/10  Pain Rating 2: other (see comments) (pain not rated)  Location - Side 2: Left  Location - Orientation 2: proximal  Location 2: hip  Pain Addressed 2: Reposition, Distraction  Pain Rating Post-Intervention 2: other (see comments) (pain not rated)      Objective:     Communicated with RN (Nikole) prior to session.  Patient found HOB elevated with bed alarm, PureWick (teleeGamesitter) upon PTA entry to room.     General Precautions: Standard, contact, fall  Orthopedic Precautions: LLE weight bearing as tolerated  Braces: N/A  Respiratory Status: Room air    Cognition:   Orientation:  Affect: pleasant and cooperative  Alertness: eyes open 100 % of session  Insight: good insight into deficits  Attention: attends to task  Command Following: patient follows 100 % of 1-step commands  Communication:  Sequencing: Intact    Functional Mobility:  Bed Mobility:     Rolling Left:  minimum assistance with use of bedrail  Verbal and tactile cues required to reach for bedrail  Supine to Sit: Minimum Assistance x1 persons for Trunk/BLEs  Transfers:     Sit to Stand:  x5 trials from EOB minimum assistance x2 persons  with no AD and SIMIN HHA  Bed to Chair: minimum assistance x2 persons with no AD and SIMIN HHA via step transfer  Balance:  Sitting Balance at EOB:  Level of Assist Required: Stand-by Assistance  Deviations noted: 1 brief moment or R lateral lean to offset L hip  Pt able to correct lean with verbal cues  Total Time Sitting EOB: 10 minutes    Static Standing:  Level of Assist Required: Minimal Assistance x2 persons  Deviations noted: cervical flexion, forward flexed posture, decreased hip extension  Verbal and tactile cues provided to facilitate cervical flexion, upright posture, scapular retraction and hip extension   Total Time Sitting EOB: ~45 seconds each stand  Gait:   Pt  ambulates ~2 feet to bedside chair with minimum assistance x2 persons  and  SIMIN HHA  Deviations noted: flexed posture, decreased step length, decreased jus, decreased gait speed, decreased SIMIN foot clearance  All lines remained intact and gait belt utilized.      AM-PAC 6 CLICK MOBILITY  Turning over in bed (including adjusting bedclothes, sheets and blankets)?: 3  Sitting down on and standing up from a chair with arms (e.g., wheelchair, bedside commode, etc.): 3  Moving from lying on back to sitting on the side of the bed?: 3  Moving to and from a bed to a chair (including a wheelchair)?: 3  Need to walk in hospital room?: 3  Climbing 3-5 steps with a railing?: 1  Basic Mobility Total Score: 16       Treatment & Education:  Patient provided with daily orientation and goals of this PT session.     Pt educated to call for assistance and to transfer with hospital staff only.  Also, pt was educated on the effects of prolonged immobility and the importance of performing OOB activity and exercises to promote healing and reduce recovery time.    Co tx performed with OT due to patient need for 2 skilled therapist to ensure patient and staff safety and to accommondate for activity tolerance.    Patient left up in chair with all lines intact, call button in reach, chair alarm on, and RN notified.    GOALS:   Multidisciplinary Problems       Physical Therapy Goals          Problem: Physical Therapy    Goal Priority Disciplines Outcome Goal Variances Interventions   Physical Therapy Goal     PT, PT/OT Progressing     Description: Goals to be met by: 24     Patient will increase functional independence with mobility by performin. Supine to sit with MInimal Assistance  2. Sit to supine with MInimal Assistance  3. Sit to stand transfer with Minimal Assistance  4. Bed to chair transfer with Minimal Assistance using LRAD as needed  5. Gait  x 150 feet with Minimal Assistance using LRAD as needed.   6. Ascend/descend  3 stair with no Handrails and Minimal Assistance  7. Lower extremity exercise program x15 reps per handout, with assistance as needed    Hospital Bed - Patient requires a hospital bed for positioning of the body in ways that are not feasible with an ordinary bed. The patient requires special positioning for pain relief, limited mobility, and/or being unable to independently make changes in body position without the use of a hospital bed. Pillows and wedges will not be adequate for resolving these positional issues.     Wheelchair - Patient has a mobility limitation that significantly impairs their ability to participate in one or more mobility related activities of daily living in customary locations in the home. The mobility limitation cannot be sufficiently resolved by the use of a cane or walker. The use of a manual wheelchair will greatly improve the patient's ability to participate in MRADLs. The patient will use the wheelchair on a regular basis at home. They have expressed their willingness to use a manual wheelchair in the home, and have a caregiver who is available and willing to assist with the wheelchair if needed.                         Time Tracking:     PT Received On: 08/20/24  PT Start Time: 0917     PT Stop Time: 0948  PT Total Time (min): 31 min     Billable Minutes: Gait Training 15 and Therapeutic Activity 16    Treatment Type: Treatment  PT/PTA: PTA     Number of PTA visits since last PT visit: 2     08/20/2024

## 2024-08-20 NOTE — PROGRESS NOTES
[facility] - Neurosurgery ([facility])  Orthopedics  Progress Note    Patient Name: Shaye Clemente  MRN: [id number]  Admission Date: 8/6/2024  Hospital Length of Stay: 13 days  Attending Provider: Jame Tiwari MD  Primary Care Provider: Dottie Zuñiga NP-C    Subjective:     Principal Problem:Seizure-like activity    Principal Orthopedic Problem: Left hip pain s./p hemiarthroplasty 06/4/24 in [facility].    Interval History: Pt seen and examined at bedside. MERE VILLANUEVA. She reports improved him pain both at rest and with weightbearing. She was able to participate in PT with up to 8ft of ambulation today. She expressed that she would like to go home.      Review of patient's allergies indicates:   Allergen Reactions    Prilosec [omeprazole]     Augmentin [amoxicillin-pot clavulanate] Nausea And Vomiting    Codeine Rash    Sulfa (sulfonamide antibiotics) Rash       Current Facility-Administered Medications   Medication    acetaminophen tablet 650 mg    albuterol-ipratropium 2.5 mg-0.5 mg/3 mL nebulizer solution 3 mL    aluminum-magnesium hydroxide-simethicone 200-200-20 mg/5 mL suspension 30 mL    atorvastatin tablet 40 mg    calcium carbonate 200 mg calcium (500 mg) chewable tablet 500 mg    carvediloL tablet 12.5 mg    docusate sodium capsule 100 mg    ferrous sulfate tablet 1 each    glucagon (human recombinant) injection 1 mg    hydrALAZINE tablet 25 mg    HYDROcodone-acetaminophen 5-325 mg per tablet 1 tablet    levETIRAcetam tablet 500 mg    levothyroxine tablet 25 mcg    LORazepam injection 2 mg    melatonin tablet 6 mg    memantine tablet 5 mg    meropenem 2 g in 0.9% NaCl 100 mL IVPB (MB+)    montelukast tablet 10 mg    naloxone 0.4 mg/mL injection 0.02 mg    ondansetron injection 4 mg    polyethylene glycol packet 17 g    prochlorperazine injection Soln 5 mg    sodium chloride 0.9% flush 10 mL    And    sodium chloride 0.9% flush 10 mL     Objective:     Vital Signs (Most Recent):  Temp: 98.1 °F (36.7  "°C) (08/19/24 1933)  Pulse: 69 (08/19/24 1933)  Resp: 18 (08/19/24 2022)  BP: (!) 149/66 (08/19/24 1933)  SpO2: 96 % (08/19/24 1933) Vital Signs (24h Range):  Temp:  [97.4 °F (36.3 °C)-98.1 °F (36.7 °C)] 98.1 °F (36.7 °C)  Pulse:  [59-69] 69  Resp:  [16-19] 18  SpO2:  [94 %-97 %] 96 %  BP: (134-156)/(64-73) 149/66     Weight: 81.2 kg (179 lb 0.2 oz)  Height: 5' 7" (170.2 cm)  Body mass index is 28.04 kg/m².      Intake/Output Summary (Last 24 hours) at 8/19/2024 2113  Last data filed at 8/19/2024 1847  Gross per 24 hour   Intake --   Output 1150 ml   Net -1150 ml        Ortho/SPM Exam     AAOx4  NAD  Reg rate  No increased WOB    LLE  Compartments soft/compressible  SILT throughout  AROM of toes, ankle, and knee intact.  WWP Brisk cap refill  Negative stinchfields test  Negative logroll   No TTP around the hip        Significant Labs: All pertinent labs within the past 24 hours have been reviewed.    Significant Imaging: I have reviewed and interpreted all pertinent imaging results/findings.  Assessment/Plan:     Left hip pain  Shaye Clemente is a 84 y.o. female presenting with left hip pain 2 months status post left hip maite arthroplasty at [facility].  She developed bacteremia and surgical site infection and had a and I and D of left hip 6 weeks ago. Records of surgery and treatment from previous hospital was not available for review. She continues to say endorsed left hip pain with rolling in bed.  She has not been able to put weight on the hip.  She is currently admitted for workup of seizures.  Imaging revealed presence of subcutaneous fluid over the lateral proximal thigh and possible hematoma collection around the iliopsoas tendon.  Inflammatory markers are elevated. IR aspiration resulted in dry tap. Pt has improved clinically from orthopedic perspective. We recommend continuing physical therapy and WBAT on LLE.  Rec f/u with primary surgeon in [place].    - Antibiotics:  Per ID  - WBAT LLE  - " Pain control: multimodal pain management  - PT/OT:  Recommend weightbear as tolerated  - No further orthopedic intervention anticipated.             Nba Contreras MD  Orthopedics  [facility]argas - Neurosurgery ([facility])

## 2024-08-20 NOTE — SUBJECTIVE & OBJECTIVE
Principal Problem:Seizure-like activity    Principal Orthopedic Problem: Left hip pain s./p hemiarthroplasty 06/4/24 in [facility].    Interval History: Pt seen and examined at bedside. MERE VILLANUEVA. She reports improved him pain both at rest and with weightbearing. She was able to participate in PT with up to 8ft of ambulation today. She expressed that she would like to go home.      Review of patient's allergies indicates:   Allergen Reactions    Prilosec [omeprazole]     Augmentin [amoxicillin-pot clavulanate] Nausea And Vomiting    Codeine Rash    Sulfa (sulfonamide antibiotics) Rash       Current Facility-Administered Medications   Medication    acetaminophen tablet 650 mg    albuterol-ipratropium 2.5 mg-0.5 mg/3 mL nebulizer solution 3 mL    aluminum-magnesium hydroxide-simethicone 200-200-20 mg/5 mL suspension 30 mL    atorvastatin tablet 40 mg    calcium carbonate 200 mg calcium (500 mg) chewable tablet 500 mg    carvediloL tablet 12.5 mg    docusate sodium capsule 100 mg    ferrous sulfate tablet 1 each    glucagon (human recombinant) injection 1 mg    hydrALAZINE tablet 25 mg    HYDROcodone-acetaminophen 5-325 mg per tablet 1 tablet    levETIRAcetam tablet 500 mg    levothyroxine tablet 25 mcg    LORazepam injection 2 mg    melatonin tablet 6 mg    memantine tablet 5 mg    meropenem 2 g in 0.9% NaCl 100 mL IVPB (MB+)    montelukast tablet 10 mg    naloxone 0.4 mg/mL injection 0.02 mg    ondansetron injection 4 mg    polyethylene glycol packet 17 g    prochlorperazine injection Soln 5 mg    sodium chloride 0.9% flush 10 mL    And    sodium chloride 0.9% flush 10 mL     Objective:     Vital Signs (Most Recent):  Temp: 98.1 °F (36.7 °C) (08/19/24 1933)  Pulse: 69 (08/19/24 1933)  Resp: 18 (08/19/24 2022)  BP: (!) 149/66 (08/19/24 1933)  SpO2: 96 % (08/19/24 1933) Vital Signs (24h Range):  Temp:  [97.4 °F (36.3 °C)-98.1 °F (36.7 °C)] 98.1 °F (36.7 °C)  Pulse:  [59-69] 69  Resp:  [16-19] 18  SpO2:  [94 %-97 %]  "96 %  BP: (134-156)/(64-73) 149/66     Weight: 81.2 kg (179 lb 0.2 oz)  Height: 5' 7" (170.2 cm)  Body mass index is 28.04 kg/m².      Intake/Output Summary (Last 24 hours) at 8/19/2024 2113  Last data filed at 8/19/2024 1847  Gross per 24 hour   Intake --   Output 1150 ml   Net -1150 ml        Ortho/SPM Exam     AAOx4  NAD  Reg rate  No increased WOB    LLE  Compartments soft/compressible  SILT throughout  AROM of toes, ankle, and knee intact.  WWP Brisk cap refill  Negative stinchfields test  Negative logroll   No TTP around the hip        Significant Labs: All pertinent labs within the past 24 hours have been reviewed.    Significant Imaging: I have reviewed and interpreted all pertinent imaging results/findings.  "

## 2024-08-20 NOTE — PLAN OF CARE
[facility]    FACILITY TRANSFER ORDERS      Patient Name: Shaye Clemente  YOB: 1940    PCP: Dottie Zuñiga NP-C   PCP Address: [address] [place] [id number]  PCP Phone Number: [phone]  PCP Fax: [phone]    Encounter Date: 08/20/2024    Admit to: SNF    Vital Signs:  Routine    Diagnoses:   Active Hospital Problems    Diagnosis  POA    *Seizure-like activity [R56.9]  Yes     Priority: 1 - High    Hip hematoma, left [S70.02XA]  Yes     Priority: 3     Hypertension [I10]  Yes     Priority: 5     Paroxysmal atrial fibrillation [I48.0]  Yes     Priority: 5     Osteomyelitis [M86.9]  Yes    Left hip pain [M25.552]  Yes    Delirium [R41.0]  Yes    Wound infection [T14.8XXA, L08.9]  Yes    Antibiotic long-term use [Z79.2]  Not Applicable    Post op infection [T81.40XA]  Yes    Chronic anticoagulation [Z79.01]  Not Applicable     On xarelto      Hypophosphatemia [E83.39]  Yes     Monitor with daily labs  replace      Hyponatremia [E87.1]  Yes     Na 135  Monitor with daily cmp  improving      Hypokalemia [E87.6]  Yes     K 3.2 POA  Monitor with daily cmp  replace      Hypothyroidism [E03.9]  Yes     On synthroid       Dementia [F03.90]  Yes      Resolved Hospital Problems    Diagnosis Date Resolved POA    Acute hypoxemic respiratory failure [J96.01] 08/14/2024 Yes     had an episode of emesis, followed by respiratory distress with tachypnea and hypoxia requiring around 5L NC to maintain sats.       LUIS CARLOS (acute kidney injury) [N17.9] 08/16/2024 Yes       Allergies:  Review of patient's allergies indicates:   Allergen Reactions    Prilosec [omeprazole]     Augmentin [amoxicillin-pot clavulanate] Nausea And Vomiting    Codeine Rash    Sulfa (sulfonamide antibiotics) Rash       Diet: regular diet    Activities: Weight bearing as tolerated    Goals of Care Treatment Preferences:  Code Status: DNR      Nursing: routine; seizure precautions; fall precautions         CONSULTS:     Physical Therapy to evaluate and treat. , Occupational Therapy to evaluate and treat., and  to evaluate for community resources/long-range planning.              Outpatient Antibiotic Therapy Plan:           1) Infection: L Hip      2) Discharge Antibiotics:     Intravenous antibiotics:  Meropenem 2g IV q12h     Oral antibiotics:  None     3) Therapy Duration:  6 weeks     Estimated end date of IV antibiotics: 9/20/24     4) Outpatient Weekly Labs:     Order the following labs to be drawn on Mondays:   CBC  CMP   CRP           5) Fax Lab Results to Infectious Diseases Provider: Montana Ralph PA-C     [facility] Fax Number: [phone]     Infusion Therapy:   SN to perform Infusion Therapy/Central Line Care.  Review Central Line Care & Central Line Flush with patient.    Scrub the Hub: Prior to accessing the line, always perform a 30 second alcohol scrub  Each lumen of the central line is to be flushed at least daily with 10 mL Normal Saline and 3 mL Heparin flush (10 units/mL)  Skilled Nurse (SN) may draw blood from IV access  Blood Draw Procedure:   - Aspirate at least 5 mL of blood   - Discard   - Obtain specimen   - Change injection cap   - Flush with 20 mL Normal Saline followed by a                 3-5 mL Heparin flush (10 units/mL)  Central :   - Sterile dressing changes are done weekly and as needed.   - Use chlor-hexadine scrub to cleanse site, apply Biopatch to insertion site,       apply securement device dressing   - Injection caps are changed weekly and after EVERY lab draw.   - If sterile gauze is under dressing to control oozing,                 dressing change must be performed every 24 hours until gauze is not needed.    Medications: Review discharge medications with patient and family and provide education.         Medication List        START taking these medications      carvediloL 12.5 MG tablet  Commonly known as: COREG  Take 1 tablet (12.5 mg total) by  mouth 2 (two) times daily.     hydrALAZINE 25 MG tablet  Commonly known as: APRESOLINE  Take 1 tablet (25 mg total) by mouth every 8 (eight) hours as needed (Sbp > or equal to 170).     levETIRAcetam 500 MG Tab  Commonly known as: KEPPRA  Take 1 tablet (500 mg total) by mouth 2 (two) times daily.            CHANGE how you take these medications      levothyroxine 75 MCG tablet  Commonly known as: SYNTHROID  Take 1 tablet (75 mcg total) by mouth before breakfast. Administer on an empty stomach at least 30 minutes before food.  Start taking on: August 21, 2024  What changed:   medication strength  how much to take  additional instructions     rivaroxaban 20 mg Tab  Commonly known as: XARELTO  Hold until seen by cardiology  What changed:   how much to take  how to take this  when to take this  additional instructions            CONTINUE taking these medications      albuterol-ipratropium 2.5 mg-0.5 mg/3 mL nebulizer solution  Commonly known as: DUO-NEB  Take 3 mLs by nebulization every 3 (three) hours as needed for Wheezing. Rescue     betamethasone valerate 0.1% 0.1 % Crea  Commonly known as: VALISONE  Apply 0.1 % topically 2 (two) times daily. Apply to posterior trunk/back     CALCIUM CARBONATE ORAL  Take 1 tablet by mouth 2 (two) times daily as needed for Heartburn.     diphenhydrAMINE 25 mg capsule  Commonly known as: BENADRYL  Take 25 mg by mouth every 6 (six) hours as needed for Itching.     docusate sodium 100 MG capsule  Commonly known as: COLACE  Take 100 mg by mouth 2 (two) times daily.     DULoxetine 30 MG capsule  Commonly known as: CYMBALTA  Take 30 mg by mouth 2 (two) times daily.     ferrous sulfate 325 mg (65 mg iron) Tab tablet  Commonly known as: FEOSOL  Take 325 mg by mouth daily with breakfast.     HYDROcodone-acetaminophen 5-325 mg per tablet  Commonly known as: NORCO  Take 1 tablet by mouth every 6 (six) hours as needed for Pain.     ibandronate 150 mg tablet  Commonly known as: BONIVA  Take 150  mg by mouth every 30 days.     memantine 10 MG Tab  Commonly known as: NAMENDA  Take 10 mg by mouth 2 (two) times daily.     montelukast 10 mg tablet  Commonly known as: SINGULAIR  Take 10 mg by mouth every evening.     polyethylene glycol 17 gram/dose powder  Commonly known as: GLYCOLAX  Take 17 g by mouth once daily.     PRO-STAT AWC  gram-kcal/30 mL Liqd  Generic drug: amino acids-protein hydrolys  Take 30 mLs by mouth 2 (two) times a day. For wound healing.     rosuvastatin 10 MG tablet  Commonly known as: CRESTOR  Take 10 mg by mouth once daily.     Saccharomyces boulardii 250 mg capsule  Commonly known as: FLORASTOR  Take 250 mg by mouth 2 (two) times daily.     STIOLTO RESPIMAT 2.5-2.5 mcg/actuation Mist  Generic drug: tiotropium-olodateroL  Inhale 2 puffs into the lungs Daily. Controller            STOP taking these medications      0.9% NaCl PgBk 100 mL with ertapenem 1 gram SolR 1 g     metoprolol succinate 50 mg Cspx                Immunizations Administered as of 8/20/2024       No immunizations on file.        _________________________________  Jame Tiwari MD  08/20/2024

## 2024-08-20 NOTE — PLAN OF CARE
[facility] - Neurosurgery (Hospital)  Discharge Final Note    Primary Care Provider: Dottie Zuñiga NP-C    Expected Discharge Date: 8/20/2024    Final Discharge Note (most recent)       Final Note - 08/20/24 1225          Final Note    Assessment Type Final Discharge Note (P)      Anticipated Discharge Disposition Skilled Nursing Facility (P)         Post-Acute Status    Post-Acute Authorization Placement (P)      Post-Acute Placement Status Set-up Complete/Auth obtained (P)      Discharge Delays Change in Medical Condition (P)                  Discharge back to [facility] in [place].  SW provided RN with number for report and set up ambo transport for 320pm.  SW also notified patient and her sister Adriane of time.      Gladys Peterson, LMSW [facility]  [phone]    Future Appointments   Date Time Provider Department Center   9/10/2024  2:30 PM Bryn Alcantara PA-C [facility]   9/24/2024  1:30 PM Montana Ralph Jr., PA [facility]         Important Message from Medicare  Important Message from Medicare regarding Discharge Appeal Rights: Given to patient/caregiver, Explained to patient/caregiver, Signed/date by patient/caregiver     Date IMM was signed: 08/19/24  Time IMM was signed: 1417    Contact Info       Dottie Zuñiga NP-C   Specialty: Family Medicine   Relationship: PCP - General    [address] [facility] [place] [address]   Phone: [phone]       Next Steps: Schedule an appointment as soon as possible for a visit

## 2024-08-20 NOTE — ASSESSMENT & PLAN NOTE
Shaye Clemente is a 84 y.o. female presenting with left hip pain 2 months status post left hip maite arthroplasty at [facility].  She developed bacteremia and surgical site infection and had a and I and D of left hip 6 weeks ago. Records of surgery and treatment from previous hospital was not available for review. She continues to say endorsed left hip pain with rolling in bed.  She has not been able to put weight on the hip.  She is currently admitted for workup of seizures.  Imaging revealed presence of subcutaneous fluid over the lateral proximal thigh and possible hematoma collection around the iliopsoas tendon.  Inflammatory markers are elevated. IR aspiration resulted in dry tap. Pt has improved clinically from orthopedic perspective. We recommend continuing physical therapy and WBAT on LLE.  Rec f/u with primary surgeon in [place].    - Antibiotics:  Per ID  - WBAT LLE  - Pain control: multimodal pain management  - PT/OT:  Recommend weightbear as tolerated  - No further orthopedic intervention anticipated.

## 2024-08-20 NOTE — PT/OT/SLP PROGRESS
"Occupational Therapy   Co-Treatment    Name: Shaye Clemente  MRN: [id number]  Admitting Diagnosis:  Seizure-like activity       Recommendations:     Discharge Recommendations: Moderate Intensity Therapy  Discharge Equipment Recommendations:  hospital bed, lift device, wheelchair, bedside commode, shower chair  Barriers to discharge:  Other (Comment) (increased skilled A required)    Assessment:     Shaye Clemente is a 84 y.o. female with a medical diagnosis of Seizure-like activity.  She presents with good participation/motivation. Pt with improved static/dynamic sitting balance and activity tolerance for EOB activities today. Pt with impaired standing tolerance, requiring multiple rest breaks between standing toileting tasks.    Performance deficits affecting function are weakness, impaired endurance, impaired self care skills, impaired functional mobility, gait instability, impaired balance, impaired cognition, impaired coordination, decreased upper extremity function, decreased lower extremity function, decreased safety awareness, pain.     Rehab Prognosis:  Good; patient would benefit from acute skilled OT services to address these deficits and reach maximum level of function.       Plan:     Patient to be seen 4 x/week to address the above listed problems via self-care/home management, therapeutic exercises, therapeutic activities, neuromuscular re-education  Plan of Care Expires: 09/07/24  Plan of Care Reviewed with: patient    Subjective     Chief Complaint: needing assistance due to urinary incontinence, pain in R hip/back needing repositioning once in bedside chair  Patient/Family Comments/goals: return to OF, "what time is transport coming to take me home?  Pain/Comfort:  Pain Rating 1: 8/10  Location - Side 1: Bilateral  Location - Orientation 1: generalized  Location 1: back  Pain Addressed 1: Reposition, Distraction    Objective:   Co-treatment performed due to patient's multiple deficits requiring two " skilled therapists to appropriately and safely assess patient's strength, endurance, functional mobility, and ADL performance while facilitating functional tasks in addition to accommodating for patient's activity tolerance and medical acuity.   Communicated with: RN prior to session.  Patient found supine with bed alarm, PureWick upon OT entry to room.    General Precautions: Standard, contact, fall    Orthopedic Precautions:LLE weight bearing as tolerated  Braces: N/A  Respiratory Status: Room air     Occupational Performance:     Bed Mobility:    Patient completed Rolling/Turning to Left with  minimum assistance  Patient completed Supine to Sit with minimum assistance     Functional Mobility/Transfers/EOB:  Patient completed Sit <> Stand Transfer with minimum assistance and of 2 persons  with  hand-held assist   Patient completed Bed <> Chair Transfer using Step Transfer technique with minimum assistance and of 2 persons with hand-held assist  Pt able to sit EOB for total of ~14 mins with SBA      Activities of Daily Living:  Bathing: maximal assistance seated EOB  Upper Body Dressing: moderate assistance to doff/don clean gown seated EOB  Lower Body Dressing: total assistance to doff/don peurewick sitting and standing at EOB   Toileting: moderate assistance for perineal care standing at EOB      [facility] 6 Click ADL: 16    Treatment & Education:  Provided education regarding role of OT, POC, & discharge recommendations with pt  verbalizing understanding.  Pt had no further questions & when asked whether there were any concerns pt reported none.   Whiteboard updated with OT name, safe mobility protocol, and level of assistance required.     Patient left up in chair with all lines intact, call button in reach, and RN notified    GOALS:   Multidisciplinary Problems       Occupational Therapy Goals          Problem: Occupational Therapy    Goal Priority Disciplines Outcome Interventions   Occupational Therapy Goal      OT, PT/OT Progressing    Description: Goals to be met by: 09/07/2024     Patient will increase functional independence with ADLs by performing:    LE Dressing with Minimal Assistance  to lucie socks  Grooming while standing at sink with Contact Guard Assistance.  Toileting from toilet with Contact Guard Assistance for hygiene and clothing management.   Step transfer with Contact Guard Assistance  Toilet transfer to toilet with Contact Guard Assistance.                         Time Tracking:     OT Date of Treatment: 08/20/24  OT Start Time: 0917  OT Stop Time: 0948  OT Total Time (min): 31 min    Billable Minutes:Self Care/Home Management 31    OT/THOM: OT          8/20/2024

## 2024-08-20 NOTE — NURSING
Report given to Eunice nurse receiving patient at [facility]. All questions encouraged and answered.

## 2024-08-21 NOTE — DISCHARGE SUMMARY
Yossi Vargas - Neurosurgery ([facility])  [facility]  Discharge Summary      Patient Name: Shaye Clemente  MRN: [id number]  [place]: [id number]  Patient Class: IP- Inpatient  Admission Date: 8/6/2024  Hospital Length of Stay: 14 days  Discharge Date and Time: 8/20/2024  3:41 PM  Attending Physician: No att. providers found   Discharging Provider: Jame Tiwari MD  Primary Care Provider: Dottie Zuñiga NP-C  [facility] Medicine Team: [facility] Jame Tiwari MD  Primary Care Team: [facility]    HPI:   84-year-old female with a left hip hemiarthroplasty in June (by report had hemiarthroplasty of closed comminuted femoral neck hip fracture), paroxysmal atrial fibrillation (on Xarelto), hypothyroidism, dementia, and hyperlipidemia admitted to [facility] on August 4 with low hemoglobin.  Patient reported tarry stools for several days, but Hemoccult was negative in the emergency department.  She also noted discomfort and swelling around her left hip.  Labs in the emergency department were concerning for anemia with hemoglobin 6.5.  There was also concern for possible cystitis, and she was placed on ciprofloxacin.  CT of the head had no acute abnormalities, and CT of the left hip showed soft tissue fullness and fluid in the deep left pelvis and presacral region.  Initial EKG showed sinus rhythm.  She was transfused 2 units packed red blood cells and admitted to Hospital Medicine.  With transfusion, hemoglobin increased to 9.3.  She was confused during her stay.  She was taken for CT of the abdomen and pelvis on the morning of August 5 when she began to have seizure activity.  She had fluttering of her eyes and movement of her head along with dorsiflexion of both feet.  She was given Keppra and placed on twice daily Keppra.  MRI brain had no acute intracranial abnormality.     She underwent Neurology tele-consultation, and recommendations included additional Keppra along with Q 4 hour neuro checks and  routine EEG.  She was lethargic and required repeated stimulation for her to participate during examination.  She was oriented to person and time.  She was able to follow simple commands.  Mentation appears to be slowly improving.      She underwent noncontrasted CT of the abdomen and pelvis that showed a subcutaneous fluid collection overlying the left hip which may represent resolving hematoma or postsurgical seroma.  There were also findings suspicious for intramuscular hematoma in the left iliopsoas, psoas, and iliacus.  Transfer center spoke with Orthopedic Surgery at [facility].  At present, there does not appear to be surgical intervention indicated in the left hip.  Case also discussed with Interventional Radiology at [facility].  Monitor the trend in hemoglobin and hemodynamic status.  If she remains stable, continue monitoring.  If she has a significant drop in hemoglobin or show signs of hemodynamic instability, she may need CTA of the abdomen and pelvis (bleed protocol) to determine if IR intervention is indicated. Pt. Transferred toHospital Medicine at [facility] in step-down status for EEG and Neurology evaluation of new onset seizures as well as monitoring the trend in her hemoglobin.     August 5:  Sodium 135, potassium 3.2, chloride 104, CO2 23, BUN 12, creatinine 1.7, glucose 84, calcium 8.4, magnesium 1.4, phosphorus 3.2, AST 20, ALT, white blood cells 7.13, hemoglobin 9.3, hematocrit 27.9, platelets 242  -repeat CBC on the afternoon of August 5 had hemoglobin 10.7 and hematocrit 32.7 (improved from earlier).  -CT abdomen and pelvis without contrast noted small hypoattenuating nodule in the right hepatic lobe may represent a cyst.  Diverticulosis.  Recent right total left hip arthroplasty with poorly defined subcutaneous fluid collection overlying the left hip which may represent resolving hematoma or postsurgical seroma.  Postsurgical abscess not excludable.  Findings suspicious  for intramuscular hematoma of the left iliopsoas, psoas, and iliacus muscles with potential active hemorrhage.  -MRI brain was motion limited.  No acute intracranial abnormality.  Cortical atrophy with periventricular deep white matter change consistent with chronic small-vessel ischemic disease.     August 4: Blood cultures with no growth to date, INR 1.8, iron saturation 19%, stool for occult blood negative, lactic acid 0.7, hemoglobin 6.7, hematocrit 20.5, procalcitonin 0.26  -urinalysis with 2+ protein, trace blood, trace leukocytes, 4 RBC, 8 WBC, many bacteria  -chest x-ray had no acute cardiopulmonary process identified  -CT head had no hemorrhage or other acute intracranial CT findings.  -CT left hip showed nonspecific soft tissue fullness to lateral hip measuring 9.4 x 8.7 x 4.8 cm.  Partially imaged slightly hyperattenuating fluid in the left deep pelvis and presacral region which may represent blood products.  Postsurgical changes of left hip arthroplasty.  -EKG showed normal sinus rhythm and appeared to be fairly normal.    * No surgery found *      Hospital Course:   Patient maintained stable hemoglobin since admission to [facility].  Working well with speech therapy, tolerating bite size soft diet.  TSH noted to be markedly elevated up to 19,  Synthroid dosing increased.  Pt's mentation returned to baseline. IR unable to aspirate any fluid from left hip. Pt's CRP had markedly reduced while on carbapenem; ID felt acute osteomyelitis was possibly present. LLE USV was neg for DVT. Orthopedics affirmed conservative management.  Patient had PICC line placed on 08/19 with IV antibiotics course of meropenem arranged per ID recommendations.  Patient has met maximum benefit from hospitalization and is clinically stable for discharge.  Decision was made to keep pt off of her DOAC given hematoma; to f/u cardiology to reassess need for NOAC as well as ID.    Clear lungs bilaterally, unlabored breathing, on room  air, no cyanosis   Heart sounds indicate a regular rate and rhythm  Awake alert, no acute distress   No facial droop, no slurred speech   5/5 proximal upper extremity strength bilaterally   4/5 hip flexor strength bilaterally   No obvious upper nor lower extremity edema            Goals of Care Treatment Preferences:  Code Status: DNR      SDOH Screening:  The patient was unable to be screened for utility difficulties, food insecurity, transport difficulties, housing insecurity, and interpersonal safety, so no concerns could be identified this admission.     Consults:   Consults (From admission, onward)          Status Ordering Provider     [facility]  Once        Provider:  (Not yet assigned)    Completed PAL UNDERWOOD     Inpatient consult to Midline team  Once        Provider:  (Not yet assigned)    Completed CAROLINA DUFFY     Inpatient consult to Interventional Radiology  Once        Provider:  (Not yet assigned)    Completed SETH FENTON     Inpatient consult to Orthopedics  Once        Provider:  (Not yet assigned)    Completed CAROLINA DUFFY     Inpatient consult to Infectious Diseases  Once        Provider:  (Not yet assigned)    Completed CAROLINA DUFFY     Inpatient consult to Midline team  Once        Provider:  (Not yet assigned)    Completed CAROLINA DUFFY     Hospital Medicine PharmD Consult  Once        Provider:  (Not yet assigned)    Completed PAL UNDERWOOD     Inpatient consult to Neurology  Once        Provider:  (Not yet assigned)    Completed ALONA BEEBE            No new Assessment & Plan notes have been filed under this hospital service since the last note was generated.  Service: Hospital Medicine    Final Active Diagnoses:    Diagnosis Date Noted POA    PRINCIPAL PROBLEM:  Seizure-like activity [R56.9] 08/05/2024 Yes    Hip hematoma, left [S70.02XA] 08/06/2024 Yes    Hypertension [I10] 08/07/2024 Yes    Paroxysmal atrial fibrillation  [I48.0] 08/04/2024 Yes    Osteomyelitis [M86.9] 08/19/2024 Yes    Left hip pain [M25.552] 08/13/2024 Yes    Delirium [R41.0] 08/13/2024 Yes    Wound infection [T14.8XXA, L08.9] 08/13/2024 Yes    Antibiotic long-term use [Z79.2] 08/09/2024 Not Applicable    Post op infection [T81.40XA] 08/09/2024 Yes    Chronic anticoagulation [Z79.01] 08/08/2024 Not Applicable    Hypophosphatemia [E83.39] 08/08/2024 Yes    Hyponatremia [E87.1] 08/07/2024 Yes    Hypokalemia [E87.6] 08/07/2024 Yes    Hypothyroidism [E03.9] 08/07/2024 Yes    Dementia [F03.90] 08/07/2024 Yes      Problems Resolved During this Admission:    Diagnosis Date Noted Date Resolved POA    Acute hypoxemic respiratory failure [J96.01] 08/09/2024 08/14/2024 Yes    LUIS CARLOS (acute kidney injury) [N17.9] 08/06/2024 08/16/2024 Yes       Discharged Condition: good    Disposition: Skilled Nursing Facility    Follow Up:   Follow-up Information       Dottie Zuñiga NP-C. Schedule an appointment as soon as possible for a visit.    Specialty: Family Medicine  Contact information:  [address] [place] [id number]  [phone]                           Patient Instructions:      Ambulatory referral/consult to Orthopedics   Standing Status: Future   Referral Priority: Routine Referral Type: Consultation   Requested Specialty: Orthopedic Surgery   Number of Visits Requested: 1       Significant Diagnostic Studies: N/A    Pending Diagnostic Studies:       None           Medications:  Reconciled Home Medications:      Medication List        START taking these medications      0.9% NaCl PgBk 100 mL with meropenem 2 gram SolR 2 g  Inject 2 g into the vein every 12 (twelve) hours.     carvediloL 12.5 MG tablet  Commonly known as: COREG  Take 1 tablet (12.5 mg total) by mouth 2 (two) times daily.     hydrALAZINE 25 MG tablet  Commonly known as: APRESOLINE  Take 1 tablet (25 mg total) by mouth every 8 (eight) hours as needed (Sbp > or equal to 170).     levETIRAcetam 500 MG  Tab  Commonly known as: KEPPRA  Take 1 tablet (500 mg total) by mouth 2 (two) times daily.            CHANGE how you take these medications      levothyroxine 75 MCG tablet  Commonly known as: SYNTHROID  Take 1 tablet (75 mcg total) by mouth before breakfast. Administer on an empty stomach at least 30 minutes before food.  What changed:   medication strength  how much to take  additional instructions     rivaroxaban 20 mg Tab  Commonly known as: XARELTO  Hold until seen by cardiology  What changed:   how much to take  how to take this  when to take this  additional instructions            CONTINUE taking these medications      albuterol-ipratropium 2.5 mg-0.5 mg/3 mL nebulizer solution  Commonly known as: DUO-NEB  Take 3 mLs by nebulization every 3 (three) hours as needed for Wheezing. Rescue     betamethasone valerate 0.1% 0.1 % Crea  Commonly known as: VALISONE  Apply 0.1 % topically 2 (two) times daily. Apply to posterior trunk/back     CALCIUM CARBONATE ORAL  Take 1 tablet by mouth 2 (two) times daily as needed for Heartburn.     diphenhydrAMINE 25 mg capsule  Commonly known as: BENADRYL  Take 25 mg by mouth every 6 (six) hours as needed for Itching.     docusate sodium 100 MG capsule  Commonly known as: COLACE  Take 100 mg by mouth 2 (two) times daily.     DULoxetine 30 MG capsule  Commonly known as: CYMBALTA  Take 30 mg by mouth 2 (two) times daily.     ferrous sulfate 325 mg (65 mg iron) Tab tablet  Commonly known as: FEOSOL  Take 325 mg by mouth daily with breakfast.     HYDROcodone-acetaminophen 5-325 mg per tablet  Commonly known as: NORCO  Take 1 tablet by mouth every 6 (six) hours as needed for Pain.     ibandronate 150 mg tablet  Commonly known as: BONIVA  Take 150 mg by mouth every 30 days.     memantine 10 MG Tab  Commonly known as: NAMENDA  Take 10 mg by mouth 2 (two) times daily.     montelukast 10 mg tablet  Commonly known as: SINGULAIR  Take 10 mg by mouth every evening.     polyethylene glycol 17  gram/dose powder  Commonly known as: GLYCOLAX  Take 17 g by mouth once daily.     PRO-STAT AWC  gram-kcal/30 mL Liqd  Generic drug: amino acids-protein hydrolys  Take 30 mLs by mouth 2 (two) times a day. For wound healing.     rosuvastatin 10 MG tablet  Commonly known as: CRESTOR  Take 10 mg by mouth once daily.     Saccharomyces boulardii 250 mg capsule  Commonly known as: FLORASTOR  Take 250 mg by mouth 2 (two) times daily.     STIOLTO RESPIMAT 2.5-2.5 mcg/actuation Mist  Generic drug: tiotropium-olodateroL  Inhale 2 puffs into the lungs Daily. Controller            STOP taking these medications      0.9% NaCl PgBk 100 mL with ertapenem 1 gram SolR 1 g     metoprolol succinate 50 mg Cspx              Indwelling Lines/Drains at time of discharge:   Lines/Drains/Airways       Peripherally Inserted Central Catheter Line  Duration             PICC Double Lumen 08/19/24 1506 right basilic 2 days                    Time spent on the discharge of patient: 25 minutes         Jame Tiwari MD  Department of Hospital Medicine  [facility] - Neurosurgery ([facility])

## 2024-08-27 NOTE — PHYSICIAN QUERY
Please document your best medical opinion regarding the etiology of Metabolic Encephalopathy.   hypothyroidism

## 2024-08-28 ENCOUNTER — OUTSIDE PLACE OF SERVICE (OUTPATIENT)
Dept: HEPATOLOGY | Facility: CLINIC | Age: 84
End: 2024-08-28

## 2024-08-28 ENCOUNTER — OUTSIDE PLACE OF SERVICE (OUTPATIENT)
Facility: HOSPITAL | Age: 84
End: 2024-08-28

## 2024-08-28 ENCOUNTER — OUTSIDE PLACE OF SERVICE (OUTPATIENT)
Dept: HEPATOLOGY | Facility: CLINIC | Age: 84
End: 2024-08-28
Payer: MEDICARE

## 2024-08-31 ENCOUNTER — HOSPITAL ENCOUNTER (EMERGENCY)
Facility: HOSPITAL | Age: 84
Discharge: HOME | End: 2024-08-31
Attending: STUDENT IN AN ORGANIZED HEALTH CARE EDUCATION/TRAINING PROGRAM
Payer: MEDICARE

## 2024-08-31 VITALS
TEMPERATURE: 98 F | WEIGHT: 147 LBS | BODY MASS INDEX: 25.1 KG/M2 | HEIGHT: 64 IN | SYSTOLIC BLOOD PRESSURE: 183 MMHG | HEART RATE: 64 BPM | OXYGEN SATURATION: 99 % | RESPIRATION RATE: 18 BRPM | DIASTOLIC BLOOD PRESSURE: 87 MMHG

## 2024-08-31 DIAGNOSIS — K76.89 HEPATIC CYST: ICD-10-CM

## 2024-08-31 DIAGNOSIS — R10.13 EPIGASTRIC PAIN: ICD-10-CM

## 2024-08-31 DIAGNOSIS — R10.9 ABDOMINAL PAIN, UNSPECIFIED ABDOMINAL LOCATION: Primary | ICD-10-CM

## 2024-08-31 LAB
ALBUMIN SERPL BCP-MCNC: 2.3 G/DL (ref 3.5–5.2)
ALP SERPL-CCNC: 154 U/L (ref 55–135)
ALT SERPL W/O P-5'-P-CCNC: <5 U/L (ref 10–44)
AMORPH CRY URNS QL MICRO: ABNORMAL
ANION GAP SERPL CALC-SCNC: 7 MMOL/L (ref 8–16)
AST SERPL-CCNC: 20 U/L (ref 10–40)
BACTERIA #/AREA URNS HPF: ABNORMAL /HPF
BASOPHILS # BLD AUTO: 0.03 K/UL (ref 0–0.2)
BASOPHILS NFR BLD: 0.4 % (ref 0–1.9)
BILIRUB SERPL-MCNC: 0.8 MG/DL (ref 0.1–1)
BILIRUB UR QL STRIP: ABNORMAL
BNP SERPL-MCNC: 487 PG/ML (ref 0–99)
BUN SERPL-MCNC: 13 MG/DL (ref 8–23)
CALCIUM SERPL-MCNC: 8.4 MG/DL (ref 8.7–10.5)
CHLORIDE SERPL-SCNC: 102 MMOL/L (ref 95–110)
CLARITY UR: ABNORMAL
CO2 SERPL-SCNC: 23 MMOL/L (ref 23–29)
COLOR UR: YELLOW
CREAT SERPL-MCNC: 1.1 MG/DL (ref 0.5–1.4)
DIFFERENTIAL METHOD BLD: ABNORMAL
EOSINOPHIL # BLD AUTO: 1.4 K/UL (ref 0–0.5)
EOSINOPHIL NFR BLD: 21.5 % (ref 0–8)
ERYTHROCYTE [DISTWIDTH] IN BLOOD BY AUTOMATED COUNT: 21.2 % (ref 11.5–14.5)
EST. GFR  (NO RACE VARIABLE): 49.9 ML/MIN/1.73 M^2
GLUCOSE SERPL-MCNC: 96 MG/DL (ref 70–110)
GLUCOSE UR QL STRIP: NEGATIVE
HCT VFR BLD AUTO: 24.3 % (ref 37–48.5)
HGB BLD-MCNC: 8 G/DL (ref 12–16)
HGB UR QL STRIP: ABNORMAL
HYALINE CASTS #/AREA URNS LPF: 0 /LPF
IMM GRANULOCYTES # BLD AUTO: 0.02 K/UL (ref 0–0.04)
IMM GRANULOCYTES NFR BLD AUTO: 0.3 % (ref 0–0.5)
KETONES UR QL STRIP: ABNORMAL
LACTATE SERPL-SCNC: 0.6 MMOL/L (ref 0.5–2.2)
LEUKOCYTE ESTERASE UR QL STRIP: NEGATIVE
LIPASE SERPL-CCNC: 7 U/L (ref 4–60)
LYMPHOCYTES # BLD AUTO: 1 K/UL (ref 1–4.8)
LYMPHOCYTES NFR BLD: 14.9 % (ref 18–48)
MCH RBC QN AUTO: 31.6 PG (ref 27–31)
MCHC RBC AUTO-ENTMCNC: 32.9 G/DL (ref 32–36)
MCV RBC AUTO: 96 FL (ref 82–98)
MICROSCOPIC COMMENT: ABNORMAL
MONOCYTES # BLD AUTO: 0.6 K/UL (ref 0.3–1)
MONOCYTES NFR BLD: 9.4 % (ref 4–15)
NEUTROPHILS # BLD AUTO: 3.6 K/UL (ref 1.8–7.7)
NEUTROPHILS NFR BLD: 53.5 % (ref 38–73)
NITRITE UR QL STRIP: NEGATIVE
NRBC BLD-RTO: 0 /100 WBC
PH UR STRIP: >8 [PH] (ref 5–8)
PLATELET # BLD AUTO: 204 K/UL (ref 150–450)
PMV BLD AUTO: 9.5 FL (ref 9.2–12.9)
POTASSIUM SERPL-SCNC: 3.7 MMOL/L (ref 3.5–5.1)
PROT SERPL-MCNC: 8.6 G/DL (ref 6–8.4)
PROT UR QL STRIP: ABNORMAL
RBC # BLD AUTO: 2.53 M/UL (ref 4–5.4)
RBC #/AREA URNS HPF: 8 /HPF (ref 0–4)
SODIUM SERPL-SCNC: 132 MMOL/L (ref 136–145)
SP GR UR STRIP: 1.01 (ref 1–1.03)
SQUAMOUS #/AREA URNS HPF: 1 /HPF
TROPONIN I SERPL DL<=0.01 NG/ML-MCNC: 0.03 NG/ML (ref 0–0.03)
URN SPEC COLLECT METH UR: ABNORMAL
UROBILINOGEN UR STRIP-ACNC: NEGATIVE EU/DL
WBC # BLD AUTO: 6.69 K/UL (ref 3.9–12.7)
WBC #/AREA URNS HPF: 1 /HPF (ref 0–5)

## 2024-08-31 PROCEDURE — 74177 CT ABD & PELVIS W/CONTRAST: CPT | Mod: 26,,, | Performed by: RADIOLOGY

## 2024-08-31 PROCEDURE — 85025 COMPLETE CBC W/AUTO DIFF WBC: CPT | Performed by: STUDENT IN AN ORGANIZED HEALTH CARE EDUCATION/TRAINING PROGRAM

## 2024-08-31 PROCEDURE — 63600175 PHARM REV CODE 636 W HCPCS: Performed by: STUDENT IN AN ORGANIZED HEALTH CARE EDUCATION/TRAINING PROGRAM

## 2024-08-31 PROCEDURE — 71045 X-RAY EXAM CHEST 1 VIEW: CPT | Mod: 26,,, | Performed by: RADIOLOGY

## 2024-08-31 PROCEDURE — 93010 ELECTROCARDIOGRAM REPORT: CPT | Mod: ,,, | Performed by: INTERNAL MEDICINE

## 2024-08-31 PROCEDURE — 36415 COLL VENOUS BLD VENIPUNCTURE: CPT | Performed by: STUDENT IN AN ORGANIZED HEALTH CARE EDUCATION/TRAINING PROGRAM

## 2024-08-31 PROCEDURE — 83605 ASSAY OF LACTIC ACID: CPT | Performed by: STUDENT IN AN ORGANIZED HEALTH CARE EDUCATION/TRAINING PROGRAM

## 2024-08-31 PROCEDURE — 80053 COMPREHEN METABOLIC PANEL: CPT | Performed by: STUDENT IN AN ORGANIZED HEALTH CARE EDUCATION/TRAINING PROGRAM

## 2024-08-31 PROCEDURE — 99285 EMERGENCY DEPT VISIT HI MDM: CPT | Mod: 25

## 2024-08-31 PROCEDURE — 83880 ASSAY OF NATRIURETIC PEPTIDE: CPT | Performed by: STUDENT IN AN ORGANIZED HEALTH CARE EDUCATION/TRAINING PROGRAM

## 2024-08-31 PROCEDURE — 81000 URINALYSIS NONAUTO W/SCOPE: CPT | Performed by: STUDENT IN AN ORGANIZED HEALTH CARE EDUCATION/TRAINING PROGRAM

## 2024-08-31 PROCEDURE — 93005 ELECTROCARDIOGRAM TRACING: CPT

## 2024-08-31 PROCEDURE — 96361 HYDRATE IV INFUSION ADD-ON: CPT

## 2024-08-31 PROCEDURE — 84484 ASSAY OF TROPONIN QUANT: CPT | Performed by: STUDENT IN AN ORGANIZED HEALTH CARE EDUCATION/TRAINING PROGRAM

## 2024-08-31 PROCEDURE — 25500020 PHARM REV CODE 255: Performed by: STUDENT IN AN ORGANIZED HEALTH CARE EDUCATION/TRAINING PROGRAM

## 2024-08-31 PROCEDURE — 94761 N-INVAS EAR/PLS OXIMETRY MLT: CPT

## 2024-08-31 PROCEDURE — 27000221 HC OXYGEN, UP TO 24 HOURS

## 2024-08-31 PROCEDURE — 96374 THER/PROPH/DIAG INJ IV PUSH: CPT | Mod: 59

## 2024-08-31 PROCEDURE — 25000003 PHARM REV CODE 250: Performed by: STUDENT IN AN ORGANIZED HEALTH CARE EDUCATION/TRAINING PROGRAM

## 2024-08-31 PROCEDURE — 96375 TX/PRO/DX INJ NEW DRUG ADDON: CPT

## 2024-08-31 PROCEDURE — 74177 CT ABD & PELVIS W/CONTRAST: CPT | Mod: TC

## 2024-08-31 PROCEDURE — 71045 X-RAY EXAM CHEST 1 VIEW: CPT | Mod: TC

## 2024-08-31 PROCEDURE — 83690 ASSAY OF LIPASE: CPT | Performed by: STUDENT IN AN ORGANIZED HEALTH CARE EDUCATION/TRAINING PROGRAM

## 2024-08-31 RX ORDER — KETOROLAC TROMETHAMINE 30 MG/ML
15 INJECTION, SOLUTION INTRAMUSCULAR; INTRAVENOUS
Status: COMPLETED | OUTPATIENT
Start: 2024-08-31 | End: 2024-08-31

## 2024-08-31 RX ORDER — ONDANSETRON HYDROCHLORIDE 2 MG/ML
4 INJECTION, SOLUTION INTRAVENOUS
Status: COMPLETED | OUTPATIENT
Start: 2024-08-31 | End: 2024-08-31

## 2024-08-31 RX ADMIN — KETOROLAC TROMETHAMINE 15 MG: 30 INJECTION INTRAMUSCULAR; INTRAVENOUS at 12:08

## 2024-08-31 RX ADMIN — ONDANSETRON 4 MG: 2 INJECTION INTRAMUSCULAR; INTRAVENOUS at 12:08

## 2024-08-31 RX ADMIN — SODIUM CHLORIDE 1000 ML: 9 INJECTION, SOLUTION INTRAVENOUS at 12:08

## 2024-08-31 RX ADMIN — IOHEXOL 75 ML: 350 INJECTION, SOLUTION INTRAVENOUS at 02:08

## 2024-08-31 NOTE — ED NOTES
Called report to ROMINA Severino at [facility]. She is calling [facility] for transport.

## 2024-08-31 NOTE — ED PROVIDER NOTES
Encounter Date: 8/31/2024       History     Chief Complaint   Patient presents with    Abdominal Pain     X 3 days . Patient presenting via EMS from Nursing home  .Reportedly given laxative x 2 days for c/o constipation. Had a BM ,now with abdominal pain .      The history is provided by the patient and the EMS personnel. No  was used.   Abdominal Pain  The current episode started several days ago. The problem has been resolved. The abdominal pain is generalized. The abdominal pain does not radiate. The abdominal pain is relieved by nothing. The other symptoms of the illness do not include fever, melena, shortness of breath, nausea, vomiting, diarrhea or dysuria.   The patient states that she believes she is currently not pregnant. The patient has had a change in bowel habit. Risk factors for an acute abdominal problem include being elderly. Symptoms associated with the illness do not include back pain.     Review of patient's allergies indicates:   Allergen Reactions    Prilosec [omeprazole]     Augmentin [amoxicillin-pot clavulanate] Nausea And Vomiting    Codeine Rash    Sulfa (sulfonamide antibiotics) Rash     Past Medical History:   Diagnosis Date    Acute kidney failure     Ankylosing spondylitis of unspecified sites in spine     Anxiety disorder, unspecified     Cerebral infarct     Chronic kidney disease, unspecified     Chronic pain after repair of joint     Cognitive communication deficit     COPD (chronic obstructive pulmonary disease)     Dementia     Encounter for current long-term use of anticoagulants     GERD (gastroesophageal reflux disease)     Malignant neoplasm determined by biopsy of breast     Metabolic encephalopathy     Mixed hyperlipidemia     Mood disturbance     Mumps without complication     Muscle wasting     Osteomyelitis     Paroxysmal atrial fibrillation     Presence of artificial hip     Radiculopathy     Spinal stenosis     TIA (transient ischemic attack)       Past Surgical History:   Procedure Laterality Date    BREAST SURGERY Right     partial    HEMIARTHROPLASTY, HIP, POSTERIOR APPROACH Left 06/04/2024    HYSTERECTOMY      JOINT REPLACEMENT      TONSILLECTOMY      VAGINECTOMY       No family history on file.  Social History     Tobacco Use    Smoking status: Never    Smokeless tobacco: Never     Review of Systems   Constitutional:  Negative for fever.   HENT:  Negative for sore throat.    Respiratory:  Negative for shortness of breath.    Cardiovascular:  Negative for chest pain and palpitations.   Gastrointestinal:  Positive for abdominal pain. Negative for diarrhea, melena, nausea and vomiting.   Genitourinary:  Negative for dysuria.   Musculoskeletal:  Negative for back pain.   Skin:  Negative for rash.   Neurological:  Negative for weakness.   Hematological:  Does not bruise/bleed easily.       Physical Exam     Initial Vitals [08/31/24 1153]   BP Pulse Resp Temp SpO2   (!) 201/88 66 18 97.7 °F (36.5 °C) 98 %      MAP       --         Physical Exam    Constitutional: She appears well-developed and well-nourished. She is not diaphoretic. No distress.   HENT:   Head: Normocephalic and atraumatic.   Right Ear: External ear normal.   Left Ear: External ear normal.   Eyes: EOM are normal. Pupils are equal, round, and reactive to light. No scleral icterus.   Neck: Neck supple.   Normal range of motion.  Cardiovascular:  Normal rate, regular rhythm and normal heart sounds.           Pulmonary/Chest: Breath sounds normal. No stridor. No respiratory distress. She has no wheezes. She has no rales.   Abdominal: Abdomen is soft. She exhibits no distension. There is no abdominal tenderness.   Musculoskeletal:         General: No tenderness or edema. Normal range of motion.      Cervical back: Normal range of motion and neck supple.     Neurological: She is alert and oriented to person, place, and time. She has normal strength. No cranial nerve deficit. GCS score is 15. GCS  eye subscore is 4. GCS verbal subscore is 5. GCS motor subscore is 6.   Skin: Skin is warm and dry. Capillary refill takes less than 2 seconds. No pallor.   Psychiatric: She has a normal mood and affect.         ED Course   Procedures  Labs Reviewed   CBC W/ AUTO DIFFERENTIAL - Abnormal       Result Value    WBC 6.69      RBC 2.53 (*)     Hemoglobin 8.0 (*)     Hematocrit 24.3 (*)     MCV 96      MCH 31.6 (*)     MCHC 32.9      RDW 21.2 (*)     Platelets 204      MPV 9.5      Immature Granulocytes 0.3      Gran # (ANC) 3.6      Immature Grans (Abs) 0.02      Lymph # 1.0      Mono # 0.6      Eos # 1.4 (*)     Baso # 0.03      nRBC 0      Gran % 53.5      Lymph % 14.9 (*)     Mono % 9.4      Eosinophil % 21.5 (*)     Basophil % 0.4      Differential Method Automated     COMPREHENSIVE METABOLIC PANEL - Abnormal    Sodium 132 (*)     Potassium 3.7      Chloride 102      CO2 23      Glucose 96      BUN 13      Creatinine 1.1      Calcium 8.4 (*)     Total Protein 8.6 (*)     Albumin 2.3 (*)     Total Bilirubin 0.8      Alkaline Phosphatase 154 (*)     AST 20      ALT <5 (*)     eGFR 49.9 (*)     Anion Gap 7 (*)    URINALYSIS, REFLEX TO URINE CULTURE - Abnormal    Specimen UA Urine, Unspecified      Color, UA Yellow      Appearance, UA Hazy (*)     pH, UA >8.0 (*)     Specific Gravity, UA 1.015      Protein, UA 2+ (*)     Glucose, UA Negative      Ketones, UA Trace (*)     Bilirubin (UA) 1+ (*)     Occult Blood UA 1+ (*)     Nitrite, UA Negative      Urobilinogen, UA Negative      Leukocytes, UA Negative      Narrative:     Preferred Collection Type->Urine, Clean Catch  Specimen Source->Urine   B-TYPE NATRIURETIC PEPTIDE - Abnormal     (*)    URINALYSIS MICROSCOPIC - Abnormal    RBC, UA 8 (*)     WBC, UA 1      Bacteria Moderate (*)     Squam Epithel, UA 1      Hyaline Casts, UA 0      Amorphous, UA Many (*)     Microscopic Comment SEE COMMENT      Narrative:     Preferred Collection Type->Urine, Clean  Catch  Specimen Source->Urine   LACTIC ACID, PLASMA    Lactate (Lactic Acid) 0.6     LIPASE    Lipase 7     TROPONIN I    Troponin I 0.025     B-TYPE NATRIURETIC PEPTIDE     EKG Readings: (Independently Interpreted)   Rhythm: Normal Sinus Rhythm. Ectopy: No Ectopy. Conduction: Normal. ST Segments: Normal ST Segments. T Waves: Normal. Clinical Impression: Normal Sinus Rhythm       Imaging Results              X-Ray Chest AP Portable (Final result)  Result time 08/31/24 14:32:47      Final result by Oleg Espinoza MD (08/31/24 14:32:47)                   Impression:      1. Enlarged cardiac silhouette, mild prominence of the central pulmonary vasculature, and accentuation of the perihilar and lower lobe interstitial lung markings.  Cardiac decompensation/heart failure is not excluded.  2. No radiographically evident pneumoperitoneum.      Electronically signed by: Pancho Espinoza MD  Date:    08/31/2024  Time:    14:32               Narrative:    EXAMINATION:  XR CHEST AP PORTABLE    CLINICAL HISTORY:  Epigastric pain    TECHNIQUE:  A single portable  AP chest radiograph was acquired.    COMPARISON:  Chest x-ray- 08/19/2024    FINDINGS:  There is a right upper extremity PICC line present with its tip noted in the expected position of the SVC.  The cardiac silhouette is mildly enlarged, similar to the prior study.  There is atherosclerotic calcification present within the thoracic aortic arch.  There is prominence of the central pulmonary vasculature and perihilar as well as lower lobe interstitial edema, slightly more pronounced than at the time of the prior study.  No new zone of airspace consolidation or new pulmonary mass.  No significant volume of pleural fluid or pneumothorax.  The bones are osteopenic.  There is degenerative change of the cervical and thoracic spine and right acromioclavicular joint.  No pneumoperitoneum appreciated on the provided radiographs.                                       CT  Abdomen Pelvis With IV Contrast NO Oral Contrast (Final result)  Result time 08/31/24 14:45:57      Final result by Oleg Espinoza MD (08/31/24 14:45:57)                   Impression:      1. Imaging findings which suggest possible cardiac decompensation/heart failure.  2. 9 mm left hepatic lobe hypodensity, stable when compared with 08/05/2024 and most likely representative of a proteinaceous cyst.  3. Cholelithiasis  4. Fluid/air-fluid levels within the nondistended colon.  Please correlate clinically as this finding can be seen in the setting of diarrhea.  No evidence of high-grade bowel obstruction or bowel inflammatory change.  No pneumoperitoneum.  5. Other findings as detailed above.      Electronically signed by: Pancho Espinoza MD  Date:    08/31/2024  Time:    14:45               Narrative:    EXAMINATION:  CT ABDOMEN PELVIS WITH IV CONTRAST    CLINICAL HISTORY:  Epigastric pain;    TECHNIQUE:  Low dose 3.75 mm contiguous axial images, sagittal and coronal reformations were obtained from the lung bases to the pubic symphysis following the IV administration of 75 mL of Omnipaque 350 .  Oral contrast was not given.    COMPARISON:  CT of the abdomen and pelvis without contrast-08/05/2024.    FINDINGS:  The heart is enlarged.  There is a small volume of pericardial fluid.  There are small volumes of bibasilar pleural fluid with associated atelectasis/consolidation.  There is a mosaic lung pattern at the lung bases suggesting possible pulmonary edema versus small airways disease.  These findings suggest possible cardiac decompensation/heart failure.  Please correlate clinically.    There is an unchanged 9 mm hypodensity present within the medial segment of the left hepatic lobe on series 2, image 27 which is too small to definitively characterize by CT, possibly a cyst.  The portal vein is patent.  There are layering calcified gallstones present in the gallbladder lumen.  No gallbladder inflammatory change.   No intra or extrahepatic biliary dilatation.  The pancreas, spleen, stomach, duodenal C-loop, pancreas, and adrenal glands show no significant abnormalities.  There is atherosclerotic calcification present within the abdominal aorta and common iliac arteries.    The appendix is unremarkable.  There is fluid present within the colon, and there are air-fluid levels present within the colon.  No colonic distension.  These findings could relate to diarrhea.  Please correlate clinically.  No bowel inflammatory change or high-grade bowel obstruction appreciated.  The small bowel is unremarkable.  No pneumoperitoneum or intra-abdominal abscess.  No peritoneal soft tissue mass or mesenteric adenopathy.    The kidneys show no evidence of stones, hydronephrosis, or solid mass.  There is a 14 mm exophytic cortical cyst arising from the lateral cortex in the interpolar region of the right kidney on series 2, image 29.  The ureters are normal in caliber and course without definite stones appreciated.  The urinary bladder is partially obscured by metal beam hardening artifact from the patient's left hip arthroplasty hardware.  Within the limitations of this examination, no bladder stones or bladder mass appreciated.  The uterus is surgically absent.  No solid or cystic adnexal mass appreciated.    There is degenerative change of the lower lumbar spine in the form of marginal osteophyte formation and disc space narrowing, most pronounced at L4-L5 and L5-S1.  There is prominent left neuroforaminal stenosis at L5-S1.  Please correlate clinically for symptoms referable to the left L5 nerve.  There is right hip joint space narrowing.                                       Medications   ketorolac injection 15 mg (15 mg Intravenous Given 8/31/24 1247)   ondansetron injection 4 mg (4 mg Intravenous Given 8/31/24 1247)   sodium chloride 0.9% bolus 1,000 mL 1,000 mL (0 mLs Intravenous Stopped 8/31/24 1533)   iohexoL (OMNIPAQUE 350)  injection 75 mL (75 mLs Intravenous Given 8/31/24 1401)     Medical Decision Making  84-year-old female presenting with diffuse abdominal pain.  She was brought in from a nursing facility by EMS.  Per their report she has had abdominal pain for the past 3 days.  She was given a laxative and had a large bowel movement with the nursing facility reported that she continued to have mild abdominal pain.  On arrival patient states that she is in no pain and denies any symptoms at this time including chest pain, fever, nausea, diarrhea, urinary symptoms, or abdominal pain.    On exam she is hypertensive on arrival but her blood pressure did improve without any significant intervention and she is currently asymptomatic.  She is alert and oriented to person, place, situation, year, and month.  Family is at bedside and reports that she is at baseline.  They also reports since her recent admission for a hematoma related to a hip replacement she has been bed-bound.    Labs are overall non actionable.  Her hemoglobin is near baseline.  CT showed evidence possible decompensated heart failure but on exam she she does not appear to be volume overloaded.  She is maintaining oxygen saturations on room air in the mid 90s and she has no respiratory symptoms.  Also noted to have a hepatic lobe cyst on CT.  Recommended outpatient follow up for both of these findings as she is currently asymptomatic and there is no indication for admission.  Given return precautions and discharged back to her nursing facility via EMS transport.  Discussed all findings with patient's family.    Amount and/or Complexity of Data Reviewed  Labs: ordered.  Radiology: ordered.    Risk  Prescription drug management.                                      Clinical Impression:  Final diagnoses:  [R10.13] Epigastric pain  [R10.9] Abdominal pain, unspecified abdominal location (Primary)          ED Disposition Condition    Discharge Stable          ED Prescriptions     None       Follow-up Information       Follow up With Specialties Details Why Contact Info    Dottie Zuñiga, NP-C Family Medicine Schedule an appointment as soon as possible for a visit in 1 week  [address] [facility] [address]  [phone]               Addy Vasques MD  09/01/24 1228

## 2024-08-31 NOTE — ED TRIAGE NOTES
Patient presents to ER via EMS from Nursing home. Patient relays upper abdominal pain x 3 days . Given laxatives at nursing home . She had a good BM. Abdominal pain persisting.

## 2024-09-03 LAB
OHS QRS DURATION: 82 MS
OHS QTC CALCULATION: 486 MS

## 2024-09-19 ENCOUNTER — LAB VISIT (OUTPATIENT)
Dept: LAB | Facility: HOSPITAL | Age: 84
End: 2024-09-19
Attending: NURSE PRACTITIONER
Payer: MEDICARE

## 2024-09-19 DIAGNOSIS — N28.9 ABNORMAL KIDNEY FUNCTION: Primary | ICD-10-CM

## 2024-09-19 LAB
ANION GAP SERPL CALC-SCNC: 9 MMOL/L (ref 8–16)
BUN SERPL-MCNC: 23 MG/DL (ref 8–23)
CALCIUM SERPL-MCNC: 9.3 MG/DL (ref 8.7–10.5)
CHLORIDE SERPL-SCNC: 105 MMOL/L (ref 95–110)
CO2 SERPL-SCNC: 23 MMOL/L (ref 23–29)
CREAT SERPL-MCNC: 1.9 MG/DL (ref 0.5–1.4)
EST. GFR  (NO RACE VARIABLE): 25.9 ML/MIN/1.73 M^2
GLUCOSE SERPL-MCNC: 89 MG/DL (ref 70–110)
POTASSIUM SERPL-SCNC: 3.9 MMOL/L (ref 3.5–5.1)
SODIUM SERPL-SCNC: 137 MMOL/L (ref 136–145)

## 2024-09-19 PROCEDURE — 80048 BASIC METABOLIC PNL TOTAL CA: CPT | Performed by: NURSE PRACTITIONER

## 2024-09-19 PROCEDURE — 36415 COLL VENOUS BLD VENIPUNCTURE: CPT | Performed by: NURSE PRACTITIONER

## 2024-09-20 ENCOUNTER — OUTSIDE PLACE OF SERVICE (OUTPATIENT)
Facility: HOSPITAL | Age: 84
End: 2024-09-20

## 2024-10-03 ENCOUNTER — LAB VISIT (OUTPATIENT)
Dept: LAB | Facility: HOSPITAL | Age: 84
End: 2024-10-03
Attending: NURSE PRACTITIONER
Payer: MEDICARE

## 2024-10-03 DIAGNOSIS — D64.9 ANEMIA: Primary | ICD-10-CM

## 2024-10-03 LAB
ALBUMIN SERPL BCP-MCNC: 2.2 G/DL (ref 3.5–5.2)
ALP SERPL-CCNC: 107 U/L (ref 55–135)
ALT SERPL W/O P-5'-P-CCNC: 10 U/L (ref 10–44)
ANION GAP SERPL CALC-SCNC: 9 MMOL/L (ref 8–16)
AST SERPL-CCNC: 19 U/L (ref 10–40)
BASOPHILS # BLD AUTO: 0.03 K/UL (ref 0–0.2)
BASOPHILS NFR BLD: 0.4 % (ref 0–1.9)
BILIRUB SERPL-MCNC: 0.4 MG/DL (ref 0.1–1)
BUN SERPL-MCNC: 20 MG/DL (ref 8–23)
CALCIUM SERPL-MCNC: 8 MG/DL (ref 8.7–10.5)
CHLORIDE SERPL-SCNC: 110 MMOL/L (ref 95–110)
CO2 SERPL-SCNC: 19 MMOL/L (ref 23–29)
CREAT SERPL-MCNC: 1.5 MG/DL (ref 0.5–1.4)
DIFFERENTIAL METHOD BLD: ABNORMAL
EOSINOPHIL # BLD AUTO: 1 K/UL (ref 0–0.5)
EOSINOPHIL NFR BLD: 14.2 % (ref 0–8)
ERYTHROCYTE [DISTWIDTH] IN BLOOD BY AUTOMATED COUNT: 18.2 % (ref 11.5–14.5)
EST. GFR  (NO RACE VARIABLE): 34.4 ML/MIN/1.73 M^2
GLUCOSE SERPL-MCNC: 78 MG/DL (ref 70–110)
HCT VFR BLD AUTO: 21.9 % (ref 37–48.5)
HGB BLD-MCNC: 7.1 G/DL (ref 12–16)
IMM GRANULOCYTES # BLD AUTO: 0.04 K/UL (ref 0–0.04)
IMM GRANULOCYTES NFR BLD AUTO: 0.6 % (ref 0–0.5)
LYMPHOCYTES # BLD AUTO: 1.2 K/UL (ref 1–4.8)
LYMPHOCYTES NFR BLD: 16.5 % (ref 18–48)
MCH RBC QN AUTO: 34.5 PG (ref 27–31)
MCHC RBC AUTO-ENTMCNC: 32.4 G/DL (ref 32–36)
MCV RBC AUTO: 106 FL (ref 82–98)
MONOCYTES # BLD AUTO: 0.8 K/UL (ref 0.3–1)
MONOCYTES NFR BLD: 11 % (ref 4–15)
NEUTROPHILS # BLD AUTO: 4 K/UL (ref 1.8–7.7)
NEUTROPHILS NFR BLD: 57.3 % (ref 38–73)
NRBC BLD-RTO: 0 /100 WBC
PLATELET # BLD AUTO: 259 K/UL (ref 150–450)
PMV BLD AUTO: 9.8 FL (ref 9.2–12.9)
POTASSIUM SERPL-SCNC: 4.2 MMOL/L (ref 3.5–5.1)
PROT SERPL-MCNC: 7.1 G/DL (ref 6–8.4)
RBC # BLD AUTO: 2.06 M/UL (ref 4–5.4)
SODIUM SERPL-SCNC: 138 MMOL/L (ref 136–145)
WBC # BLD AUTO: 6.97 K/UL (ref 3.9–12.7)

## 2024-10-03 PROCEDURE — 36415 COLL VENOUS BLD VENIPUNCTURE: CPT | Performed by: NURSE PRACTITIONER

## 2024-10-03 PROCEDURE — 80053 COMPREHEN METABOLIC PANEL: CPT | Performed by: NURSE PRACTITIONER

## 2024-10-03 PROCEDURE — 85025 COMPLETE CBC W/AUTO DIFF WBC: CPT | Performed by: NURSE PRACTITIONER

## 2024-10-08 ENCOUNTER — LAB VISIT (OUTPATIENT)
Dept: LAB | Facility: HOSPITAL | Age: 84
End: 2024-10-08
Attending: NURSE PRACTITIONER
Payer: MEDICARE

## 2024-10-08 DIAGNOSIS — N39.0 UTI (URINARY TRACT INFECTION): ICD-10-CM

## 2024-10-08 DIAGNOSIS — N18.9 CKD (CHRONIC KIDNEY DISEASE): ICD-10-CM

## 2024-10-08 DIAGNOSIS — D64.9 ANEMIA: Primary | ICD-10-CM

## 2024-10-08 LAB
ANION GAP SERPL CALC-SCNC: 7 MMOL/L (ref 8–16)
BASOPHILS # BLD AUTO: 0.04 K/UL (ref 0–0.2)
BASOPHILS NFR BLD: 0.7 % (ref 0–1.9)
BUN SERPL-MCNC: 14 MG/DL (ref 8–23)
CALCIUM SERPL-MCNC: 8.3 MG/DL (ref 8.7–10.5)
CHLORIDE SERPL-SCNC: 108 MMOL/L (ref 95–110)
CO2 SERPL-SCNC: 20 MMOL/L (ref 23–29)
CREAT SERPL-MCNC: 1.7 MG/DL (ref 0.5–1.4)
DIFFERENTIAL METHOD BLD: ABNORMAL
EOSINOPHIL # BLD AUTO: 0.9 K/UL (ref 0–0.5)
EOSINOPHIL NFR BLD: 15.2 % (ref 0–8)
ERYTHROCYTE [DISTWIDTH] IN BLOOD BY AUTOMATED COUNT: 17.4 % (ref 11.5–14.5)
EST. GFR  (NO RACE VARIABLE): 29.6 ML/MIN/1.73 M^2
GLUCOSE SERPL-MCNC: 74 MG/DL (ref 70–110)
HCT VFR BLD AUTO: 24.4 % (ref 37–48.5)
HGB BLD-MCNC: 7.7 G/DL (ref 12–16)
IMM GRANULOCYTES # BLD AUTO: 0.04 K/UL (ref 0–0.04)
IMM GRANULOCYTES NFR BLD AUTO: 0.7 % (ref 0–0.5)
INR PPP: 2.1 (ref 0.8–1.2)
LYMPHOCYTES # BLD AUTO: 1.1 K/UL (ref 1–4.8)
LYMPHOCYTES NFR BLD: 18.5 % (ref 18–48)
MCH RBC QN AUTO: 34.4 PG (ref 27–31)
MCHC RBC AUTO-ENTMCNC: 31.6 G/DL (ref 32–36)
MCV RBC AUTO: 109 FL (ref 82–98)
MONOCYTES # BLD AUTO: 0.7 K/UL (ref 0.3–1)
MONOCYTES NFR BLD: 11.9 % (ref 4–15)
NEUTROPHILS # BLD AUTO: 3.2 K/UL (ref 1.8–7.7)
NEUTROPHILS NFR BLD: 53 % (ref 38–73)
NRBC BLD-RTO: 0 /100 WBC
PLATELET # BLD AUTO: 319 K/UL (ref 150–450)
PMV BLD AUTO: 9.5 FL (ref 9.2–12.9)
POTASSIUM SERPL-SCNC: 4.1 MMOL/L (ref 3.5–5.1)
PROTHROMBIN TIME: 21.3 SEC (ref 9–12.5)
RBC # BLD AUTO: 2.24 M/UL (ref 4–5.4)
SODIUM SERPL-SCNC: 135 MMOL/L (ref 136–145)
WBC # BLD AUTO: 5.99 K/UL (ref 3.9–12.7)

## 2024-10-08 PROCEDURE — 85025 COMPLETE CBC W/AUTO DIFF WBC: CPT | Performed by: NURSE PRACTITIONER

## 2024-10-08 PROCEDURE — 85610 PROTHROMBIN TIME: CPT | Performed by: NURSE PRACTITIONER

## 2024-10-08 PROCEDURE — 80048 BASIC METABOLIC PNL TOTAL CA: CPT | Performed by: NURSE PRACTITIONER

## 2024-10-08 PROCEDURE — 36415 COLL VENOUS BLD VENIPUNCTURE: CPT | Performed by: NURSE PRACTITIONER

## 2024-10-11 ENCOUNTER — LAB VISIT (OUTPATIENT)
Dept: LAB | Facility: HOSPITAL | Age: 84
End: 2024-10-11
Attending: NURSE PRACTITIONER
Payer: MEDICARE

## 2024-10-11 DIAGNOSIS — K75.9 HEPATITIS: ICD-10-CM

## 2024-10-11 DIAGNOSIS — G93.41 METABOLIC ENCEPHALOPATHY: ICD-10-CM

## 2024-10-11 DIAGNOSIS — J44.1 OBSTRUCTIVE CHRONIC BRONCHITIS WITH EXACERBATION: Primary | ICD-10-CM

## 2024-10-11 LAB
ALBUMIN SERPL BCP-MCNC: 2.6 G/DL (ref 3.5–5.2)
ALP SERPL-CCNC: 133 U/L (ref 55–135)
ALT SERPL W/O P-5'-P-CCNC: 13 U/L (ref 10–44)
ANION GAP SERPL CALC-SCNC: 8 MMOL/L (ref 8–16)
AST SERPL-CCNC: 26 U/L (ref 10–40)
BASOPHILS # BLD AUTO: 0.03 K/UL (ref 0–0.2)
BASOPHILS NFR BLD: 0.5 % (ref 0–1.9)
BILIRUB SERPL-MCNC: 0.4 MG/DL (ref 0.1–1)
BUN SERPL-MCNC: 15 MG/DL (ref 8–23)
CALCIUM SERPL-MCNC: 8.2 MG/DL (ref 8.7–10.5)
CHLORIDE SERPL-SCNC: 108 MMOL/L (ref 95–110)
CO2 SERPL-SCNC: 21 MMOL/L (ref 23–29)
CREAT SERPL-MCNC: 1.6 MG/DL (ref 0.5–1.4)
DIFFERENTIAL METHOD BLD: ABNORMAL
EOSINOPHIL # BLD AUTO: 0.8 K/UL (ref 0–0.5)
EOSINOPHIL NFR BLD: 12.3 % (ref 0–8)
ERYTHROCYTE [DISTWIDTH] IN BLOOD BY AUTOMATED COUNT: 17.2 % (ref 11.5–14.5)
EST. GFR  (NO RACE VARIABLE): 31.8 ML/MIN/1.73 M^2
GLUCOSE SERPL-MCNC: 96 MG/DL (ref 70–110)
HAV IGM SERPL QL IA: NORMAL
HBV CORE IGM SERPL QL IA: NORMAL
HBV SURFACE AG SERPL QL IA: NORMAL
HCT VFR BLD AUTO: 24.6 % (ref 37–48.5)
HCV AB SERPL QL IA: NORMAL
HGB BLD-MCNC: 7.8 G/DL (ref 12–16)
IMM GRANULOCYTES # BLD AUTO: 0.03 K/UL (ref 0–0.04)
IMM GRANULOCYTES NFR BLD AUTO: 0.5 % (ref 0–0.5)
LYMPHOCYTES # BLD AUTO: 1 K/UL (ref 1–4.8)
LYMPHOCYTES NFR BLD: 16.2 % (ref 18–48)
MCH RBC QN AUTO: 34.5 PG (ref 27–31)
MCHC RBC AUTO-ENTMCNC: 31.7 G/DL (ref 32–36)
MCV RBC AUTO: 109 FL (ref 82–98)
MONOCYTES # BLD AUTO: 0.6 K/UL (ref 0.3–1)
MONOCYTES NFR BLD: 8.9 % (ref 4–15)
NEUTROPHILS # BLD AUTO: 4 K/UL (ref 1.8–7.7)
NEUTROPHILS NFR BLD: 61.6 % (ref 38–73)
NRBC BLD-RTO: 0 /100 WBC
PLATELET # BLD AUTO: 324 K/UL (ref 150–450)
PMV BLD AUTO: 9.8 FL (ref 9.2–12.9)
POTASSIUM SERPL-SCNC: 3.7 MMOL/L (ref 3.5–5.1)
PROT SERPL-MCNC: 9.2 G/DL (ref 6–8.4)
RBC # BLD AUTO: 2.26 M/UL (ref 4–5.4)
SODIUM SERPL-SCNC: 137 MMOL/L (ref 136–145)
WBC # BLD AUTO: 6.41 K/UL (ref 3.9–12.7)

## 2024-10-11 PROCEDURE — 36415 COLL VENOUS BLD VENIPUNCTURE: CPT | Performed by: NURSE PRACTITIONER

## 2024-10-11 PROCEDURE — 80074 ACUTE HEPATITIS PANEL: CPT | Performed by: NURSE PRACTITIONER

## 2024-10-11 PROCEDURE — 85025 COMPLETE CBC W/AUTO DIFF WBC: CPT | Performed by: NURSE PRACTITIONER

## 2024-10-11 PROCEDURE — 80053 COMPREHEN METABOLIC PANEL: CPT | Performed by: NURSE PRACTITIONER

## 2024-10-11 PROCEDURE — 80177 DRUG SCRN QUAN LEVETIRACETAM: CPT | Performed by: NURSE PRACTITIONER

## 2024-10-14 LAB — LEVETIRACETAM SERPL-MCNC: 45.3 UG/ML (ref 3–60)

## 2024-11-07 ENCOUNTER — DOCUMENT SCAN (OUTPATIENT)
Dept: HOME HEALTH SERVICES | Facility: HOSPITAL | Age: 84
End: 2024-11-07
Payer: MEDICARE

## 2024-11-26 ENCOUNTER — EXTERNAL HOME HEALTH (OUTPATIENT)
Dept: HOME HEALTH SERVICES | Facility: HOSPITAL | Age: 84
End: 2024-11-26
Payer: MEDICARE